# Patient Record
Sex: MALE | Race: WHITE | NOT HISPANIC OR LATINO | Employment: OTHER | URBAN - METROPOLITAN AREA
[De-identification: names, ages, dates, MRNs, and addresses within clinical notes are randomized per-mention and may not be internally consistent; named-entity substitution may affect disease eponyms.]

---

## 2017-01-04 ENCOUNTER — ALLSCRIPTS OFFICE VISIT (OUTPATIENT)
Dept: OTHER | Facility: OTHER | Age: 72
End: 2017-01-04

## 2017-01-04 DIAGNOSIS — M25.562 PAIN IN LEFT KNEE: ICD-10-CM

## 2017-01-04 DIAGNOSIS — R19.4 CHANGE IN BOWEL HABITS: ICD-10-CM

## 2017-01-04 DIAGNOSIS — R10.84 GENERALIZED ABDOMINAL PAIN: ICD-10-CM

## 2017-01-04 DIAGNOSIS — M19.90 OSTEOARTHRITIS: ICD-10-CM

## 2017-01-05 ENCOUNTER — APPOINTMENT (OUTPATIENT)
Dept: PHYSICAL THERAPY | Facility: CLINIC | Age: 72
End: 2017-01-05
Payer: COMMERCIAL

## 2017-01-05 PROCEDURE — 97161 PT EVAL LOW COMPLEX 20 MIN: CPT

## 2017-01-05 PROCEDURE — 97535 SELF CARE MNGMENT TRAINING: CPT

## 2017-01-05 PROCEDURE — 97140 MANUAL THERAPY 1/> REGIONS: CPT

## 2017-01-05 PROCEDURE — G8991 OTHER PT/OT GOAL STATUS: HCPCS

## 2017-01-05 PROCEDURE — G8990 OTHER PT/OT CURRENT STATUS: HCPCS

## 2017-01-09 ENCOUNTER — APPOINTMENT (OUTPATIENT)
Dept: PHYSICAL THERAPY | Facility: CLINIC | Age: 72
End: 2017-01-09
Payer: COMMERCIAL

## 2017-01-09 PROCEDURE — 97140 MANUAL THERAPY 1/> REGIONS: CPT

## 2017-01-09 PROCEDURE — 97110 THERAPEUTIC EXERCISES: CPT

## 2017-01-11 ENCOUNTER — APPOINTMENT (OUTPATIENT)
Dept: PHYSICAL THERAPY | Facility: CLINIC | Age: 72
End: 2017-01-11
Payer: COMMERCIAL

## 2017-01-11 PROCEDURE — 97110 THERAPEUTIC EXERCISES: CPT

## 2017-01-11 PROCEDURE — 97140 MANUAL THERAPY 1/> REGIONS: CPT

## 2017-01-12 ENCOUNTER — APPOINTMENT (OUTPATIENT)
Dept: PHYSICAL THERAPY | Facility: CLINIC | Age: 72
End: 2017-01-12
Payer: COMMERCIAL

## 2017-01-12 PROCEDURE — 97140 MANUAL THERAPY 1/> REGIONS: CPT

## 2017-01-12 PROCEDURE — 97110 THERAPEUTIC EXERCISES: CPT

## 2017-01-16 ENCOUNTER — APPOINTMENT (OUTPATIENT)
Dept: PHYSICAL THERAPY | Facility: CLINIC | Age: 72
End: 2017-01-16
Payer: COMMERCIAL

## 2017-01-16 PROCEDURE — 97110 THERAPEUTIC EXERCISES: CPT

## 2017-01-16 PROCEDURE — 97140 MANUAL THERAPY 1/> REGIONS: CPT

## 2017-01-18 ENCOUNTER — APPOINTMENT (OUTPATIENT)
Dept: PHYSICAL THERAPY | Facility: CLINIC | Age: 72
End: 2017-01-18
Payer: COMMERCIAL

## 2017-01-18 PROCEDURE — 97110 THERAPEUTIC EXERCISES: CPT

## 2017-01-18 PROCEDURE — 97140 MANUAL THERAPY 1/> REGIONS: CPT

## 2017-01-19 ENCOUNTER — APPOINTMENT (OUTPATIENT)
Dept: PHYSICAL THERAPY | Facility: CLINIC | Age: 72
End: 2017-01-19
Payer: COMMERCIAL

## 2017-01-19 PROCEDURE — 97140 MANUAL THERAPY 1/> REGIONS: CPT

## 2017-01-19 PROCEDURE — 97110 THERAPEUTIC EXERCISES: CPT

## 2017-01-23 ENCOUNTER — APPOINTMENT (OUTPATIENT)
Dept: PHYSICAL THERAPY | Facility: CLINIC | Age: 72
End: 2017-01-23
Payer: COMMERCIAL

## 2017-01-23 PROCEDURE — 97140 MANUAL THERAPY 1/> REGIONS: CPT

## 2017-01-23 PROCEDURE — 97110 THERAPEUTIC EXERCISES: CPT

## 2017-01-25 ENCOUNTER — APPOINTMENT (OUTPATIENT)
Dept: PHYSICAL THERAPY | Facility: CLINIC | Age: 72
End: 2017-01-25
Payer: COMMERCIAL

## 2017-01-25 PROCEDURE — 97110 THERAPEUTIC EXERCISES: CPT

## 2017-01-25 PROCEDURE — 97140 MANUAL THERAPY 1/> REGIONS: CPT

## 2017-01-26 ENCOUNTER — APPOINTMENT (OUTPATIENT)
Dept: PHYSICAL THERAPY | Facility: CLINIC | Age: 72
End: 2017-01-26
Payer: COMMERCIAL

## 2017-01-26 PROCEDURE — G8991 OTHER PT/OT GOAL STATUS: HCPCS

## 2017-01-26 PROCEDURE — 97110 THERAPEUTIC EXERCISES: CPT

## 2017-01-26 PROCEDURE — 97140 MANUAL THERAPY 1/> REGIONS: CPT

## 2017-01-26 PROCEDURE — G8990 OTHER PT/OT CURRENT STATUS: HCPCS

## 2017-01-26 PROCEDURE — 97164 PT RE-EVAL EST PLAN CARE: CPT

## 2017-01-27 ENCOUNTER — GENERIC CONVERSION - ENCOUNTER (OUTPATIENT)
Dept: OTHER | Facility: OTHER | Age: 72
End: 2017-01-27

## 2017-01-30 ENCOUNTER — APPOINTMENT (OUTPATIENT)
Dept: PHYSICAL THERAPY | Facility: CLINIC | Age: 72
End: 2017-01-30
Payer: COMMERCIAL

## 2017-01-30 PROCEDURE — 97140 MANUAL THERAPY 1/> REGIONS: CPT

## 2017-01-30 PROCEDURE — 97110 THERAPEUTIC EXERCISES: CPT

## 2017-02-01 ENCOUNTER — APPOINTMENT (OUTPATIENT)
Dept: PHYSICAL THERAPY | Facility: CLINIC | Age: 72
End: 2017-02-01
Payer: COMMERCIAL

## 2017-02-01 PROCEDURE — 97110 THERAPEUTIC EXERCISES: CPT

## 2017-02-01 PROCEDURE — 97140 MANUAL THERAPY 1/> REGIONS: CPT

## 2017-02-02 ENCOUNTER — APPOINTMENT (OUTPATIENT)
Dept: PHYSICAL THERAPY | Facility: CLINIC | Age: 72
End: 2017-02-02
Payer: COMMERCIAL

## 2017-02-02 PROCEDURE — 97140 MANUAL THERAPY 1/> REGIONS: CPT

## 2017-02-02 PROCEDURE — 97110 THERAPEUTIC EXERCISES: CPT

## 2017-02-06 ENCOUNTER — APPOINTMENT (OUTPATIENT)
Dept: PHYSICAL THERAPY | Facility: CLINIC | Age: 72
End: 2017-02-06
Payer: COMMERCIAL

## 2017-02-06 PROCEDURE — 97140 MANUAL THERAPY 1/> REGIONS: CPT

## 2017-02-06 PROCEDURE — 97110 THERAPEUTIC EXERCISES: CPT

## 2017-02-08 ENCOUNTER — APPOINTMENT (OUTPATIENT)
Dept: PHYSICAL THERAPY | Facility: CLINIC | Age: 72
End: 2017-02-08
Payer: COMMERCIAL

## 2017-02-08 PROCEDURE — 97140 MANUAL THERAPY 1/> REGIONS: CPT

## 2017-02-08 PROCEDURE — 97110 THERAPEUTIC EXERCISES: CPT

## 2017-02-09 ENCOUNTER — APPOINTMENT (OUTPATIENT)
Dept: PHYSICAL THERAPY | Facility: CLINIC | Age: 72
End: 2017-02-09
Payer: COMMERCIAL

## 2017-02-09 PROCEDURE — 97140 MANUAL THERAPY 1/> REGIONS: CPT

## 2017-02-09 PROCEDURE — 97010 HOT OR COLD PACKS THERAPY: CPT

## 2017-02-09 PROCEDURE — 97110 THERAPEUTIC EXERCISES: CPT

## 2017-02-13 ENCOUNTER — APPOINTMENT (OUTPATIENT)
Dept: PHYSICAL THERAPY | Facility: CLINIC | Age: 72
End: 2017-02-13
Payer: COMMERCIAL

## 2017-02-13 PROCEDURE — 97110 THERAPEUTIC EXERCISES: CPT

## 2017-02-13 PROCEDURE — 97140 MANUAL THERAPY 1/> REGIONS: CPT

## 2017-02-15 ENCOUNTER — APPOINTMENT (OUTPATIENT)
Dept: LAB | Facility: CLINIC | Age: 72
End: 2017-02-15
Payer: COMMERCIAL

## 2017-02-15 ENCOUNTER — APPOINTMENT (OUTPATIENT)
Dept: PHYSICAL THERAPY | Facility: CLINIC | Age: 72
End: 2017-02-15
Payer: COMMERCIAL

## 2017-02-15 ENCOUNTER — ALLSCRIPTS OFFICE VISIT (OUTPATIENT)
Dept: OTHER | Facility: OTHER | Age: 72
End: 2017-02-15

## 2017-02-15 ENCOUNTER — TRANSCRIBE ORDERS (OUTPATIENT)
Dept: LAB | Facility: CLINIC | Age: 72
End: 2017-02-15

## 2017-02-15 DIAGNOSIS — E11.9 DIABETES MELLITUS WITHOUT COMPLICATION (HCC): Primary | ICD-10-CM

## 2017-02-15 DIAGNOSIS — Z20.5 EXPOSURE TO HEPATITIS C: ICD-10-CM

## 2017-02-15 LAB
ALBUMIN SERPL BCP-MCNC: 3.7 G/DL (ref 3.5–5)
ALP SERPL-CCNC: 81 U/L (ref 46–116)
ALT SERPL W P-5'-P-CCNC: 21 U/L (ref 12–78)
ANION GAP SERPL CALCULATED.3IONS-SCNC: 10 MMOL/L (ref 4–13)
AST SERPL W P-5'-P-CCNC: 13 U/L (ref 5–45)
BILIRUB SERPL-MCNC: 1.14 MG/DL (ref 0.2–1)
BUN SERPL-MCNC: 14 MG/DL (ref 5–25)
CALCIUM SERPL-MCNC: 8.9 MG/DL (ref 8.3–10.1)
CHLORIDE SERPL-SCNC: 105 MMOL/L (ref 100–108)
CO2 SERPL-SCNC: 25 MMOL/L (ref 21–32)
CREAT SERPL-MCNC: 0.95 MG/DL (ref 0.6–1.3)
EST. AVERAGE GLUCOSE BLD GHB EST-MCNC: 140 MG/DL
GFR SERPL CREATININE-BSD FRML MDRD: >60 ML/MIN/1.73SQ M
GLUCOSE SERPL-MCNC: 130 MG/DL (ref 65–140)
HBA1C MFR BLD: 6.5 % (ref 4.2–6.3)
POTASSIUM SERPL-SCNC: 4 MMOL/L (ref 3.5–5.3)
PROT SERPL-MCNC: 7.1 G/DL (ref 6.4–8.2)
SODIUM SERPL-SCNC: 140 MMOL/L (ref 136–145)

## 2017-02-15 PROCEDURE — 36415 COLL VENOUS BLD VENIPUNCTURE: CPT

## 2017-02-15 PROCEDURE — 80053 COMPREHEN METABOLIC PANEL: CPT

## 2017-02-15 PROCEDURE — 97140 MANUAL THERAPY 1/> REGIONS: CPT

## 2017-02-15 PROCEDURE — 83036 HEMOGLOBIN GLYCOSYLATED A1C: CPT

## 2017-02-15 PROCEDURE — 97110 THERAPEUTIC EXERCISES: CPT

## 2017-02-15 PROCEDURE — 86803 HEPATITIS C AB TEST: CPT

## 2017-02-16 ENCOUNTER — APPOINTMENT (OUTPATIENT)
Dept: PHYSICAL THERAPY | Facility: CLINIC | Age: 72
End: 2017-02-16
Payer: COMMERCIAL

## 2017-02-16 ENCOUNTER — GENERIC CONVERSION - ENCOUNTER (OUTPATIENT)
Dept: OTHER | Facility: OTHER | Age: 72
End: 2017-02-16

## 2017-02-16 LAB — HCV AB SER QL: NORMAL

## 2017-02-16 PROCEDURE — 97110 THERAPEUTIC EXERCISES: CPT

## 2017-02-16 PROCEDURE — 97140 MANUAL THERAPY 1/> REGIONS: CPT

## 2017-02-17 ENCOUNTER — GENERIC CONVERSION - ENCOUNTER (OUTPATIENT)
Dept: OTHER | Facility: OTHER | Age: 72
End: 2017-02-17

## 2017-02-20 ENCOUNTER — APPOINTMENT (OUTPATIENT)
Dept: PHYSICAL THERAPY | Facility: CLINIC | Age: 72
End: 2017-02-20
Payer: COMMERCIAL

## 2017-02-20 PROCEDURE — 97110 THERAPEUTIC EXERCISES: CPT

## 2017-02-20 PROCEDURE — 97140 MANUAL THERAPY 1/> REGIONS: CPT

## 2017-02-22 ENCOUNTER — APPOINTMENT (OUTPATIENT)
Dept: PHYSICAL THERAPY | Facility: CLINIC | Age: 72
End: 2017-02-22
Payer: COMMERCIAL

## 2017-02-23 ENCOUNTER — APPOINTMENT (OUTPATIENT)
Dept: PHYSICAL THERAPY | Facility: CLINIC | Age: 72
End: 2017-02-23
Payer: COMMERCIAL

## 2017-02-23 PROCEDURE — 97140 MANUAL THERAPY 1/> REGIONS: CPT

## 2017-02-23 PROCEDURE — 97110 THERAPEUTIC EXERCISES: CPT

## 2017-05-30 ENCOUNTER — ALLSCRIPTS OFFICE VISIT (OUTPATIENT)
Dept: OTHER | Facility: OTHER | Age: 72
End: 2017-05-30

## 2017-05-30 DIAGNOSIS — E78.5 HYPERLIPIDEMIA: ICD-10-CM

## 2017-05-30 DIAGNOSIS — E11.9 TYPE 2 DIABETES MELLITUS WITHOUT COMPLICATIONS (HCC): ICD-10-CM

## 2017-08-28 ENCOUNTER — ALLSCRIPTS OFFICE VISIT (OUTPATIENT)
Dept: OTHER | Facility: OTHER | Age: 72
End: 2017-08-28

## 2017-08-28 DIAGNOSIS — E11.9 TYPE 2 DIABETES MELLITUS WITHOUT COMPLICATIONS (HCC): ICD-10-CM

## 2017-08-28 DIAGNOSIS — E78.5 HYPERLIPIDEMIA: ICD-10-CM

## 2017-08-29 ENCOUNTER — TRANSCRIBE ORDERS (OUTPATIENT)
Dept: ADMINISTRATIVE | Facility: HOSPITAL | Age: 72
End: 2017-08-29

## 2017-08-29 DIAGNOSIS — R14.0 ABDOMINAL BLOATING: Primary | ICD-10-CM

## 2017-09-11 ENCOUNTER — GENERIC CONVERSION - ENCOUNTER (OUTPATIENT)
Dept: OTHER | Facility: OTHER | Age: 72
End: 2017-09-11

## 2017-09-11 ENCOUNTER — HOSPITAL ENCOUNTER (OUTPATIENT)
Dept: NUCLEAR MEDICINE | Facility: HOSPITAL | Age: 72
Discharge: HOME | End: 2017-09-11
Attending: INTERNAL MEDICINE
Payer: COMMERCIAL

## 2017-09-11 DIAGNOSIS — R14.0 ABDOMINAL BLOATING: ICD-10-CM

## 2017-09-11 PROCEDURE — A9541 TC99M SULFUR COLLOID: HCPCS

## 2017-09-11 PROCEDURE — 78264 GASTRIC EMPTYING IMG STUDY: CPT

## 2017-09-14 ENCOUNTER — GENERIC CONVERSION - ENCOUNTER (OUTPATIENT)
Dept: OTHER | Facility: OTHER | Age: 72
End: 2017-09-14

## 2017-10-24 NOTE — PROGRESS NOTES
Assessment  1  Diabetes with neurologic complications (682 39) (O33 93)   2  DMII (diabetes mellitus, type 2) (250 00) (E11 9)   3  Hyperlipidemia (272 4) (E78 5)   · LDL up slightly given DM, but refusing and treatment  4  Hypertension (401 9) (I10)   5  Lymphedema (457 1) (I89 0)   6  Osteoarthritis (715 90) (M19 90)   7  Vitamin B12 deficiency (266 2) (E53 8)   8  Diabetic nephropathy (250 40,583 81) (E11 21)   9  Abdominal bloating (787 3) (R14 0)    Plan  Abdominal bloating    · NM GASTRIC EMPTYING; Status:Need Information - Financial Authorization; Requested  for:[id number];     Discussion/Summary  Discussion Summary:   Doing well except for the abdominal issues  ?GERD, gastroparesis, sprue, lactose intolerence, etc  Will check gastric emptying study  Declines PPI trial  Doubt pancreatitis  Continue current treatment  Labs up to date  RTC one week after the test    Medication SE Review and Pt Understands Tx: Possible side effects of new medications were reviewed with the patient/guardian today  The treatment plan was reviewed with the patient/guardian  The patient/guardian understands and agrees with the treatment plan      Chief Complaint  Chief Complaint Free Text Note Form: RTN - HTN, DM  Kalyan Villanueva Pt c/o nausea, abdominal bloating, discomfort, excessive flatus x 1 month   becoming more frequent  History of Present Illness  HPI: WM RTC for f/u dm, htn, etc  Doing well, notes several months of GI issues  Feels bloated and nauseated a lot  Increase belching and flatulence  No n/v  no changes in stool, and no pain  No heartburn  Just had labs done and HgA1c was 6 7  Hyperlipidemia (Follow-Up): The patient states his hyperlipidemia has been under good control since the last visit  Comorbid Illnesses: diabetes mellitus-- and-- hypertension  He has no significant interval events  Symptoms: The patient is currently asymptomatic  Associated symptoms include no focal neurologic deficits-- and-- no memory loss  Medications: The patient is not currently on any medications for his hyperlipidemia  The patient is doing well with his hyperlipidemia goals  Hypertension (Follow-Up): The patient presents for follow-up of essential hypertension  The patient states he has been doing well with his blood pressure control since the last visit  Comorbid Illnesses: nephropathy  He has no significant interval events  Symptoms: The patient is currently asymptomatic  Associated symptoms include no headache  Home monitoring: The patient checks his blood pressure sporadically  Blood pressure control has been good  Medications: the patient is adherent with his medication regimen  -- He denies medication side effects  Medication(s): an ACE inhibitor  Disease Management: the patient is doing well with his blood pressure goals  Diabetes Type II (Follow-Up): The patient states he has been doing well with his Type II Diabetes control since the last visit  Comorbid Illnesses: hypertension-- and-- hyperlipidemia  Complications: peripheral neuropathy-- and-- nephropathy, but-- no gastroparesis,-- no coronary artery disease-- and-- no sexual dysfunction  He has no significant interval events  Symptoms: stable numbness of the feet,-- denies a foot ulcer-- and-- denies foot pain  Home monitoring: The patient checks his blood sugars regularly  -- Glycemic control has been good  -- the patient reports no symptomatic hypoglycemic episodes  Medications: the patient is adherent with his medication regimen  -- He denies medication side effects  Medication(s): Metformin HCl,-- Sulfonylurea,-- ACE inhibitors-- and-- Aspirin  The patient is doing well with his diabetes goals  Osteoarthritis (Follow-Up): The patient states his osteoarthritis has been stable since the last visit  He has no comorbid illnesses  Osteoarthritis complications include knee arthroplasty  The patient's osteoarthritis has not resulted in physical disability   He has no significant interval events  Symptoms: improved knee pain  Associated symptoms include joint stiffness, but-- no localized joint swelling  Activities: no limitations  Medications: the patient is adherent with his medication regimen  -- He denies medication side effects  Medication(s): acetaminophen-- and-- a non-steroidal antiinflammatory agent  Disease Management: the patient is doing well with his osteoarthritis goals  Review of Systems  Complete-Male:   Constitutional: no fever,-- not feeling poorly,-- no chills-- and-- not feeling tired  Cardiovascular: no chest pain,-- no intermittent leg claudication,-- no palpitations-- and-- no extremity edema  Respiratory: no shortness of breath,-- no cough,-- no orthopnea,-- no wheezing,-- no shortness of breath during exertion-- and-- no PND  Gastrointestinal: abdominal pain, but-- as noted in HPI,-- no nausea,-- no vomiting,-- no constipation,-- no diarrhea-- and-- no blood in stools  Genitourinary: No complaints of dysuria, no incontinence, no hesitancy, no nocturia, no genital lesion, no testicular pain  Musculoskeletal: No complaints of arthralgia, no myalgias, no joint swelling or stiffness, no limb pain or swelling  Integumentary: No complaints of skin rash or skin lesions, no itching, no skin wound, no dry skin  Neurological: no headache,-- no confusion-- and-- no convulsions  Psychiatric: no anxiety-- and-- no depression  Endocrine: No complaints of proptosis, no hot flashes, no muscle weakness, no erectile dysfunction, no deepening of the voice, no feelings of weakness  Hematologic/Lymphatic: No complaints of swollen glands, no swollen glands in the neck, does not bleed easily, no easy bruising  Active Problems  1  Benign prostate hyperplasia (600 00) (N40 0)   2  Chronic allergic conjunctivitis (372 14) (H10 45)   3  Colon, diverticulosis (562 10) (K57 30)   4  Diabetes with neurologic complications (836 91) (L95 84)   5  Diabetic nephropathy (250 40,583 81) (E11 21)   6  DMII (diabetes mellitus, type 2) (250 00) (E11 9)   7  Encounter for diabetic foot exam (250 00) (E11 9)   8  Erectile dysfunction of non-organic origin (302 72) (F52 21)   9  Hyperlipidemia (272 4) (E78 5)   10  Hypertension (401 9) (I10)   11  Lymphedema (457 1) (I89 0)   12  Obesity (278 00) (E66 9)   13  Osteoarthritis (715 90) (M19 90)   14  Polyp of sigmoid colon (211 3) (D12 5)   15  Screening for neurological condition (V80 09) (Z13 89)   16  Screening for prostate cancer (V76 44) (Z12 5)   17  Sleep disturbances (780 50) (G47 9)   18  Vitamin B12 deficiency (266 2) (E53 8)   19  Wellness examination (V70 0) (Z00 00)    Past Medical History  1  History of Abdominal pain, acute, generalized (789 07,338 19) (R10 84)   2  History of Acute pain of left knee (719 46) (M25 562)   3  History of Acute serous otitis media of right ear (381 01) (H65 01)   4  History of Change in stool habits (787 99) (R19 4)   5  History of Eustachian tube dysfunction (381 81) (H69 80)   6  History of Exposure to hepatitis C (V01 79) (Z20 5)   7  History of fatigue (V13 89) (Z87 898)   8  History of Otitis externa (380 10) (H60 90)   9  History of Pre-operative examination for internal medicine (V72 83) (Z01 818)   10  History of Scrotal bleeding (608 83) (N50 1)  Active Problems And Past Medical History Reviewed: The active problems and past medical history were reviewed and updated today  Surgical History  1  History of Cholecystectomy   2  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   3  History of Total Knee Replacement Left   4  History of Total Knee Replacement Right   5  History of Umbilical Hernia Repair  Surgical History Reviewed: The surgical history was reviewed and updated today  Family History  Mother    1  No pertinent family history  Family History Reviewed: The family history was reviewed and updated today         Social History · Former smoker (U33 97) (X04 620)  Social History Reviewed: The social history was reviewed and updated today  The social history was reviewed and is unchanged  Current Meds   1  FreeStyle Test In Vitro Strip; #200 USE AS DIRECTED; Therapy: 21Apr2011 to  Requested for: 01Vfc7068 Recorded   2  Glimepiride 4 MG Oral Tablet; TAKE ONE TABLET BY MOUTH TWICE DAILY; Therapy: 07UTY1527 to [phone])  Requested for: 88FSK7592; Last Rx:01Feb2017;   Status: ACTIVE - Transmit to Pharmacy - Awaiting Verification Ordered   3  Lisinopril 10 MG Oral Tablet; TAKE ONE TABLET BY MOUTH EVERY DAY; Therapy: 76FFG4681 to (Mignon Mcmahon)  Requested for: 25MCY5960; Last Rx:34Tub4821   Ordered   4  MetFORMIN HCl - 1000 MG Oral Tablet; TAKE 1 TABLET Daily take 1 tab in am 1/2 tab at lunch 1 tab   in evening; Therapy: 81SIJ2505 to (Evaluate:27Jan2018)  Requested for: 60HSD4144; Last Rx:26Wqb6307   Ordered   5  Patanol 0 1 % Ophthalmic Solution; INSTILL 1 DROP INTO AFFECTED EYE(S) TWICE DAILY AS   DIRECTED; Therapy: 01TIF1082 to (Evaluate:08Jan2016)  Requested for: 23SIV2697; Last Rx:13Jan2015   Ordered   6  Tamsulosin HCl - 0 4 MG Oral Capsule; TAKE 1 CAPSULE Daily; Therapy: 13PBG5415 to (Evaluate:58Fqb3932)  Requested for: 04Cho6609; Last Rx:45Hze5337   Ordered  Medication List Reviewed: The medication list was reviewed and updated today  Allergies  1  Sulfa Drugs    Vitals  Vital Signs    Recorded: 98Jgj5776 02:27PM   Temperature 98 1 F   Heart Rate 72   Respiration 18   Systolic 949   Diastolic 68   Height 6 ft 4 in   Weight 256 lb    BMI Calculated 31 16   BSA Calculated 2 46     Physical Exam    Constitutional   General appearance: No acute distress, well appearing and well nourished  Eyes   Conjunctiva and lids: No swelling, erythema, or discharge  Pupils and irises: Equal, round and reactive to light      Ears, Nose, Mouth, and Throat   Oropharynx: Normal with no erythema, edema, exudate or lesions  Pulmonary   Respiratory effort: No increased work of breathing or signs of respiratory distress  Auscultation of lungs: Clear to auscultation, equal breath sounds bilaterally, no wheezes, no rales, no rhonci  Cardiovascular   Auscultation of heart: Normal rate and rhythm, normal S1 and S2, without murmurs  Examination of extremities for edema and/or varicosities: Normal     Carotid pulses: Normal     Abdomen   Abdomen: Non-tender, no masses  Liver and spleen: No hepatomegaly or splenomegaly  Musculoskeletal   Gait and station: Normal     Psychiatric   Orientation to person, place and time: Normal     Mood and affect: Normal          Health Management  Benign prostate hyperplasia   (1) PSA (SCREEN) (Dx V76 44 Screen for Prostate Cancer); every 1 year; Last 25Oct2016; Next  Due: 67ZXS6545; Near Due  Diabetes mellitus with polyneuropathy   *VB - Foot Exam; every 1 year; Last 48YMZ1673; Next Due: 91KSM5387; Active  Diabetes with neurologic complications   *VB - Foot Exam; every 1 year; Last 47TSW7371; Next Due: 53OAO8746; Active  Diabetic nephropathy   (1) HEMOGLOBIN A1C; every 6 months; Last 08KAE6730; Next Due: 85Xlx7781; Overdue  (1) MICROALBUMIN CREATININE RATIO, RANDOM URINE; every 1 year; Last 25Oct2016; Next Due:  45SNB3636; Near Due  DMII (diabetes mellitus, type 2)   (1) HEMOGLOBIN A1C; every 6 months; Last 34TYY7268; Next Due: 15Gfx1212; Overdue  (1) MICROALBUMIN CREATININE RATIO, RANDOM URINE; every 1 year; Last 25Oct2016; Next Due:  07XVF6428; Near Due  *VB - Foot Exam; every 1 year; Last 07MFP8865; Next Due: 09HBS7281; Active  History of Screening for diabetic retinopathy   *VB - Eye Exam; every 1 year; Next Due: 78HFB2418; Overdue  Health Maintenance   Medicare Annual Wellness Visit; every 1 year; Next Due: 59DEG9269;  Overdue    Signatures   Electronically signed by : CODIE Bennett ; Aug 28 2017  2:47PM EST                       (Author)

## 2017-10-30 ENCOUNTER — TRANSCRIBE ORDERS (OUTPATIENT)
Dept: LAB | Facility: CLINIC | Age: 72
End: 2017-10-30

## 2017-10-30 ENCOUNTER — APPOINTMENT (OUTPATIENT)
Dept: LAB | Facility: CLINIC | Age: 72
End: 2017-10-30
Payer: COMMERCIAL

## 2017-10-30 DIAGNOSIS — E87.1 HYPOSMOLALITY SYNDROME: Primary | ICD-10-CM

## 2017-10-30 DIAGNOSIS — E87.1 HYPOSMOLALITY SYNDROME: ICD-10-CM

## 2017-10-30 DIAGNOSIS — E11.9 DIABETES MELLITUS WITHOUT COMPLICATION (HCC): ICD-10-CM

## 2017-10-30 LAB
ANION GAP SERPL CALCULATED.3IONS-SCNC: 5 MMOL/L (ref 4–13)
BUN SERPL-MCNC: 14 MG/DL (ref 5–25)
CALCIUM SERPL-MCNC: 9 MG/DL (ref 8.3–10.1)
CHLORIDE SERPL-SCNC: 104 MMOL/L (ref 100–108)
CO2 SERPL-SCNC: 29 MMOL/L (ref 21–32)
CREAT SERPL-MCNC: 1 MG/DL (ref 0.6–1.3)
GFR SERPL CREATININE-BSD FRML MDRD: 76 ML/MIN/1.73SQ M
GLUCOSE P FAST SERPL-MCNC: 155 MG/DL (ref 65–99)
POTASSIUM SERPL-SCNC: 4.4 MMOL/L (ref 3.5–5.3)
SODIUM SERPL-SCNC: 138 MMOL/L (ref 136–145)

## 2017-10-30 PROCEDURE — 80048 BASIC METABOLIC PNL TOTAL CA: CPT

## 2017-10-30 PROCEDURE — 36415 COLL VENOUS BLD VENIPUNCTURE: CPT

## 2017-11-20 ENCOUNTER — TRANSCRIBE ORDERS (OUTPATIENT)
Dept: LAB | Facility: CLINIC | Age: 72
End: 2017-11-20

## 2017-11-20 ENCOUNTER — APPOINTMENT (OUTPATIENT)
Dept: LAB | Facility: CLINIC | Age: 72
End: 2017-11-20
Payer: COMMERCIAL

## 2017-11-20 DIAGNOSIS — E11.9 DIABETES MELLITUS WITHOUT COMPLICATION (HCC): Primary | ICD-10-CM

## 2017-11-20 DIAGNOSIS — E78.5 HYPERLIPIDEMIA, UNSPECIFIED HYPERLIPIDEMIA TYPE: ICD-10-CM

## 2017-11-20 DIAGNOSIS — E11.9 DIABETES MELLITUS WITHOUT COMPLICATION (HCC): ICD-10-CM

## 2017-11-20 LAB
ALBUMIN SERPL BCP-MCNC: 3.6 G/DL (ref 3.5–5)
ALP SERPL-CCNC: 87 U/L (ref 46–116)
ALT SERPL W P-5'-P-CCNC: 22 U/L (ref 12–78)
ANION GAP SERPL CALCULATED.3IONS-SCNC: 4 MMOL/L (ref 4–13)
AST SERPL W P-5'-P-CCNC: 8 U/L (ref 5–45)
BILIRUB SERPL-MCNC: 1.29 MG/DL (ref 0.2–1)
BUN SERPL-MCNC: 13 MG/DL (ref 5–25)
CALCIUM SERPL-MCNC: 8.8 MG/DL (ref 8.3–10.1)
CHLORIDE SERPL-SCNC: 105 MMOL/L (ref 100–108)
CO2 SERPL-SCNC: 30 MMOL/L (ref 21–32)
CREAT SERPL-MCNC: 0.93 MG/DL (ref 0.6–1.3)
EST. AVERAGE GLUCOSE BLD GHB EST-MCNC: 146 MG/DL
GFR SERPL CREATININE-BSD FRML MDRD: 83 ML/MIN/1.73SQ M
GLUCOSE P FAST SERPL-MCNC: 158 MG/DL (ref 65–99)
HBA1C MFR BLD: 6.7 % (ref 4.2–6.3)
LDLC SERPL DIRECT ASSAY-MCNC: 98 MG/DL (ref 0–100)
POTASSIUM SERPL-SCNC: 4.4 MMOL/L (ref 3.5–5.3)
PROT SERPL-MCNC: 7.1 G/DL (ref 6.4–8.2)
SODIUM SERPL-SCNC: 139 MMOL/L (ref 136–145)
TRIGL SERPL-MCNC: 127 MG/DL

## 2017-11-20 PROCEDURE — 83721 ASSAY OF BLOOD LIPOPROTEIN: CPT

## 2017-11-20 PROCEDURE — 80053 COMPREHEN METABOLIC PANEL: CPT

## 2017-11-20 PROCEDURE — 84478 ASSAY OF TRIGLYCERIDES: CPT

## 2017-11-20 PROCEDURE — 36415 COLL VENOUS BLD VENIPUNCTURE: CPT

## 2017-11-20 PROCEDURE — 83036 HEMOGLOBIN GLYCOSYLATED A1C: CPT

## 2017-11-21 ENCOUNTER — GENERIC CONVERSION - ENCOUNTER (OUTPATIENT)
Dept: OTHER | Facility: OTHER | Age: 72
End: 2017-11-21

## 2017-12-04 ENCOUNTER — APPOINTMENT (OUTPATIENT)
Dept: LAB | Facility: CLINIC | Age: 72
End: 2017-12-04
Payer: COMMERCIAL

## 2017-12-04 ENCOUNTER — TRANSCRIBE ORDERS (OUTPATIENT)
Dept: LAB | Facility: CLINIC | Age: 72
End: 2017-12-04

## 2017-12-04 ENCOUNTER — ALLSCRIPTS OFFICE VISIT (OUTPATIENT)
Dept: OTHER | Facility: OTHER | Age: 72
End: 2017-12-04

## 2017-12-04 DIAGNOSIS — Z12.5 ENCOUNTER FOR SCREENING FOR MALIGNANT NEOPLASM OF PROSTATE: ICD-10-CM

## 2017-12-04 DIAGNOSIS — R14.2 ERUCTATION: ICD-10-CM

## 2017-12-04 DIAGNOSIS — R14.0 ABDOMINAL DISTENSION (GASEOUS): ICD-10-CM

## 2017-12-04 DIAGNOSIS — R14.3 FLATULENCE: ICD-10-CM

## 2017-12-04 LAB — PSA SERPL-MCNC: 0.6 NG/ML (ref 0–4)

## 2017-12-04 PROCEDURE — 36415 COLL VENOUS BLD VENIPUNCTURE: CPT

## 2017-12-04 PROCEDURE — G0103 PSA SCREENING: HCPCS

## 2017-12-04 PROCEDURE — 82784 ASSAY IGA/IGD/IGG/IGM EACH: CPT

## 2017-12-04 PROCEDURE — 83516 IMMUNOASSAY NONANTIBODY: CPT

## 2017-12-04 PROCEDURE — 86255 FLUORESCENT ANTIBODY SCREEN: CPT

## 2017-12-05 ENCOUNTER — GENERIC CONVERSION - ENCOUNTER (OUTPATIENT)
Dept: OTHER | Facility: OTHER | Age: 72
End: 2017-12-05

## 2017-12-06 ENCOUNTER — GENERIC CONVERSION - ENCOUNTER (OUTPATIENT)
Dept: OTHER | Facility: OTHER | Age: 72
End: 2017-12-06

## 2017-12-06 LAB
ENDOMYSIUM IGA SER QL: NEGATIVE
GLIADIN PEPTIDE IGA SER-ACNC: 4 UNITS (ref 0–19)
GLIADIN PEPTIDE IGG SER-ACNC: 3 UNITS (ref 0–19)
IGA SERPL-MCNC: 256 MG/DL (ref 61–437)
TTG IGA SER-ACNC: <2 U/ML (ref 0–3)
TTG IGG SER-ACNC: <2 U/ML (ref 0–5)

## 2018-01-09 NOTE — PROGRESS NOTES
History of Present Illness  Care Coordination Encounter Information:   Type of Encounter: Telephonic   Contact: Initial Contact    Spoke to Spouse   Venita  Care Coordination SL Nurse [facility]:   The reason for call is to discuss outreach for follow up/needed services  Patient contacted for hospital follow up, on insurance discharge list as discharged to home 3/25/16 from acute rehab post bilateral knee replacements  Spoke with patient's wife, Deshaun Arzola  She reports patient is doing well, receiving home physical therapy for one more week and then transitioning to outpatient therapy starting 4/18/16  Will be receiving lymphedema therapy as an outpatient as well due to flare up of bilateral lower extremity edema following surgery  Had [facility] coming to home for assessments and incision checks, was discharged from their service 4/6/16  Reports patient followed up with Dr Baljinder Joe (orthopedic surgeon) yesterday, 4/7/16, and has another follow-up scheduled for 5/5/16  Reports patient is experiencing only moderate pain and has been using Tramadol prior to physical therapy, as patient had severe side effects from opioid pain medications  States Dr Baljinder Joe advised using Tylenol in addition to Tramadol to reduce muscle aches  Reports that since patient is now homebound, his blood sugars have stabilized tremendously because he is only eating home cooked meals  Also states patient is now checking blood sugars at least 3 times a day, with readings no higher than 150  I encouraged spouse that it sounds as if she is doing an excellent job caring for her  and that he is doing so well after major surgery because of her help  States patient has also been following with Dr Mark Suarez (nephrology) as his sodium levels were low in the hospital and remained low on discharge  Blood work just done this week, to have blood work done again in May before next follow up with Dr Mark Suarez        Spouse reports patient's main complaint currently is insomnia  Currently sleeping in recliner due to discomfort when lying flat  Task sent to Dr Emmy Jacome to make aware, will contact patient with update later today  Did advise spouse that they might try over the counter sleep aids before utilizing prescription strength medication  Reviewed current choices such as Unisom, Z-Quil, Benadryl, as they all have the same active ingredient  Spouse states she is unsure if patient has ever taken Benadryl and so is unsure if he has any adverse reaction to it  Stated she would not purchase any over the counter medications until an update is received from Dr Emmy Jacome regarding prescription  Spouse reports no further questions or concerns at conclusion of call  Update 4:09pm: Response not yet received from Dr Emmy Jacome regarding prescribing sleep aid  Left message on home phone  Advised use of over the counter sleep aid this weekend if needed and I will contact patient on Monday with update from the provider  Update: Provider sent script to pharmacy on file 4/8/16, patient made aware by practice on 4/11/16  Active Problems    1  Benign prostate hyperplasia (600 00) (N40 0)   2  Chronic allergic conjunctivitis (372 14) (H10 45)   3  Colon, diverticulosis (562 10) (K57 30)   4  Diabetes mellitus with polyneuropathy (250 60,357 2) (E11 42)   5  Diabetes with neurologic complications (726 39) (D99 71)   6  Diabetic nephropathy (250 40,583 81) (E11 21)   7  DMII (diabetes mellitus, type 2) (250 00) (E11 9)   8  Erectile dysfunction of non-organic origin (302 72) (F52 21)   9  Hyperlipidemia (272 4) (E78 5)   10  Hypertension (401 9) (I10)   11  Lymphedema (457 1) (I89 0)   12  Obesity (278 00) (E66 9)   13  Osteoarthritis (715 90) (M19 90)   14  Polyp of sigmoid colon (211 3) (D12 5)   15  Pre-operative examination for internal medicine (V72 83) (Z01 818)   16  Screening for diabetic retinopathy (V80 2) (Z13 5)   17   Screening for prostate cancer (V76 44) (Z12 5)   18  Vitamin B12 deficiency (266 2) (E53 8)    Past Medical History    1  History of Acute serous otitis media of right ear (381 01) (H65 01)   2  History of Eustachian tube dysfunction (381 81) (H69 80)   3  History of fatigue (V13 89) (Z87 898)   4  History of Otitis externa (380 10) (H60 90)   5  History of Scrotal bleeding (608 83) (N50 1)    Surgical History    1  History of Cholecystectomy   2  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   3  History of Umbilical Hernia Repair    Family History    1  No pertinent family history    Social History    · Former smoker (O99 04) (W85 885)    Current Meds    1  Tamsulosin HCl - 0 4 MG Oral Capsule; TAKE 1 CAPSULE Daily; Therapy: 97HOB0650 to (Evaluate:21Apr2016)  Requested for: 27Apr2015; Last   Rx:27Apr2015 Ordered    2  Patanol 0 1 % Ophthalmic Solution (Olopatadine HCl); INSTILL 1 DROP INTO   AFFECTED EYE(S) TWICE DAILY AS DIRECTED; Therapy: 51QDA3589 to (Evaluate:08Jan2016)  Requested for: 41TIM0163; Last   Rx:13Jan2015 Ordered    3  Glimepiride 4 MG Oral Tablet; TAKE ONE TABLET BY MOUTH TWICE DAILY; Therapy: 15JCQ6867 to (Evaluate:95Ihe7742)  Requested for: 37Ttk5036; Last   Rx:57Cuk5524 Ordered   4  MetFORMIN HCl - 1000 MG Oral Tablet; TAKE 1 TABLET Daily take 1 tab in am 1/2 tab at   lunch 1 tab in evening; Therapy: 98SMW1370 to (Geneva Robbins)  Requested for: 93Ekp0542; Last   Rx:23Pvk5218 Ordered    5  Lisinopril 10 MG Oral Tablet; TAKE ONE TABLET BY MOUTH EVERY DAY; Therapy: 24LJL1793 to (Carrington Arce)  Requested for: 07Apr2014; Last   Rx:07Apr2014 Ordered    6  FreeStyle Test In Vitro Strip; #200 USE AS DIRECTED; Therapy: 21Apr2011 to  Requested for: 23Vnx3420 Recorded    Allergies    1  Sulfa Drugs    Health Management   (1) PSA (SCREEN) (Dx V76 44 Screen for Prostate Cancer); every 1 year; Last 24KZO3221; Next  Due: 23YGL9409; Overdue   *VB-Foot Exam; every 1 year; Last 48AQG6527;  Next Due: 16DHL7632; Near Due   *VB-Foot Exam; every 1 year; Last 51ULX0973; Next Due: 46TNO5907; Near Due   (1) HEMOGLOBIN A1C; every 6 months; Last 51LTM2999; Next Due: 34Igs6286; Overdue  (1) MICROALBUMIN CREATININE RATIO, RANDOM URINE; every 1 year; Last 01BNO1212; Next Due:  75QSU7095; Overdue   (1) HEMOGLOBIN A1C; every 6 months; Last 60UHC7961; Next Due: 07Lys2170; Overdue  (1) MICROALBUMIN CREATININE RATIO, RANDOM URINE; every 1 year; Last 27GGJ9909; Next Due:  34JOS1972; Overdue  *VB-Foot Exam; every 1 year; Last 77HCG4853; Next Due: 20THJ8046; Near Due   *VB - Eye Exam; every 1 year; Last 96UPQ2457; Next Due: 89WZN3200; Overdue   Medicare Annual Wellness Visit; every 1 year; Next Due: 29ABE6714; Overdue    End of Encounter Meds    1  Tamsulosin HCl - 0 4 MG Oral Capsule; TAKE 1 CAPSULE Daily; Therapy: 83OGO4111 to (Evaluate:21Apr2016)  Requested for: 27Apr2015; Last   Rx:27Apr2015 Ordered    2  Patanol 0 1 % Ophthalmic Solution (Olopatadine HCl); INSTILL 1 DROP INTO   AFFECTED EYE(S) TWICE DAILY AS DIRECTED; Therapy: 32KJX2844 to (Evaluate:08Jan2016)  Requested for: 18IQX9548; Last   Rx:13Jan2015 Ordered    3  Glimepiride 4 MG Oral Tablet; TAKE ONE TABLET BY MOUTH TWICE DAILY; Therapy: 19AKN3770 to (Evaluate:53Axs6969)  Requested for: 93Sfd3109; Last   Rx:02Sep2015 Ordered   4  MetFORMIN HCl - 1000 MG Oral Tablet; TAKE 1 TABLET Daily take 1 tab in am 1/2 tab at   lunch 1 tab in evening; Therapy: 74RGD4244 to (Amanuel James)  Requested for: 08Sep2015; Last   Rx:97Mjs2023 Ordered    5  Lisinopril 10 MG Oral Tablet; TAKE ONE TABLET BY MOUTH EVERY DAY; Therapy: 84RBO5652 to (Issa Cobia)  Requested for: 13Omc0624; Last   Rx:07Apr2014 Ordered    6  FreeStyle Test In Vitro Strip; #200 USE AS DIRECTED;    Therapy: 21Apr2011 to  Requested for: 47Bnw1657 Recorded    Patient Care Team    Care Team Member Role Specialty Office Number   Rohit Patel MD  Anesthesia [phone]   Lou Banuelos MD Ophthalmology [phone]     Signatures   Electronically signed by : Suzanne Rangel RN; Apr 8 2016 10:22AM EST                       (Author)    Electronically signed by : Suzanne Rangel RN; Apr 8 2016  4:10PM EST                       (Author)    Electronically signed by : Suzanne Rangel RN; Apr 13 2016  9:24AM EST                       (Author)

## 2018-01-09 NOTE — RESULT NOTES
Verified Results  NM GASTRIC EMPTYING 80Vqk4442 07:33AM Shawnee Torres     Test Name Result Flag Reference   NM GASTRIC EMPTYING (Report)     GASTRIC EMPTYING STUDY     INDICATION: History of diabetes with abdominal bloating/distention, nausea      COMPARISON: None available     TECHNIQUE:  The study was performed following the oral administration of 1 0 mCi Tc-99m sulfur colloid combined with scrambled eggs, as part of a standard meal  Following the meal, one minute anterior and posterior images were obtained immediately and    at 0 25 hours, 0 5 hour, 1 hour, 1 5 hour intervals from the time of ingestion  Geometric mean analyses were then performed  As of March 1, 2016, this gastric emptying protocol has been modified and updated  The gastric emptying times and the normal    values reported below reflect the change in protocol  FINDINGS:     Gastric emptying at 0 5 hours = 33 (N < 30%)    Gastric emptying at 1 hour = 66 % (N = 10 - 70%)   Gastric emptying at 90 minutes = 100 % (N = > 40% at 2 hours)       Linear T-1/2 = 45 minutes         IMPRESSION:     There is no delay in gastric emptying of solids   Rather, gastric emptying of solids is somewhat rapid, with T-1/2 of 45 minutes       Workstation performed: [id number]     Signed by:   Juju Matson MD   9/11/17

## 2018-01-10 ENCOUNTER — GENERIC CONVERSION - ENCOUNTER (OUTPATIENT)
Dept: OTHER | Facility: OTHER | Age: 73
End: 2018-01-10

## 2018-01-12 VITALS
TEMPERATURE: 97.4 F | WEIGHT: 249 LBS | SYSTOLIC BLOOD PRESSURE: 110 MMHG | BODY MASS INDEX: 30.32 KG/M2 | RESPIRATION RATE: 18 BRPM | HEIGHT: 76 IN | DIASTOLIC BLOOD PRESSURE: 68 MMHG | HEART RATE: 78 BPM

## 2018-01-12 NOTE — PROGRESS NOTES
Assessment    1  Wellness examination (V70 0) (Z00 00)   2  DMII (diabetes mellitus, type 2) (250 00) (E11 9)   3  Hyperlipidemia (272 4) (E78 5)   · LDL up slightly given DM, but refusing and treatment  4  Hypertension (401 9) (I10)   5  Osteoarthritis (715 90) (M19 90)   6  Vitamin B12 deficiency (266 2) (E53 8)   7  Benign prostate hyperplasia (600 00) (N40 0)    Plan  DMII (diabetes mellitus, type 2)    · (1) HEMOGLOBIN A1C; Status:Active; Requested for:13Bhs4805;    · *VB - Foot Exam; Status:Temporary Deferral - Pt requests deferral;    · Eat a normal well-balanced diet ; Status:Complete;   Done: 54MPL5221   · Have your eyes examined by an eye doctor every year ; Status:Complete;   Done:  49Owt4751   · Aleksandr Rodrigo your feet daily ; Status:Complete;   Done: 73QYS3111   · Start eating more fiber ; Status:Complete;   Done: 62SDH5323   · Follow-up visit in 3 months Evaluation and Treatment  Follow-up  Status: Hold For -  Scheduling  Requested for: 24Aug2016  Hyperlipidemia    · (1) LIPID PANEL FASTING W DIRECT LDL REFLEX; Status:Active; Requested  for:83Dso3946;   Hypertension    · (1) CBC/PLT/DIFF; Status:Active; Requested for:65Wdb7500;    · (1) COMPREHENSIVE METABOLIC PANEL; Status:Active; Requested for:89Grz6996;    · (1) MICROALBUMIN CREATININE RATIO, RANDOM URINE; Status:Active; Requested  for:24Aug2016;    · A diet low in sodium and high in potassium, magnesium, and calcium can help your  blood pressure ; Status:Complete;   Done: 69BKK4212   · Begin a limited exercise program ; Status:Complete;   Done: 54QNV4522   · Begin or continue regular aerobic exercise  Gradually work up to at least count1  sessions of dur1 of exercise a week ; Status:Complete;   Done: 23AHL3070  Screening for genitourinary condition    · *VB - Urinary Incontinence Screen (Dx V81 6 Screen for UI);  Status:Complete;   Done:  59WIZ2895 12:00AM  Screening for neurological condition    · *VB - Fall Risk Assessment  (Dx V80 09 Screen for Neurologic Disorder);  Status:Complete;   Done: 15PPD1642 12:00AM  Screening for prostate cancer    · (1) PSA (SCREEN) (Dx V76 44 Screen for Prostate Cancer); Status:Active; Requested  for:63Qrw1194;   Vitamin B12 deficiency    · (1) FOLATE; Status:Active; Requested for:21Tbv4364;    · (1) VITAMIN B12; Status:Active; Requested for:34Zsy3262;     Discussion/Summary    Doing better  Check labs  Continue current treatment  Keep up with lymphedema specialist, DPM, and PT  Chief Complaint  Well Eval - DM, HTN, BPH  Madison Cunning Madison Cunning No complaints  History of Present Illness  HM, Adult Male: The patient is being seen for a health maintenance evaluation  The last health maintenance visit was 12 month(s) ago  Social History: Household members include spouse  He is   Work status: Retired  The patient is a former cigarette smoker  He reports rare alcohol use  He has never used illicit drugs  General Health: The patient's health since the last visit is described as fair  He has regular dental visits  He denies vision problems  He denies hearing loss  Immunizations status: up to date  Lifestyle:  He consumes a diverse and healthy diet  He does not have any weight concerns  He exercises regularly  He does not use tobacco  He consumes alcohol  He denies drug use  Screening:   HPI: WM RTC for wellness visit  Doing very well  S/p bilat TKR and going to PT and has lost weight and feeling good  Sugars less than 130  No c/o's  Due for labs  Due for foot exam, but has on compression stockings and wants to defer to next visit and also sees DPM    Preventive Quality Program 65 and Older: Falls Risk: The patient fell 0 times in the past 12 months  The patient currently has no urinary incontinence symptoms  Hyperlipidemia (Follow-Up): The patient states his hyperlipidemia has been under good control since the last visit  Comorbid Illnesses: diabetes mellitus and hypertension  He has no significant interval events     Symptoms: The patient is currently asymptomatic  Associated symptoms include no focal neurologic deficits and no memory loss  Medications: The patient is not currently on any medications for his hyperlipidemia  The patient is doing well with his hyperlipidemia goals  The patient is due for a lipid panel and liver function tests  Hypertension (Follow-Up): The patient presents for follow-up of essential hypertension  The patient states he has been doing well with his blood pressure control since the last visit  He has no comorbid illnesses  He has no significant interval events  Symptoms: The patient is currently asymptomatic  Associated symptoms include no headache and no focal neurologic deficits  Home monitoring: The patient checks his blood pressure sporadically  Blood pressure control has been good  Medications: the patient is adherent with his medication regimen  He denies medication side effects  Medication(s): an ACE inhibitor  Disease Management: the patient is doing well with his blood pressure goals  The patient is due for a lipid panel, a serum creatinine and a urine microalbumin  Diabetes Type II (Follow-Up): The patient states he has been doing well with his Type II Diabetes control since the last visit  Comorbid Illnesses: hypertension and hyperlipidemia  Complications: peripheral neuropathy, but no gastroparesis, no nephropathy, no coronary artery disease and no sexual dysfunction  He has no significant interval events  Symptoms: The patient is currently asymptomatic  Home monitoring: The patient checks his blood sugars regularly  Glycemic control has been good  the patient reports no symptomatic hypoglycemic episodes  Medications: the patient is adherent with his medication regimen  He denies medication side effects  Medication(s): Metformin HCl, Sulfonylurea, ACE inhibitors and Aspirin  The patient is doing well with his diabetes goals   Due For: a lipid panel, a urine microalbumin and a hemoglobin A1c  Osteoarthritis (Follow-Up): The patient states his osteoarthritis has been stable since the last visit  He has no comorbid illnesses  Osteoarthritis complications include knee arthroplasty  The patient's osteoarthritis has resulted in physical disability  He has no significant interval events  Symptoms: The patient is currently asymptomatic  Associated symptoms include joint stiffness, but no localized joint swelling  Medications: the patient is adherent with his medication regimen  He denies medication side effects  Medication(s): acetaminophen and a non-steroidal antiinflammatory agent  Disease Management: the patient is doing well with his osteoarthritis goals  Review of Systems    Constitutional: no fever, not feeling poorly, no chills and not feeling tired  Cardiovascular: no chest pain, no intermittent leg claudication, no palpitations and no extremity edema  Respiratory: no shortness of breath, no cough, no orthopnea, no wheezing, no shortness of breath during exertion and no PND  Gastrointestinal: no abdominal pain, no nausea, no constipation and no diarrhea  Genitourinary: no dysuria  Musculoskeletal: joint stiffness, but no joint swelling  Neurological: no headache, no confusion and no convulsions  Psychiatric: sleep disturbances, but no anxiety and no depression  Active Problems    1  Benign prostate hyperplasia (600 00) (N40 0)   2  Chronic allergic conjunctivitis (372 14) (H10 45)   3  Colon, diverticulosis (562 10) (K57 30)   4  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 28) (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)   5  Diabetic nephropathy (250 40,583 81) (E11 21)   6  DMII (diabetes mellitus, type 2) (250 00) (E11 9)   7  Erectile dysfunction of non-organic origin (302 72) (F52 21)   8  Hyperlipidemia (272 4) (E78 5)   9  Hypertension (401 9) (I10)   10  Lymphedema (457 1) (I89 0)   11  Obesity (278 00) (E66 9)   12  Osteoarthritis (715 90) (M19 90)   13   Polyp of sigmoid colon (211  3) (D12 5)   14  Screening for diabetic retinopathy (V80 2) (Z13 5)   15  Screening for prostate cancer (V76 44) (Z12 5)   16  Sleep disturbances (780 50) (G47 9)   17  Vitamin B12 deficiency (266 2) (E53 8)    Past Medical History    · History of Acute serous otitis media of right ear (381 01) (H65 01)   · History of Eustachian tube dysfunction (381 81) (H69 80)   · History of fatigue (V13 89) (Y69 097)   · History of Otitis externa (380 10) (H60 90)   · History of Scrotal bleeding (608 83) (N50 1)    Surgical History    · History of Cholecystectomy   · 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   · History of Total Knee Replacement Left   · History of Total Knee Replacement Right   · History of Umbilical Hernia Repair    Family History  Mother    · No pertinent family history    Social History    · Former smoker (I49 29) (M39 798)    Current Meds   1  FreeStyle Test In Vitro Strip; #200 USE AS DIRECTED; Therapy: 21Apr2011 to  Requested for: 86Vba7033 Recorded   2  Glimepiride 4 MG Oral Tablet; TAKE ONE TABLET BY MOUTH TWICE DAILY; Therapy: 48FBP3005 to (Evaluate:65Zha6614)  Requested for: 03Dam0199; Last   Rx:02Sep2015 Ordered   3  Lisinopril 10 MG Oral Tablet; TAKE ONE TABLET BY MOUTH EVERY DAY; Therapy: 91RST2985 to (Angie Chaidez)  Requested for: 77SDN3198; Last   Rx:69Zei4572 Ordered   4  MetFORMIN HCl - 1000 MG Oral Tablet; TAKE 1 TABLET Daily take 1 tab in am 1/2 tab at   lunch 1 tab in evening; Therapy: 87HIS8463 to (Donavon Barros)  Requested for: 80Xej0173; Last   Rx:16Uqh2768 Ordered   5  Patanol 0 1 % Ophthalmic Solution; INSTILL 1 DROP INTO AFFECTED EYE(S) TWICE   DAILY AS DIRECTED; Therapy: 95HMC5186 to (Evaluate:08Jan2016)  Requested for: 30NXD3388; Last   Rx:13Jan2015 Ordered   6  Tamsulosin HCl - 0 4 MG Oral Capsule; TAKE 1 CAPSULE Daily; Therapy: 24LBM9508 to (Evaluate:21Apr2016)  Requested for: 27Apr2015; Last   Rx:27Apr2015 Ordered   7   Zolpidem Tartrate 5 MG Oral Tablet; take 1 tablet by mouth at bedtime if needed for   sleep; Therapy: 08Apr2016 to (Evaluate:00Yua5309); Last Rx:08Apr2016 Ordered    Allergies    1  Sulfa Drugs    Vitals   Recorded: 01GTF9124 53:35RP   Systolic 080   Diastolic 60   Heart Rate 88   Respiration 18   Temperature 98 2 F   Height 6 ft 4 in   Weight 251 lb    BMI Calculated 30 55   BSA Calculated 2 44     Physical Exam    Constitutional   General appearance: No acute distress, well appearing and well nourished  Head and Face   Head and face: Normal     Palpation of the face and sinuses: No sinus tenderness  Eyes   Conjunctiva and lids: No erythema, swelling or discharge  Pupils and irises: Equal, round, reactive to light  Ears, Nose, Mouth, and Throat   Hearing: Normal     Nasal mucosa, septum, and turbinates: Normal without edema or erythema  Lips, teeth, and gums: Normal, good dentition  Oropharynx: Normal with no erythema, edema, exudate or lesions  Neck   Neck: Supple, symmetric, trachea midline, no masses  Thyroid: Normal, no thyromegaly  Pulmonary   Respiratory effort: No increased work of breathing or signs of respiratory distress  Auscultation of lungs: Clear to auscultation  Cardiovascular   Auscultation of heart: Normal rate and rhythm, normal S1 and S2, no murmurs  Carotid pulses: 2+ bilaterally  Peripheral vascular exam: Normal     Examination of extremities for edema and/or varicosities: Normal   none at this time  Compression stocking noted  Abdomen   Abdomen: Non-tender, no masses  Lymphatic   Palpation of lymph nodes in neck: No lymphadenopathy  Neurologic   Cranial nerves: Cranial nerves 2-12 intact  Cortical function: Normal mental status  Reflexes: 2+ and symmetric  Sensation: No sensory loss  Coordination: Normal finger to nose and heel to shin      Psychiatric   Judgment and insight: Normal     Orientation to person, place and time: Normal     Recent and remote memory: Intact  Mood and affect: Normal        Results/Data  *VB - Fall Risk Assessment  (Dx V80 09 Screen for Neurologic Disorder) 32Skj4367 12:00AM Omari Diaz     Test Name Result Flag Reference   Fall Risk Assessment 87QAG3329       (1) COMPREHENSIVE METABOLIC PANEL 88TPZ8800 93:69HA      Test Name Result Flag Reference   GLUCOSE,RANDM 166       Summary / No summary entered :      No summary entered  Documents attached :      [address] Work - Omari Penalozah; Enc: [id number] - Image Encounter - Omari Penalozah -      (Internal Medicine) (Additional Information Document)    Health Management  Benign prostate hyperplasia   (1) PSA (SCREEN) (Dx V76 44 Screen for Prostate Cancer); every 1 year; Last  36IAZ3736; Next Due: 54GNR3289; Overdue  Diabetes mellitus with polyneuropathy   *VB - Foot Exam; every 1 year; Last 68QXI7865; Next Due: 08VTQ3612; Overdue  Diabetes with neurologic complications   *VB - Foot Exam; every 1 year; Last 48QEA4166; Next Due: 01NVX8838; Overdue  Diabetic nephropathy   (1) HEMOGLOBIN A1C; every 6 months; Last 54TBC8266; Next Due: 53Hlj8166; Overdue  (1) MICROALBUMIN CREATININE RATIO, RANDOM URINE; every 1 year; Last  34BRU6043; Next Due: 08XSP7682; Overdue  DMII (diabetes mellitus, type 2)   (1) HEMOGLOBIN A1C; every 6 months; Last 68PMJ3519; Next Due: 38Jur3147; Overdue  (1) MICROALBUMIN CREATININE RATIO, RANDOM URINE; every 1 year; Last  21ZSL6649; Next Due: 99QED7038; Overdue  *VB - Foot Exam; every 1 year; Last 87ALB8372; Next Due: 75BNZ9292; Overdue  Screening for diabetic retinopathy   *VB - Eye Exam; every 1 year; Next Due: 53LGA8793; Overdue  Health Maintenance   Medicare Annual Wellness Visit; every 1 year; Next Due: 28KHU5179;  Overdue    Signatures   Electronically signed by : CODIE Tinajero ; Aug 24 2016 11:38AM EST                       (Author)

## 2018-01-13 VITALS
HEIGHT: 76 IN | HEART RATE: 82 BPM | DIASTOLIC BLOOD PRESSURE: 60 MMHG | SYSTOLIC BLOOD PRESSURE: 110 MMHG | WEIGHT: 253 LBS | BODY MASS INDEX: 30.81 KG/M2 | TEMPERATURE: 97 F | RESPIRATION RATE: 18 BRPM

## 2018-01-14 VITALS
WEIGHT: 263 LBS | HEIGHT: 76 IN | DIASTOLIC BLOOD PRESSURE: 70 MMHG | HEART RATE: 80 BPM | BODY MASS INDEX: 32.03 KG/M2 | TEMPERATURE: 98.3 F | SYSTOLIC BLOOD PRESSURE: 116 MMHG | RESPIRATION RATE: 20 BRPM

## 2018-01-14 VITALS
TEMPERATURE: 98.1 F | HEART RATE: 72 BPM | HEIGHT: 76 IN | WEIGHT: 256 LBS | SYSTOLIC BLOOD PRESSURE: 120 MMHG | BODY MASS INDEX: 31.17 KG/M2 | RESPIRATION RATE: 18 BRPM | DIASTOLIC BLOOD PRESSURE: 68 MMHG

## 2018-01-15 NOTE — RESULT NOTES
Verified Results  (1) LDL,DIRECT 02TBH2408 09:06AM Carolyn Conway     Test Name Result Flag Reference   LDL, DIRECT 98 mg/dl  0-100   This is a patient instruction: This is a fasting test  Water, black tea or black coffee only after 9:00pm the night before the test  Drink 2 glasses of water the morning of the test         LDL Cholesterol:        Optimal          <100 mg/dl        Near Optimal     100-129 mg/dl        Above Optimal          Borderline High   130-159 mg/dl          High              160-189 mg/dl          Very High        >189 mg/dl     (1) TRIGLYCERIDE 36MIO5968 09:06AM Carolyn Conway     Test Name Result Flag Reference   TRIGLYCERIDES 127 mg/dL  <=150   Specimen collection should occur prior to N-Acetylcysteine or Metamizole administration due to the potential for falsely depressed results  Triglyceride:        Normal <150 mg/dl   Borderline High 150-199 mg/dl   High 200-499 mg/dl   Very High >499 mg/dl     (1) COMPREHENSIVE METABOLIC PANEL 77FGU9623 44:90ER Carolyn Conway     Test Name Result Flag Reference   SODIUM 139 mmol/L  136-145   POTASSIUM 4 4 mmol/L  3 5-5 3   CHLORIDE 105 mmol/L  100-108   CARBON DIOXIDE 30 mmol/L  21-32   ANION GAP (CALC) 4 mmol/L  4-13   BLOOD UREA NITROGEN 13 mg/dL  5-25   CREATININE 0 93 mg/dL  0 60-1 30   Standardized to IDMS reference method   CALCIUM 8 8 mg/dL  8 3-10 1   BILI, TOTAL 1 29 mg/dL H 0 20-1 00   ALK PHOSPHATAS 87 U/L     ALT (SGPT) 22 U/L  12-78   Specimen collection should occur prior to Sulfasalazine and/or Sulfapyridine administration due to the potential for falsely depressed results  AST(SGOT) 8 U/L  5-45   Specimen collection should occur prior to Sulfasalazine administration due to the potential for falsely depressed results     ALBUMIN 3 6 g/dL  3 5-5 0   TOTAL PROTEIN 7 1 g/dL  6 4-8 2   eGFR 83 ml/min/1 73sq m     National Kidney Disease Education Program recommendations are as follows:  GFR calculation is accurate only with a steady state creatinine  Chronic Kidney disease less than 60 ml/min/1 73 sq  meters  Kidney failure less than 15 ml/min/1 73 sq  meters  GLUCOSE FASTING 158 mg/dL H 65-99   Specimen collection should occur prior to Sulfasalazine administration due to the potential for falsely depressed results  Specimen collection should occur prior to Sulfapyridine administration due to the potential for falsely elevated results  (1) HEMOGLOBIN A1C 31EDT8895 09:06AM Ana Maria Sibley     Test Name Result Flag Reference   HEMOGLOBIN A1C 6 7 % H 4 2-6 3   EST  AVG  GLUCOSE 146 mg/dl         Plan  Diabetic nephropathy, DMII (diabetes mellitus, type 2)    · (1) HEMOGLOBIN A1C ; every 6 months;  Last 39OLP2485; Status:Active

## 2018-01-17 ENCOUNTER — TRANSCRIBE ORDERS (OUTPATIENT)
Dept: LAB | Facility: CLINIC | Age: 73
End: 2018-01-17

## 2018-01-17 ENCOUNTER — LAB (OUTPATIENT)
Dept: LAB | Facility: CLINIC | Age: 73
End: 2018-01-17
Payer: COMMERCIAL

## 2018-01-17 DIAGNOSIS — R14.0 ABDOMINAL DISTENSION (GASEOUS): ICD-10-CM

## 2018-01-17 DIAGNOSIS — R11.0 NAUSEA: ICD-10-CM

## 2018-01-17 DIAGNOSIS — R14.2 ERUCTATION: ICD-10-CM

## 2018-01-17 DIAGNOSIS — R14.0 ABDOMINAL DISTENSION (GASEOUS): Primary | ICD-10-CM

## 2018-01-17 DIAGNOSIS — R14.3 FLATULENCE: ICD-10-CM

## 2018-01-17 PROCEDURE — 87338 HPYLORI STOOL AG IA: CPT

## 2018-01-17 NOTE — RESULT NOTES
Verified Results  NM GASTRIC EMPTYING 58Mxm6007 07:33AM Radha Covert     Test Name Result Flag Reference   NM GASTRIC EMPTYING (Report)     GASTRIC EMPTYING STUDY     INDICATION: History of diabetes with abdominal bloating/distention, nausea      COMPARISON: None available     TECHNIQUE:  The study was performed following the oral administration of 1 0 mCi Tc-99m sulfur colloid combined with scrambled eggs, as part of a standard meal  Following the meal, one minute anterior and posterior images were obtained immediately and    at 0 25 hours, 0 5 hour, 1 hour, 1 5 hour intervals from the time of ingestion  Geometric mean analyses were then performed  As of March 1, 2016, this gastric emptying protocol has been modified and updated  The gastric emptying times and the normal    values reported below reflect the change in protocol  FINDINGS:     Gastric emptying at 0 5 hours = 33 (N < 30%)    Gastric emptying at 1 hour = 66 % (N = 10 - 70%)   Gastric emptying at 90 minutes = 100 % (N = > 40% at 2 hours)       Linear T-1/2 = 45 minutes         IMPRESSION:     There is no delay in gastric emptying of solids   Rather, gastric emptying of solids is somewhat rapid, with T-1/2 of 45 minutes       Workstation performed: [id number]     Signed by:   Vicente Barrow MD   9/11/17

## 2018-01-17 NOTE — RESULT NOTES
Verified Results  (1) LIPID PANEL FASTING W DIRECT LDL REFLEX 25Oct2016 10:10AM Marce Brooks Order Number: [id number]     Test Name Result Flag Reference   CHOLESTEROL 135 mg/dL     LDL CHOLESTEROL CALCULATED 71 mg/dL  0-100   - Patient Instructions: This is a fasting blood test  Water, black tea or black coffee only after 9:00pm the night before test   Drink 2 glasses of water the morning of test     - Patient Instructions: This is a fasting blood test  Water, black tea or black coffee only after 9:00pm the night before test Drink 2 glasses of water the morning of test   Triglyceride:         Normal              <150 mg/dl       Borderline High    150-199 mg/dl       High               200-499 mg/dl       Very High          >499 mg/dl  Cholesterol:         Desirable        <200 mg/dl      Borderline High  200-239 mg/dl      High             >239 mg/dl  HDL Cholesterol:        High    >59 mg/dL      Low     <41 mg/dL  LDL Cholesterol:        Optimal          <100 mg/dl        Near Optimal     100-129 mg/dl        Above Optimal          Borderline High   130-159 mg/dl          High              160-189 mg/dl          Very High        >189 mg/dl  LDL CALCULATED:    This screening LDL is a calculated result  It does not have the accuracy of the Direct Measured LDL in the monitoring of patients with hyperlipidemia and/or statin therapy  Direct Measure LDL (LDJ314) must be ordered separately in these patients  TRIGLYCERIDES 105 mg/dL  <=150   Specimen collection should occur prior to N-Acetylcysteine or Metamizole administration due to the potential for falsely depressed results  HDL,DIRECT 43 mg/dL  40-60   Specimen collection should occur prior to Metamizole administration due to the potential for falsely depressed results       (1) CBC/PLT/DIFF 25Oct2016 10:10AM Froylan Kim     Test Name Result Flag Reference   WBC COUNT 8 04 Thousand/uL  4 31-10 16   RBC COUNT 4 84 Million/uL 3  88-5 62   HEMOGLOBIN 15 4 g/dL  12 0-17 0   HEMATOCRIT 43 9 %  36 5-49 3   MCV 91 fL  82-98   MCH 31 8 pg  26 8-34 3   MCHC 35 1 g/dL  31 4-37 4   RDW 14 1 %  11 6-15 1   MPV 9 9 fL  8 9-12 7   PLATELET COUNT 170 Thousands/uL  149-390   nRBC AUTOMATED 0 /100 WBCs     NEUTROPHILS RELATIVE PERCENT 70 %  43-75   LYMPHOCYTES RELATIVE PERCENT 21 %  14-44   MONOCYTES RELATIVE PERCENT 6 %  4-12   EOSINOPHILS RELATIVE PERCENT 3 %  0-6   BASOPHILS RELATIVE PERCENT 0 %  0-1   NEUTROPHILS ABSOLUTE COUNT 5 60 Thousands/?L  1 85-7 62   LYMPHOCYTES ABSOLUTE COUNT 1 70 Thousands/?L  0 60-4 47   MONOCYTES ABSOLUTE COUNT 0 45 Thousand/?L  0 17-1 22   EOSINOPHILS ABSOLUTE COUNT 0 25 Thousand/?L  0 00-0 61   BASOPHILS ABSOLUTE COUNT 0 03 Thousands/?L  0 00-0 10     (1) PSA (SCREEN) (Dx V76 44 Screen for Prostate Cancer) 25Oct2016 10:10AM [employer]     Test Name Result Flag Reference   PROSTATE SPECIFIC ANTIGEN 0 6 ng/mL  0 0-4 0   American Urological Association Guidelines define biochemical recurrence of prostate cancer as a detectable or rising PSA value post-radical prostatectomy that is greater than or equal to 0 2 ng/mL with a second confirmatory level of greater than or equal to 0 2 ng/mL  (1) MICROALBUMIN CREATININE RATIO, RANDOM URINE 25Oct2016 10:10AM [employer]     Test Name Result Flag Reference   MICROALBUMIN/ CREAT R 5 mg/g creatinine  0-30   MICROALBUMIN,URINE 5 8 mg/L  0 0-20 0   CREATININE URINE 127 0 mg/dL       (1) VITAMIN B12 25Oct2016 10:10AM [employer]     Test Name Result Flag Reference   VITAMIN B12 173 pg/mL  100-900   - Patient Instructions: This is a fasting blood test  Water, black tea or black coffee only after 9:00pm the night before test Drink 2 glasses of water the morning of test      (1) FOLATE 25Oct2016 10:10AM [employer]     Test Name Result Flag Reference   FOLATE >20 0 ng/mL H 3 1-17 5   - Patient Instructions:  This is a fasting blood test  Water, black tea or black coffee only after 9:00pm the night before test Drink 2 glasses of water the morning of test      (1) HEMOGLOBIN A1C 25Oct2016 10:10AM [facility]     Test Name Result Flag Reference   HEMOGLOBIN A1C 6 7 % H 4 2-6 3   EST  AVG  GLUCOSE 146 mg/dl         Plan  Benign prostate hyperplasia    · (1) PSA (SCREEN) (Dx V76 44 Screen for Prostate Cancer) ; every 1 year; Last  25Oct2016; Status:Active  Diabetic nephropathy, DMII (diabetes mellitus, type 2)    · (1) HEMOGLOBIN A1C ; every 6 months; Last 25Oct2016; Status:Active   · (1) MICROALBUMIN CREATININE RATIO, RANDOM URINE ; every 1 year;  Last  23QHS6335; Status:Active

## 2018-01-17 NOTE — RESULT NOTES
Verified Results  (1) HEMOGLOBIN A1C [id number] 12:16PM Will Brown     Test Name Result Flag Reference   HEMOGLOBIN A1C 6 5 % H 4 2-6 3   EST  AVG  GLUCOSE 140 mg/dl       (1) COMPREHENSIVE METABOLIC PANEL 55OFB4984 28:94KI Will Brown     Test Name Result Flag Reference   GLUCOSE,RANDM 130 mg/dL     If the patient is fasting, the ADA then defines impaired fasting glucose as > 100 mg/dL and diabetes as > or equal to 123 mg/dL  SODIUM 140 mmol/L  136-145   POTASSIUM 4 0 mmol/L  3 5-5 3   CHLORIDE 105 mmol/L  100-108   CARBON DIOXIDE 25 mmol/L  21-32   ANION GAP (CALC) 10 mmol/L  4-13   BLOOD UREA NITROGEN 14 mg/dL  5-25   CREATININE 0 95 mg/dL  0 60-1 30   Standardized to IDMS reference method   CALCIUM 8 9 mg/dL  8 3-10 1   BILI, TOTAL 1 14 mg/dL H 0 20-1 00   ALK PHOSPHATAS 81 U/L     ALT (SGPT) 21 U/L  12-78   AST(SGOT) 13 U/L  5-45   ALBUMIN 3 7 g/dL  3 5-5 0   TOTAL PROTEIN 7 1 g/dL  6 4-8 2   eGFR Non-African American      >60 0 ml/min/1 73sq m   [facility] Disease Education Program recommendations are as follows:  GFR calculation is accurate only with a steady state creatinine  Chronic Kidney disease less than 60 ml/min/1 73 sq  meters  Kidney failure less than 15 ml/min/1 73 sq  meters  Plan  Diabetic nephropathy, DMII (diabetes mellitus, type 2)    · (1) HEMOGLOBIN A1C ; every 6 months;  Last 49SKT6706; Status:Active

## 2018-01-18 LAB — H PYLORI AG STL QL IA: NEGATIVE

## 2018-01-19 ENCOUNTER — GENERIC CONVERSION - ENCOUNTER (OUTPATIENT)
Dept: OTHER | Facility: OTHER | Age: 73
End: 2018-01-19

## 2018-01-23 VITALS
SYSTOLIC BLOOD PRESSURE: 112 MMHG | TEMPERATURE: 98 F | BODY MASS INDEX: 32.39 KG/M2 | HEART RATE: 76 BPM | RESPIRATION RATE: 18 BRPM | DIASTOLIC BLOOD PRESSURE: 64 MMHG | WEIGHT: 266 LBS | HEIGHT: 76 IN

## 2018-01-23 NOTE — RESULT NOTES
Verified Results  (1) [address] [id number] 11:19AM Yvette Bird     Test Name Result Flag Reference   H PYLORI ANTIGEN Negative  Negative   Performed at:  [address]  [id number]  : Edwige Goldstein MD, Phone:  [phone]

## 2018-01-23 NOTE — RESULT NOTES
Verified Results  (1) CELIAC DISEASE AB PROFILE [id number] 02:26PM [facility] [place] Order Number: [id number]     Test Name Result Flag Reference   tTG IGG <2 U/mL  0 - 5   Negative        0 - 5                                Weak Positive   6 - 9                                Positive           >9   tTG IGA <2 U/mL  0 - 3   Negative        0 -  3                                Weak Positive   4 - 10                                Positive           >10   Tissue Transglutaminase (tTG) has been identified   as the endomysial antigen  Studies have demonstr-   ated that endomysial IgA antibodies have over 99%   specificity for gluten sensitive enteropathy     GLIADA 4 units  0 - 19   Negative                   0 - 19                     Weak Positive             20 - 30                     Moderate to Strong Positive   >30   GLIADG 3 units  0 - 19   Negative                   0 - 19                     Weak Positive             20 - 30                     Moderate to Strong Positive   >30   ENDOMYSIAL AB IGA Negative  Negative   Performed at:  [facility] [place] [address]  [id number]  : Jammie Mitchell MD, Phone:  [phone]    mg/dL  75 - 059

## 2018-01-23 NOTE — RESULT NOTES
Verified Results  (1) PSA (SCREEN) (Dx V76 44 Screen for Prostate Cancer) [id number] 02:26PM [facility] Order Number: [id number]     Test Name Result Flag Reference   PROSTATE SPECIFIC ANTIGEN 0 6 ng/mL  0 0-4 0   American Urological Association Guidelines define biochemical recurrence of prostate cancer as a detectable or rising PSA value post-radical prostatectomy that is greater than or equal to 0 2 ng/mL with a second confirmatory level of greater than or equal to 0 2 ng/mL  Plan  Benign prostate hyperplasia    · (1) PSA (SCREEN) (Dx V76 44 Screen for Prostate Cancer) ; every 1 year;  Last  [id number]; Status:Active

## 2018-01-24 VITALS
HEART RATE: 84 BPM | SYSTOLIC BLOOD PRESSURE: 122 MMHG | RESPIRATION RATE: 18 BRPM | WEIGHT: 264.25 LBS | HEIGHT: 76 IN | BODY MASS INDEX: 32.18 KG/M2 | DIASTOLIC BLOOD PRESSURE: 64 MMHG | TEMPERATURE: 97.6 F | OXYGEN SATURATION: 97 %

## 2018-04-06 RX ORDER — OLOPATADINE HYDROCHLORIDE 1 MG/ML
1 SOLUTION/ DROPS OPHTHALMIC 2 TIMES DAILY
COMMUNITY
Start: 2012-02-06 | End: 2019-07-15 | Stop reason: SDUPTHER

## 2018-04-06 RX ORDER — GLIMEPIRIDE 4 MG/1
1 TABLET ORAL 2 TIMES DAILY
COMMUNITY
Start: 2011-03-09 | End: 2018-04-11 | Stop reason: SDUPTHER

## 2018-04-06 RX ORDER — TAMSULOSIN HYDROCHLORIDE 0.4 MG/1
1 CAPSULE ORAL DAILY
COMMUNITY
Start: 2011-03-12 | End: 2018-08-31 | Stop reason: SDUPTHER

## 2018-04-06 RX ORDER — LISINOPRIL 10 MG/1
1 TABLET ORAL DAILY
COMMUNITY
Start: 2012-02-15 | End: 2019-03-25 | Stop reason: SDUPTHER

## 2018-04-06 RX ORDER — FINASTERIDE 5 MG/1
1 TABLET, FILM COATED ORAL DAILY
COMMUNITY
Start: 2017-12-04 | End: 2019-01-03 | Stop reason: SDUPTHER

## 2018-04-09 PROBLEM — E11.9 ENCOUNTER FOR DIABETIC FOOT EXAM (HCC): Status: ACTIVE | Noted: 2017-05-30

## 2018-04-11 ENCOUNTER — OFFICE VISIT (OUTPATIENT)
Dept: INTERNAL MEDICINE CLINIC | Facility: CLINIC | Age: 73
End: 2018-04-11
Payer: COMMERCIAL

## 2018-04-11 VITALS
BODY MASS INDEX: 33.36 KG/M2 | HEIGHT: 76 IN | HEART RATE: 81 BPM | SYSTOLIC BLOOD PRESSURE: 120 MMHG | OXYGEN SATURATION: 95 % | TEMPERATURE: 98.7 F | DIASTOLIC BLOOD PRESSURE: 70 MMHG | RESPIRATION RATE: 18 BRPM | WEIGHT: 274 LBS

## 2018-04-11 DIAGNOSIS — N40.0 BENIGN PROSTATIC HYPERPLASIA WITHOUT LOWER URINARY TRACT SYMPTOMS: ICD-10-CM

## 2018-04-11 DIAGNOSIS — E11.21 DIABETIC NEPHROPATHY ASSOCIATED WITH TYPE 2 DIABETES MELLITUS (HCC): ICD-10-CM

## 2018-04-11 DIAGNOSIS — E11.41 TYPE 2 DIABETES MELLITUS WITH DIABETIC MONONEUROPATHY, WITHOUT LONG-TERM CURRENT USE OF INSULIN (HCC): Primary | ICD-10-CM

## 2018-04-11 DIAGNOSIS — M15.9 PRIMARY OSTEOARTHRITIS INVOLVING MULTIPLE JOINTS: ICD-10-CM

## 2018-04-11 DIAGNOSIS — I10 ESSENTIAL HYPERTENSION: ICD-10-CM

## 2018-04-11 DIAGNOSIS — E78.2 MIXED HYPERLIPIDEMIA: ICD-10-CM

## 2018-04-11 DIAGNOSIS — I89.0 LYMPHEDEMA: ICD-10-CM

## 2018-04-11 PROBLEM — K21.9 GASTROESOPHAGEAL REFLUX DISEASE WITHOUT ESOPHAGITIS: Status: ACTIVE | Noted: 2018-04-11

## 2018-04-11 PROCEDURE — 1101F PT FALLS ASSESS-DOCD LE1/YR: CPT | Performed by: INTERNAL MEDICINE

## 2018-04-11 PROCEDURE — 99214 OFFICE O/P EST MOD 30 MIN: CPT | Performed by: INTERNAL MEDICINE

## 2018-04-11 PROCEDURE — 3066F NEPHROPATHY DOC TX: CPT | Performed by: INTERNAL MEDICINE

## 2018-04-11 RX ORDER — FAMOTIDINE 40 MG/1
40 TABLET, FILM COATED ORAL 2 TIMES DAILY
COMMUNITY
End: 2018-10-12

## 2018-04-11 RX ORDER — GLIMEPIRIDE 4 MG/1
4 TABLET ORAL 2 TIMES DAILY
Qty: 180 TABLET | Refills: 3 | Status: SHIPPED | OUTPATIENT
Start: 2018-04-11 | End: 2018-11-27 | Stop reason: SDUPTHER

## 2018-04-11 RX ORDER — ASPIRIN 81 MG/1
81 TABLET, CHEWABLE ORAL DAILY
COMMUNITY
End: 2019-10-17

## 2018-04-11 NOTE — PATIENT INSTRUCTIONS
Lymphedema   AMBULATORY CARE:   The lymphatic system  contains fluid, vessels, tissue, and organs  This system removes and carries fluid throughout the body  It also helps the body fight infection  Lymphedema is the buildup of lymph fluid in fat tissue under your skin  The buildup causes the area to swell  Lymphedema can happen any time that lymphatic vessels are blocked or damaged  Damage to lymphatic vessels may be caused by surgery, infection, injury, cancer, radiation, or scar tissue     Common signs and symptoms include the following:  Signs and symptoms may happen where lymph nodes were removed, or in the arm, leg, chest, or underarm  · Swelling or itching    · Pain, burning, or aching    · Tight, hard, or red skin    · Hair loss    · Heaviness or fullness    · Numbness or tingling    · Stiffness  Contact your healthcare provider or lymphedema specialist if:   · You have a fever or chills  · You have an open area of skin that looks red or swollen, or drains pus  · Your symptoms, such as swelling or pain, get worse  · Your arms or legs feel heavy, or you cannot move them  · Your skin becomes hard, thick, or rough  · You have a skin wound that will not heal      · Your shoes, clothes, or jewelry feel tighter  · You have questions or concerns about your condition or care  Treatment for lymphedema  can relieve symptoms and prevent lymphedema from getting worse  You may need therapeutic massage, physical therapy, or compression devices to help decrease swelling and pain  Surgery may be needed if other treatments do not work  Self-care:   · Elevate  your arm or leg above the level of your heart as often as you can  This will help decrease swelling and pain  Prop your arm or leg on pillows or blankets to keep it elevated comfortably  · Wear compression socks, sleeves, or bandages  as directed  Compression devices must be fitted by a healthcare provider   Compression devices may need to be replaced every 3 to 6 months  · Exercise  can help you maintain or regain function of your arm or leg  Ask your healthcare provider what type of exercise to do and how often to do it  Start slow, take breaks, and gradually do more each day  Do not do vigorous, repeated exercises  Watch for changes in your arm or leg during exercise  Stop and rest if you have swelling, increased pain, or heaviness  Elevate your arm or leg above the level of your heart  · Change your position often  to help move lymphatic fluid through your body  Do not sit or  one position for more than 30 minutes  Do not cross your legs when you sit  These actions can cause lymphatic fluid to buildup  · Maintain a healthy weight  Ask your healthcare provider what you should weigh  Weight loss may improve your symptoms  If you need to lose weight, your healthcare provider can help you create a weight loss program   Prevent infection with proper skin care:  A skin infection can make lymphedema worse  Do the following to decrease your risk for a skin infection in your arm or leg with lymphedema:  · Wash your skin gently and dry it well  Use a mild soap to wash your skin  Gently pat your skin dry after you bathe  Apply a mild cream or lotion to moisturize your skin and prevent dryness or cracking  Keep your feet clean and dry  · Protect your skin from injury  Wear gloves when you garden and wash dishes  Cut your nails straight across to prevent injury to your fingers and toes  Use sunscreen and insect repellant to avoid burns and punctures  Wear slippers in the house  Wear shoes when you go outside  · Check your skin every day for signs of infection  Signs of infection include redness, swelling, increased heat, or pus  You may also have a fever or chills  · Care for cuts, scratches or burns  Apply antibiotic ointment to cuts and other small breaks in the skin   Apply a cold pack or cold water to a burn for 15 minutes  Then wash it with soap and water  Cover scratches, cuts, or burns with a clean, dry gauze or bandage as directed  Keep the area clean and dry  · Tell healthcare providers that you have lymphedema  Tell them not to do, IVs, blood draws, and blood pressure readings in the arm or leg with lymphedema  If there is lymphedema in both arms, ask them to take your blood pressure on your leg  Do not allow flu shots or vaccinations in your arm with lymphedema  Follow up with your healthcare provider or lymphedema specialist as directed: You will need regular visits so healthcare providers can examine the affected areas  You may also be referred to a clinic that specializes in lymphedema treatment  Write down your questions so you remember to ask them during your visits  © 2017 [employer] [employer] Information is for End User's use only and may not be sold, redistributed or otherwise used for commercial purposes  All illustrations and images included in CareNotes® are the copyrighted property of [employer]  or [employer]  The above information is an  only  It is not intended as medical advice for individual conditions or treatments  Talk to your doctor, nurse or pharmacist before following any medical regimen to see if it is safe and effective for you

## 2018-04-11 NOTE — PROGRESS NOTES
Assessment/Plan:    No problem-specific Assessment & Plan notes found for this encounter  Diagnoses and all orders for this visit:    Type 2 diabetes mellitus with diabetic mononeuropathy, without long-term current use of insulin ([facility])  -     CBC and differential; Future  -     HEMOGLOBIN A1C W/ EAG ESTIMATION; Future  -     TSH, 3rd generation; Future  -     Microalbumin / creatinine urine ratio; Future  -     glimepiride (AMARYL) 4 mg tablet; Take 1 tablet (4 mg total) by mouth 2 (two) times a day    Diabetic nephropathy associated with type 2 diabetes mellitus ([facility] [address] )    Essential hypertension  -     Comprehensive metabolic panel; Future    Primary osteoarthritis involving multiple joints    Benign prostatic hyperplasia without lower urinary tract symptoms    Lymphedema    Mixed hyperlipidemia  -     Lipid Panel with Direct LDL reflex; Future    Other orders  -     metFORMIN (GLUCOPHAGE) 1000 MG tablet; Take by mouth Daily  -     lisinopril (ZESTRIL) 10 mg tablet; Take 1 tablet by mouth daily  -     Discontinue: glimepiride (AMARYL) 4 mg tablet; Take 1 tablet by mouth 2 (two) times a day  -     finasteride (PROSCAR) 5 mg tablet; Take 1 tablet by mouth daily  -     glucose blood (FREESTYLE TEST STRIPS) test strip; by In Vitro route  -     olopatadine (PATANOL) 0 1 % ophthalmic solution; Apply 1 drop to eye 2 (two) times a day  -     tamsulosin (FLOMAX) 0 4 mg; Take 1 capsule by mouth daily  -     famotidine (PEPCID) 40 MG tablet; Take 40 mg by mouth 2 (two) times a day  -     aspirin 81 mg chewable tablet; Chew 81 mg daily      A/P: Doing well and will check labs  Continue current treatment pending labs  RTC three months  Subjective:      Patient ID: Ibis Noonan  is a 72 y o  male   RTC for f/u dm, htn, etc  Doing well and no c/o's  Sugars less than 140  Remains active w/o difficulty and no falls reported  No reflux and lymphedema is good as long as he wears his stockings  Due for labs   Vaccines and colon cancer screens are up to date  The following portions of the patient's history were reviewed and updated as appropriate: allergies, current medications, past family history, past medical history, past social history, past surgical history and problem list     Review of Systems   Constitutional: Negative for activity change, chills, diaphoresis, fatigue and fever  HENT: Negative  Eyes: Negative for visual disturbance  Respiratory: Negative for cough, chest tightness, shortness of breath and wheezing  Cardiovascular: Negative for chest pain, palpitations and leg swelling  Gastrointestinal: Negative for abdominal pain, constipation, diarrhea, nausea and vomiting  Endocrine: Negative for cold intolerance and heat intolerance  Genitourinary: Negative for difficulty urinating, dysuria and frequency  Musculoskeletal: Negative for arthralgias, gait problem and myalgias  Neurological: Negative for dizziness, seizures, light-headedness and headaches  Psychiatric/Behavioral: Negative for confusion and dysphoric mood  The patient is not nervous/anxious  Objective:      /70 (BP Location: Left arm, Patient Position: Sitting, Cuff Size: Adult)   Pulse 81   Temp 98 7 °F (37 1 °C) (Tympanic)   Resp 18   Ht 6' 4" (1 93 m)   Wt 124 kg (274 lb)   SpO2 95%   BMI 33 35 kg/m²          Physical Exam   Constitutional: He is oriented to person, place, and time  He appears well-developed and well-nourished  No distress  HENT:   Head: Normocephalic and atraumatic  Mouth/Throat: Oropharynx is clear and moist    Eyes: Conjunctivae and EOM are normal  Pupils are equal, round, and reactive to light  Neck: Neck supple  No JVD present  Cardiovascular: Normal rate and regular rhythm  No murmur heard  Pulmonary/Chest: Effort normal and breath sounds normal  No respiratory distress  He has no wheezes  Abdominal: Soft  Bowel sounds are normal  There is no tenderness  Musculoskeletal: He exhibits no edema (wearing his compression stocking  )  Neurological: He is alert and oriented to person, place, and time  Psychiatric: He has a normal mood and affect  His behavior is normal  Judgment and thought content normal    Nursing note and vitals reviewed

## 2018-06-29 ENCOUNTER — APPOINTMENT (OUTPATIENT)
Dept: LAB | Facility: CLINIC | Age: 73
End: 2018-06-29
Payer: COMMERCIAL

## 2018-06-29 DIAGNOSIS — E78.2 MIXED HYPERLIPIDEMIA: ICD-10-CM

## 2018-06-29 DIAGNOSIS — I10 ESSENTIAL HYPERTENSION: ICD-10-CM

## 2018-06-29 DIAGNOSIS — E11.41 TYPE 2 DIABETES MELLITUS WITH DIABETIC MONONEUROPATHY, WITHOUT LONG-TERM CURRENT USE OF INSULIN (HCC): ICD-10-CM

## 2018-06-29 LAB
ALBUMIN SERPL BCP-MCNC: 3.7 G/DL (ref 3.5–5)
ALP SERPL-CCNC: 69 U/L (ref 46–116)
ALT SERPL W P-5'-P-CCNC: 20 U/L (ref 12–78)
ANION GAP SERPL CALCULATED.3IONS-SCNC: 6 MMOL/L (ref 4–13)
AST SERPL W P-5'-P-CCNC: 11 U/L (ref 5–45)
BASOPHILS # BLD AUTO: 0.04 THOUSANDS/ΜL (ref 0–0.1)
BASOPHILS NFR BLD AUTO: 1 % (ref 0–1)
BILIRUB SERPL-MCNC: 1.06 MG/DL (ref 0.2–1)
BUN SERPL-MCNC: 12 MG/DL (ref 5–25)
CALCIUM SERPL-MCNC: 9 MG/DL (ref 8.3–10.1)
CHLORIDE SERPL-SCNC: 104 MMOL/L (ref 100–108)
CHOLEST SERPL-MCNC: 156 MG/DL (ref 50–200)
CO2 SERPL-SCNC: 27 MMOL/L (ref 21–32)
CREAT SERPL-MCNC: 0.91 MG/DL (ref 0.6–1.3)
CREAT UR-MCNC: 152 MG/DL
EOSINOPHIL # BLD AUTO: 0.01 THOUSAND/ΜL (ref 0–0.61)
EOSINOPHIL NFR BLD AUTO: 0 % (ref 0–6)
ERYTHROCYTE [DISTWIDTH] IN BLOOD BY AUTOMATED COUNT: 13.7 % (ref 11.6–15.1)
EST. AVERAGE GLUCOSE BLD GHB EST-MCNC: 148 MG/DL
GFR SERPL CREATININE-BSD FRML MDRD: 84 ML/MIN/1.73SQ M
GLUCOSE P FAST SERPL-MCNC: 134 MG/DL (ref 65–99)
HBA1C MFR BLD: 6.8 % (ref 4.2–6.3)
HCT VFR BLD AUTO: 45.3 % (ref 36.5–49.3)
HDLC SERPL-MCNC: 41 MG/DL (ref 40–60)
HGB BLD-MCNC: 15.3 G/DL (ref 12–17)
IMM GRANULOCYTES # BLD AUTO: 0.02 THOUSAND/UL (ref 0–0.2)
IMM GRANULOCYTES NFR BLD AUTO: 0 % (ref 0–2)
LDLC SERPL CALC-MCNC: 88 MG/DL (ref 0–100)
LYMPHOCYTES # BLD AUTO: 1.88 THOUSANDS/ΜL (ref 0.6–4.47)
LYMPHOCYTES NFR BLD AUTO: 24 % (ref 14–44)
MCH RBC QN AUTO: 31.6 PG (ref 26.8–34.3)
MCHC RBC AUTO-ENTMCNC: 33.8 G/DL (ref 31.4–37.4)
MCV RBC AUTO: 94 FL (ref 82–98)
MICROALBUMIN UR-MCNC: 6.4 MG/L (ref 0–20)
MICROALBUMIN/CREAT 24H UR: 4 MG/G CREATININE (ref 0–30)
MONOCYTES # BLD AUTO: 0.56 THOUSAND/ΜL (ref 0.17–1.22)
MONOCYTES NFR BLD AUTO: 7 % (ref 4–12)
NEUTROPHILS # BLD AUTO: 5.29 THOUSANDS/ΜL (ref 1.85–7.62)
NEUTS SEG NFR BLD AUTO: 68 % (ref 43–75)
NRBC BLD AUTO-RTO: 0 /100 WBCS
PLATELET # BLD AUTO: 143 THOUSANDS/UL (ref 149–390)
PMV BLD AUTO: 10.2 FL (ref 8.9–12.7)
POTASSIUM SERPL-SCNC: 4.3 MMOL/L (ref 3.5–5.3)
PROT SERPL-MCNC: 6.9 G/DL (ref 6.4–8.2)
RBC # BLD AUTO: 4.84 MILLION/UL (ref 3.88–5.62)
SODIUM SERPL-SCNC: 137 MMOL/L (ref 136–145)
TRIGL SERPL-MCNC: 134 MG/DL
TSH SERPL DL<=0.05 MIU/L-ACNC: 0.77 UIU/ML (ref 0.36–3.74)
WBC # BLD AUTO: 7.8 THOUSAND/UL (ref 4.31–10.16)

## 2018-06-29 PROCEDURE — 84443 ASSAY THYROID STIM HORMONE: CPT

## 2018-06-29 PROCEDURE — 83036 HEMOGLOBIN GLYCOSYLATED A1C: CPT

## 2018-06-29 PROCEDURE — 82043 UR ALBUMIN QUANTITATIVE: CPT

## 2018-06-29 PROCEDURE — 80053 COMPREHEN METABOLIC PANEL: CPT

## 2018-06-29 PROCEDURE — 80061 LIPID PANEL: CPT

## 2018-06-29 PROCEDURE — 85025 COMPLETE CBC W/AUTO DIFF WBC: CPT

## 2018-06-29 PROCEDURE — 82570 ASSAY OF URINE CREATININE: CPT

## 2018-06-29 PROCEDURE — 36415 COLL VENOUS BLD VENIPUNCTURE: CPT

## 2018-06-29 PROCEDURE — 3061F NEG MICROALBUMINURIA REV: CPT | Performed by: INTERNAL MEDICINE

## 2018-07-11 ENCOUNTER — OFFICE VISIT (OUTPATIENT)
Dept: INTERNAL MEDICINE CLINIC | Facility: CLINIC | Age: 73
End: 2018-07-11
Payer: COMMERCIAL

## 2018-07-11 VITALS
HEART RATE: 73 BPM | OXYGEN SATURATION: 98 % | BODY MASS INDEX: 32.89 KG/M2 | SYSTOLIC BLOOD PRESSURE: 130 MMHG | HEIGHT: 76 IN | DIASTOLIC BLOOD PRESSURE: 60 MMHG | TEMPERATURE: 99.8 F | WEIGHT: 270.13 LBS

## 2018-07-11 DIAGNOSIS — E53.8 VITAMIN B12 DEFICIENCY: ICD-10-CM

## 2018-07-11 DIAGNOSIS — E78.2 MIXED HYPERLIPIDEMIA: ICD-10-CM

## 2018-07-11 DIAGNOSIS — N40.0 BENIGN PROSTATIC HYPERPLASIA WITHOUT LOWER URINARY TRACT SYMPTOMS: ICD-10-CM

## 2018-07-11 DIAGNOSIS — K22.70 BARRETT'S ESOPHAGUS WITHOUT DYSPLASIA: ICD-10-CM

## 2018-07-11 DIAGNOSIS — I10 ESSENTIAL HYPERTENSION: ICD-10-CM

## 2018-07-11 DIAGNOSIS — E11.41 TYPE 2 DIABETES MELLITUS WITH DIABETIC MONONEUROPATHY, WITHOUT LONG-TERM CURRENT USE OF INSULIN (HCC): Primary | ICD-10-CM

## 2018-07-11 DIAGNOSIS — K21.9 GASTROESOPHAGEAL REFLUX DISEASE WITHOUT ESOPHAGITIS: ICD-10-CM

## 2018-07-11 DIAGNOSIS — E66.09 CLASS 1 OBESITY DUE TO EXCESS CALORIES WITH SERIOUS COMORBIDITY AND BODY MASS INDEX (BMI) OF 32.0 TO 32.9 IN ADULT: ICD-10-CM

## 2018-07-11 PROBLEM — E11.9 ENCOUNTER FOR DIABETIC FOOT EXAM (HCC): Status: RESOLVED | Noted: 2017-05-30 | Resolved: 2018-07-11

## 2018-07-11 PROBLEM — A69.20 LYME DISEASE: Status: ACTIVE | Noted: 2018-07-11

## 2018-07-11 PROCEDURE — 99214 OFFICE O/P EST MOD 30 MIN: CPT | Performed by: INTERNAL MEDICINE

## 2018-07-11 PROCEDURE — 3078F DIAST BP <80 MM HG: CPT | Performed by: INTERNAL MEDICINE

## 2018-07-11 PROCEDURE — 3725F SCREEN DEPRESSION PERFORMED: CPT | Performed by: INTERNAL MEDICINE

## 2018-07-11 PROCEDURE — 3075F SYST BP GE 130 - 139MM HG: CPT | Performed by: INTERNAL MEDICINE

## 2018-07-11 PROCEDURE — 1101F PT FALLS ASSESS-DOCD LE1/YR: CPT | Performed by: INTERNAL MEDICINE

## 2018-07-11 PROCEDURE — 3008F BODY MASS INDEX DOCD: CPT | Performed by: INTERNAL MEDICINE

## 2018-07-11 NOTE — PATIENT INSTRUCTIONS
Type 2 Diabetes in Adults   AMBULATORY CARE:   Type 2 diabetes  is a disease that affects how your body uses glucose (sugar)  Normally, when the blood sugar level increases, the pancreas makes more insulin  Insulin helps move sugar out of the blood so it can be used for energy  Type 2 diabetes develops because either the body cannot make enough insulin, or it cannot use the insulin correctly  After many years, your pancreas may stop making insulin  Common symptoms include the following:   · More hunger or thirst than usual     · Frequent urination     · Weight loss without trying     · Blurred vision  Call 911 if you have any of the following:   · You have any of the following signs of a stroke:      ¨ Numbness or drooping on one side of your face     ¨ Weakness in an arm or leg    ¨ Confusion or difficulty speaking    ¨ Dizziness, a severe headache, or vision loss    · You have any of the following signs of a heart attack:      ¨ Squeezing, pressure, or pain in your chest that lasts longer than 5 minutes or returns    ¨ Discomfort or pain in your back, neck, jaw, stomach, or arm     ¨ Trouble breathing    ¨ Nausea or vomiting    ¨ Lightheadedness or a sudden cold sweat, especially with chest pain or trouble breathing  Seek care immediately if:   · You have severe abdominal pain, or the pain spreads to your back  You may also be vomiting  · You have trouble staying awake or focusing  · You are shaking or sweating  · You have blurred or double vision  · Your breath has a fruity, sweet smell  · Your breathing is deep and labored, or rapid and shallow  · Your heartbeat is fast and weak  Contact your healthcare provider if:   · You are vomiting or have diarrhea  · You have an upset stomach and cannot eat the foods on your meal plan  · You feel weak or more tired than usual      · You feel dizzy, have headaches, or are easily irritated  · Your skin is red, warm, dry, or swollen  · You have a wound that does not heal      · You have numbness in your arms or legs  · You have trouble coping with your illness, or you feel anxious or depressed  · You have questions or concerns about your condition or care  Treatment for type 2 diabetes  includes keeping your blood sugar at a normal level  You must eat the right foods, and exercise regularly  You may need medicine if you cannot control your blood sugar level with nutrition and exercise  You may also need medicine to prevent heart disease, a complication of type 2 diabetes  You may  need any of the following:  · Hypoglycemic medicines or insulin  may be given to decrease the amount of sugar in your blood  · Blood pressure medicine  may be given to lower your blood pressure  Your blood pressure should be less than 140/90  · Cholesterol lowering medicine  may be given to prevent heart disease  · Antiplatelets , such as aspirin, help prevent blood clots  Take your antiplatelet medicine exactly as directed  These medicines make it more likely for you to bleed or bruise  If you are told to take aspirin, do not take acetaminophen or ibuprofen instead  · Take your medicine as directed  Contact your healthcare provider if you think your medicine is not helping or if you have side effects  Tell him or her if you are allergic to any medicine  Keep a list of the medicines, vitamins, and herbs you take  Include the amounts, and when and why you take them  Bring the list or the pill bottles to follow-up visits  Carry your medicine list with you in case of an emergency  Check your blood sugar level: You will be taught how to check a small drop of blood in a glucose monitor  You will need to check your blood sugar level at least 3 times each day if you are on insulin  Ask your healthcare provider when and how often to check during the day  If you check your blood sugar level before a meal , it should be between 80 and 130 mg/dL   If you check your blood sugar level 1 to 2 hours after a meal , it should be less than 180 mg/dL  Ask your healthcare provider if these are good goals for you  Write down your results, and show them to your healthcare provider  He may use the results to make changes to your medicine, food, and exercise schedules  If your blood sugar level is too low: Your blood sugar level is too low if it goes below 70 mg/dL  If the level is too low, eat or drink 15 grams of fast-acting carbohydrate  These are found naturally in fruits  Fast-acting carbohydrates will raise your blood sugar level quickly  Examples of 15 grams of fast-acting carbohydrate are 4 ounces (½ cup) of fruit juice or 4 ounces of regular soda  Other examples are 2 tablespoons of raisins or 3 to 4 glucose tablets  Check your blood sugar level 15 minutes later  If the level is still low (less than 100 mg/dL), eat another 15 grams of carbohydrate  When the level returns to 100 mg/dL, eat a snack or meal that contains carbohydrates  This will help prevent another drop in blood sugar  Always carefully follow your healthcare provider's instructions on how to treat low blood sugar levels  Check your feet each day for sores:  Wear shoes and socks that fit correctly  Do not trim your toenails  Ask your healthcare provider for more information about foot care  Follow your meal plan:  A dietitian will help you make a meal plan to keep your blood sugar level steady  Do not skip meals  Your blood sugar level may drop too low if you have taken diabetes medicine and do not eat  · Keep track of carbohydrates (sugar and starchy foods)  Your blood sugar level can get too high if you eat too many carbohydrates  Your dietitian will help you plan meals and snacks that have the right amount of carbohydrates  · Eat low-fat foods , such as skinless chicken and low-fat milk  · Eat less sodium (salt)    Limit high-sodium foods, such as soy sauce, potato chips, and soup  Do not add salt to food you cook  Limit your use of table salt  You should have less than 2,300 mg of sodium per day  · Eat high-fiber foods , such as vegetables, whole grain breads, and beans  · Limit alcohol  Alcohol affects your blood sugar level and can make it harder to manage your diabetes  Limit alcohol to 1 drink a day if you are a woman  Limit alcohol to 2 drinks a day if you are a man  A drink of alcohol is 12 ounces of beer, 5 ounces of wine, or 1½ ounces of liquor  Maintain a healthy weight:  Ask your healthcare provider how much you should weigh  A healthy weight can help you control your diabetes  Ask your provider to help you create a weight loss plan if you are overweight  Together you can set manageable weight loss goals  Exercise as directed:  Exercise can help keep your blood sugar level steady, decrease your risk of heart disease, and help you lose weight  Stretch before and after you exercise  Exercise for at least 150 minutes every week  Spread this amount of exercise over at least 3 days a week  Do not skip exercise more than 2 days in a row  Include muscle strengthening activities 2 to 3 days each week  Older adults should include balance training 2 to 3 times each week  Activities that help increase balance include yoga and karin chi  Work with your healthcare provider to create an exercise plan  · Check your blood sugar level before and after exercise  Healthcare providers may tell you to change the amount of insulin you take or food you eat  If your blood sugar level is high, check your blood or urine for ketones before you exercise  Do not exercise if your blood sugar level is high and you have ketones  · If your blood sugar level is less than 100 mg/dL, have a carbohydrate snack before you exercise  Examples are 4 to 6 crackers, ½ banana, 8 ounces (1 cup) of milk, or 4 ounces (½ cup) of juice   Drink water or liquids that do not contain sugar before, during, and after exercise  Ask your dietitian or healthcare provider which liquids you should drink when you exercise  · Do not sit for longer than 30 minutes  If you cannot walk around, at least stand up  This will help you stay active and keep your blood circulating  Do not smoke:  Nicotine and other chemicals in cigarettes and cigars can cause lung damage and make it more difficult to manage your diabetes  Ask your healthcare provider for information if you currently smoke and need help to quit  Do not use e-cigarettes or smokeless tobacco in place of cigarettes or to help you quit  They still contain nicotine  Check your blood pressure as directed:  Ask your healthcare provider what your blood pressure should be  Most adults with diabetes and high blood pressure should have a systolic blood pressure (first number) less than 140  Your diastolic blood pressure (second number) should be less than 90  Wear medical alert identification:  Wear medical alert jewelry or carry a card that says you have diabetes  Ask your healthcare provider where to get these items  Ask about vaccines: You have a higher risk for serious illness if you get the flu, pneumonia, or hepatitis  Ask your healthcare provider if you should get a flu, pneumonia, or hepatitis B vaccine, and when to get the vaccine  Follow up with your healthcare provider as directed: You may need to return to have your A1c checked every 3 months  You will need to return at least once each year to have your feet checked  You will need an eye exam once a year to check for retinopathy  You will also need urine tests every year to check for kidney problems  You may need tests to monitor for heart disease such as an EKG, stress test, blood pressure monitoring, and blood tests  Write down your questions so you remember to ask them during your visits     © 2017 [employer] [employer] Information is for End User's use only and may not be sold, redistributed or otherwise used for commercial purposes  All illustrations and images included in CareNotes® are the copyrighted property of [employer]  or [employer]  The above information is an  only  It is not intended as medical advice for individual conditions or treatments  Talk to your doctor, nurse or pharmacist before following any medical regimen to see if it is safe and effective for you

## 2018-07-11 NOTE — PROGRESS NOTES
Assessment/Plan:    No problem-specific Assessment & Plan notes found for this encounter  Diagnoses and all orders for this visit:    Type 2 diabetes mellitus with diabetic mononeuropathy, without long-term current use of insulin (HCC)    Essential hypertension    Gastroesophageal reflux disease without esophagitis    Benign prostatic hyperplasia without lower urinary tract symptoms    Mixed hyperlipidemia    Class 1 obesity due to excess calories with serious comorbidity and body mass index (BMI) of 32 0 to 32 9 in adult    Vitamin B12 deficiency    Alcantar's esophagus without dysplasia      A/P: Doing well and sugars are good  Discussed the shingles vaccine and told him to get the shingrix  Not due for Td until next year and no current injuries  Continue current treatment and keep f/u with GI  RTC three months for routine  Subjective:      Patient ID: Pedrito Montenegro  is a 72 y o  male  WM RTC for f/u dm, htn, etc  Doing well and no c/o's  Just dx with Alcantar's and on H2 blocker and PPI  Remains active w/o difficulty and no falls reported  Labs just done and sugars are good  Leg pain is controlled  Received a notice from the pharmacy about vaccines  The following portions of the patient's history were reviewed and updated as appropriate:   He  has a past medical history of Arthritis; Chronic kidney disease; Diabetes mellitus ([address] ); Eustachian tube dysfunction; Gastroesophageal reflux disease without esophagitis (4/11/2018); Hypertension; Obesity; and Scrotal bleeding    He   Patient Active Problem List    Diagnosis Date Noted    Lyme disease 07/11/2018    Alcantar's esophagus without dysplasia 07/11/2018    Gastroesophageal reflux disease without esophagitis 04/11/2018    Sleep disturbances 04/08/2016    Diabetes with neurologic complications ([facility] [address] ) 62/12/0445    Lymphedema 04/17/2013    Hyperlipidemia 11/15/2012    Benign prostate hyperplasia 05/22/2012    Colon, diverticulosis 05/22/2012    Diabetic nephropathy ([facility] [address] ) 05/22/2012    Erectile dysfunction of non-organic origin 05/22/2012    Hypertension 05/22/2012    Obesity 05/22/2012    Osteoarthritis 05/22/2012    Polyp of sigmoid colon 05/22/2012    Vitamin B12 deficiency 05/22/2012     He  has a past surgical history that includes Cholecystectomy; Tumor removal; Total knee arthroplasty (Left); Total knee arthroplasty (Right); and Umbilical hernia repair  His family history includes No Known Problems in his mother  He  reports that he has quit smoking  He has never used smokeless tobacco  He reports that he drinks alcohol  He reports that he does not use drugs  Current Outpatient Prescriptions   Medication Sig Dispense Refill    aspirin 81 mg chewable tablet Chew 81 mg daily      famotidine (PEPCID) 40 MG tablet Take 40 mg by mouth 2 (two) times a day      finasteride (PROSCAR) 5 mg tablet Take 1 tablet by mouth daily      glimepiride (AMARYL) 4 mg tablet Take 1 tablet (4 mg total) by mouth 2 (two) times a day 180 tablet 3    glucose blood (FREESTYLE TEST STRIPS) test strip by In Vitro route      lisinopril (ZESTRIL) 10 mg tablet Take 1 tablet by mouth daily      metFORMIN (GLUCOPHAGE) 1000 MG tablet Take by mouth Daily      olopatadine (PATANOL) 0 1 % ophthalmic solution Apply 1 drop to eye 2 (two) times a day      tamsulosin (FLOMAX) 0 4 mg Take 1 capsule by mouth daily       No current facility-administered medications for this visit        Current Outpatient Prescriptions on File Prior to Visit   Medication Sig    aspirin 81 mg chewable tablet Chew 81 mg daily    famotidine (PEPCID) 40 MG tablet Take 40 mg by mouth 2 (two) times a day    finasteride (PROSCAR) 5 mg tablet Take 1 tablet by mouth daily    glimepiride (AMARYL) 4 mg tablet Take 1 tablet (4 mg total) by mouth 2 (two) times a day    glucose blood (FREESTYLE TEST STRIPS) test strip by In Vitro route    lisinopril (ZESTRIL) 10 mg tablet Take 1 tablet by mouth daily    metFORMIN (GLUCOPHAGE) 1000 MG tablet Take by mouth Daily    olopatadine (PATANOL) 0 1 % ophthalmic solution Apply 1 drop to eye 2 (two) times a day    tamsulosin (FLOMAX) 0 4 mg Take 1 capsule by mouth daily     No current facility-administered medications on file prior to visit  He is allergic to sulfa antibiotics       Review of Systems   Constitutional: Negative for activity change, chills, diaphoresis, fatigue and fever  HENT: Negative  Eyes: Negative for visual disturbance  Respiratory: Negative for cough, chest tightness, shortness of breath and wheezing  Cardiovascular: Positive for leg swelling  Negative for chest pain and palpitations  Gastrointestinal: Negative for abdominal pain, constipation, diarrhea, nausea and vomiting  Endocrine: Negative for cold intolerance and heat intolerance  Genitourinary: Negative for difficulty urinating, dysuria and frequency  Musculoskeletal: Negative for arthralgias, gait problem and myalgias  Neurological: Negative for dizziness, tremors, seizures, weakness, light-headedness and headaches  Psychiatric/Behavioral: Negative for confusion and dysphoric mood  The patient is not nervous/anxious  Objective:      /60   Pulse 73   Temp 99 8 °F (37 7 °C)   Ht 6' 4" (1 93 m)   Wt 123 kg (270 lb 2 oz)   SpO2 98%   BMI 32 88 kg/m²          Physical Exam   Constitutional: He is oriented to person, place, and time  He appears well-developed and well-nourished  No distress  HENT:   Head: Normocephalic and atraumatic  Mouth/Throat: Oropharynx is clear and moist    Eyes: Conjunctivae and EOM are normal  Pupils are equal, round, and reactive to light  Neck: Neck supple  No JVD present  Cardiovascular: Normal rate, regular rhythm and normal heart sounds  No murmur heard  Pulmonary/Chest: Effort normal and breath sounds normal  No respiratory distress  He has no wheezes  Abdominal: Soft   Bowel sounds are normal  He exhibits no distension  There is no tenderness  Musculoskeletal: He exhibits edema (billat 1/4 with compression stocking  )  Neurological: He is alert and oriented to person, place, and time  Psychiatric: He has a normal mood and affect  His behavior is normal  Judgment and thought content normal    Nursing note and vitals reviewed

## 2018-08-31 DIAGNOSIS — N40.0 BENIGN PROSTATIC HYPERPLASIA WITHOUT LOWER URINARY TRACT SYMPTOMS: Primary | ICD-10-CM

## 2018-08-31 RX ORDER — TAMSULOSIN HYDROCHLORIDE 0.4 MG/1
CAPSULE ORAL
Qty: 90 CAPSULE | Refills: 3 | Status: SHIPPED | OUTPATIENT
Start: 2018-08-31 | End: 2019-10-17

## 2018-09-14 DIAGNOSIS — E11.8 TYPE 2 DIABETES MELLITUS WITH COMPLICATION, UNSPECIFIED WHETHER LONG TERM INSULIN USE: Primary | ICD-10-CM

## 2018-10-12 ENCOUNTER — OFFICE VISIT (OUTPATIENT)
Dept: INTERNAL MEDICINE CLINIC | Facility: CLINIC | Age: 73
End: 2018-10-12
Payer: COMMERCIAL

## 2018-10-12 VITALS
SYSTOLIC BLOOD PRESSURE: 148 MMHG | WEIGHT: 273 LBS | RESPIRATION RATE: 18 BRPM | TEMPERATURE: 97.6 F | HEIGHT: 76 IN | OXYGEN SATURATION: 94 % | HEART RATE: 81 BPM | DIASTOLIC BLOOD PRESSURE: 72 MMHG | BODY MASS INDEX: 33.24 KG/M2

## 2018-10-12 DIAGNOSIS — E78.2 MIXED HYPERLIPIDEMIA: ICD-10-CM

## 2018-10-12 DIAGNOSIS — E53.8 VITAMIN B12 DEFICIENCY: ICD-10-CM

## 2018-10-12 DIAGNOSIS — E11.41 TYPE 2 DIABETES MELLITUS WITH DIABETIC MONONEUROPATHY, WITHOUT LONG-TERM CURRENT USE OF INSULIN (HCC): Primary | ICD-10-CM

## 2018-10-12 DIAGNOSIS — E11.21 DIABETIC NEPHROPATHY ASSOCIATED WITH TYPE 2 DIABETES MELLITUS (HCC): ICD-10-CM

## 2018-10-12 DIAGNOSIS — I10 ESSENTIAL HYPERTENSION: ICD-10-CM

## 2018-10-12 DIAGNOSIS — R10.32 LLQ PAIN: ICD-10-CM

## 2018-10-12 DIAGNOSIS — K22.70 BARRETT'S ESOPHAGUS WITHOUT DYSPLASIA: ICD-10-CM

## 2018-10-12 PROBLEM — M48.07 SPINAL STENOSIS OF LUMBOSACRAL REGION: Status: ACTIVE | Noted: 2018-10-12

## 2018-10-12 PROCEDURE — 96372 THER/PROPH/DIAG INJ SC/IM: CPT | Performed by: INTERNAL MEDICINE

## 2018-10-12 PROCEDURE — 99214 OFFICE O/P EST MOD 30 MIN: CPT | Performed by: INTERNAL MEDICINE

## 2018-10-12 PROCEDURE — 1160F RVW MEDS BY RX/DR IN RCRD: CPT

## 2018-10-12 PROCEDURE — 4040F PNEUMOC VAC/ADMIN/RCVD: CPT | Performed by: INTERNAL MEDICINE

## 2018-10-12 PROCEDURE — 1036F TOBACCO NON-USER: CPT | Performed by: INTERNAL MEDICINE

## 2018-10-12 PROCEDURE — 1160F RVW MEDS BY RX/DR IN RCRD: CPT | Performed by: INTERNAL MEDICINE

## 2018-10-12 RX ORDER — MELOXICAM 15 MG/1
15 TABLET ORAL DAILY
Qty: 30 TABLET | Refills: 0 | Status: SHIPPED | OUTPATIENT
Start: 2018-10-12 | End: 2019-01-14

## 2018-10-12 RX ORDER — KETOROLAC TROMETHAMINE 30 MG/ML
60 INJECTION, SOLUTION INTRAMUSCULAR; INTRAVENOUS ONCE
Status: COMPLETED | OUTPATIENT
Start: 2018-10-12 | End: 2018-10-12

## 2018-10-12 RX ADMIN — KETOROLAC TROMETHAMINE 60 MG: 30 INJECTION, SOLUTION INTRAMUSCULAR; INTRAVENOUS at 13:13

## 2018-10-12 NOTE — PROGRESS NOTES
Assessment/Plan:    No problem-specific Assessment & Plan notes found for this encounter  Diagnoses and all orders for this visit:    Type 2 diabetes mellitus with diabetic mononeuropathy, without long-term current use of insulin (HCC)  -     Hemoglobin A1C; Future    Diabetic nephropathy associated with type 2 diabetes mellitus (HCC)    Alcantar's esophagus without dysplasia    Essential hypertension    Mixed hyperlipidemia    Vitamin B12 deficiency    LLQ pain  -     meloxicam (MOBIC) 15 mg tablet; Take 1 tablet (15 mg total) by mouth daily  -     ketorolac (TORADOL) 60 mg/2 mL IM injection 60 mg; Inject 2 mL (60 mg total) into a muscle once       A/P: Doing well  Suspect more of MS etiology than back, GI, or renal causes  Will start a short coarse of NSAID's  Check labs  Vaccines up to date  Continue current treatment and RTC three months for routine  Subjective:      Patient ID: Valente Lewis  is a 72 y o  male  WM RTC for f/u dm, htn, etc  Doing well and no new issues except for a one week h/o pain over the left lower flank radiating into the LLQ  Mild and improving  Start after waking up  Sleeps on his right side  NO lesion  NO blood in the urine or stool  Worse with certain motion  Sugars less than 140  No GERD s/s  DJD is controlled  Lymphedema no worse  Due for labs  The following portions of the patient's history were reviewed and updated as appropriate:   He  has a past medical history of Arthritis; Chronic kidney disease; Diabetes mellitus ([address] ); Eustachian tube dysfunction; Gastroesophageal reflux disease without esophagitis (4/11/2018); Hypertension; Obesity; and Scrotal bleeding    He   Patient Active Problem List    Diagnosis Date Noted    Spinal stenosis of lumbosacral region 10/12/2018    Lyme disease 07/11/2018    Alcantar's esophagus without dysplasia 07/11/2018    Gastroesophageal reflux disease without esophagitis 04/11/2018    Sleep disturbances 04/08/2016    Diabetes with neurologic complications ([facility] ) 65/07/6642    Lymphedema 04/17/2013    Hyperlipidemia 11/15/2012    Benign prostate hyperplasia 05/22/2012    Colon, diverticulosis 05/22/2012    Diabetic nephropathy ([facility] ) 05/22/2012    Erectile dysfunction of non-organic origin 05/22/2012    Hypertension 05/22/2012    Obesity 05/22/2012    Osteoarthritis 05/22/2012    Polyp of sigmoid colon 05/22/2012    Vitamin B12 deficiency 05/22/2012     He  has a past surgical history that includes Cholecystectomy; Tumor removal; Total knee arthroplasty (Left); Total knee arthroplasty (Right); and Umbilical hernia repair  His family history includes No Known Problems in his mother  He  reports that he has quit smoking  He has never used smokeless tobacco  He reports that he drinks alcohol  He reports that he does not use drugs  Current Outpatient Prescriptions   Medication Sig Dispense Refill    aspirin 81 mg chewable tablet Chew 81 mg daily      finasteride (PROSCAR) 5 mg tablet Take 1 tablet by mouth daily      glimepiride (AMARYL) 4 mg tablet Take 1 tablet (4 mg total) by mouth 2 (two) times a day 180 tablet 3    glucose blood (FREESTYLE TEST STRIPS) test strip by In Vitro route      lisinopril (ZESTRIL) 10 mg tablet Take 1 tablet by mouth daily      metFORMIN (GLUCOPHAGE) 1000 MG tablet Take 1 Tablet in the AM, one half tablet at noon and 1 tablet in the evening 225 tablet 1    olopatadine (PATANOL) 0 1 % ophthalmic solution Apply 1 drop to eye 2 (two) times a day      tamsulosin (FLOMAX) 0 4 mg TAKE 1 CAPSULE DAILY 90 capsule 3    meloxicam (MOBIC) 15 mg tablet Take 1 tablet (15 mg total) by mouth daily 30 tablet 0     No current facility-administered medications for this visit        Current Outpatient Prescriptions on File Prior to Visit   Medication Sig    aspirin 81 mg chewable tablet Chew 81 mg daily    finasteride (PROSCAR) 5 mg tablet Take 1 tablet by mouth daily    glimepiride (AMARYL) 4 mg tablet Take 1 tablet (4 mg total) by mouth 2 (two) times a day    glucose blood (FREESTYLE TEST STRIPS) test strip by In Vitro route    lisinopril (ZESTRIL) 10 mg tablet Take 1 tablet by mouth daily    metFORMIN (GLUCOPHAGE) 1000 MG tablet Take 1 Tablet in the AM, one half tablet at noon and 1 tablet in the evening    olopatadine (PATANOL) 0 1 % ophthalmic solution Apply 1 drop to eye 2 (two) times a day    tamsulosin (FLOMAX) 0 4 mg TAKE 1 CAPSULE DAILY    [DISCONTINUED] famotidine (PEPCID) 40 MG tablet Take 40 mg by mouth 2 (two) times a day     No current facility-administered medications on file prior to visit  He is allergic to sulfa antibiotics       Review of Systems   Constitutional: Negative for activity change, chills, diaphoresis, fatigue and fever  HENT: Negative  Eyes: Negative for visual disturbance  Respiratory: Negative for cough, chest tightness, shortness of breath and wheezing  Cardiovascular: Negative for chest pain, palpitations and leg swelling  Gastrointestinal: Positive for abdominal pain  Negative for constipation, diarrhea, nausea and vomiting  Endocrine: Negative for cold intolerance and heat intolerance  Genitourinary: Positive for flank pain  Negative for difficulty urinating, dysuria, frequency and hematuria  Musculoskeletal: Negative for arthralgias, gait problem and myalgias  Neurological: Negative for dizziness, tremors, seizures, syncope, weakness, light-headedness and headaches  Psychiatric/Behavioral: Negative for confusion and dysphoric mood  The patient is not nervous/anxious  Objective:      /72 (BP Location: Left arm, Patient Position: Sitting, Cuff Size: Adult)   Pulse 81   Temp 97 6 °F (36 4 °C) (Tympanic)   Resp 18   Ht 6' 4" (1 93 m)   Wt 124 kg (273 lb)   SpO2 94%   BMI 33 23 kg/m²          Physical Exam   Constitutional: He is oriented to person, place, and time  He appears well-developed and well-nourished   No distress  HENT:   Head: Normocephalic and atraumatic  Mouth/Throat: Oropharynx is clear and moist    Eyes: Pupils are equal, round, and reactive to light  Conjunctivae and EOM are normal    Neck: Neck supple  No JVD present  Cardiovascular: Normal rate, regular rhythm and normal heart sounds  No murmur heard  Pulmonary/Chest: Breath sounds normal  No respiratory distress  He has no wheezes  Abdominal: Soft  Bowel sounds are normal  He exhibits no distension  There is no tenderness  Musculoskeletal: He exhibits edema (trace bilat edema, but wearing compression stockings  ) and tenderness  Tenderness with palpation along the left lateral flank and LLQ  Superficial  No lesions  Neurological: He is alert and oriented to person, place, and time  Psychiatric: He has a normal mood and affect  His behavior is normal  Judgment and thought content normal    Nursing note and vitals reviewed

## 2018-10-12 NOTE — PATIENT INSTRUCTIONS
Type 2 Diabetes in Adults   WHAT YOU NEED TO KNOW:   What is type 2 diabetes? Type 2 diabetes is a disease that affects how your body uses glucose (sugar)  Normally, when the blood sugar level increases, the pancreas makes more insulin  Insulin helps move sugar out of the blood so it can be used for energy  Type 2 diabetes develops because either the body cannot make enough insulin, or it cannot use the insulin correctly  After many years, your pancreas may stop making insulin  What increases my risk for type 2 diabetes? · Obesity    · Physical inactivity    · Older age    · High blood pressure or high cholesterol    · A history of heart disease, gestational diabetes, or polycystic ovary syndrome     · A family member with diabetes    · Being [place] American, , , [place], or [facility]  What are the signs and symptoms of type 2 diabetes? You may have high blood sugar levels for a long time before symptoms appear  You may have any of the following:  · More hunger or thirst than usual     · Frequent urination     · Weight loss without trying     · Blurred vision  How is type 2 diabetes diagnosed? You may need tests to check for type 2 diabetes starting at age 39  You may need any of the following:  · An A1c test  shows the average amount of sugar in your blood over the past 2 to 3 months  Your healthcare provider will tell you the A1c level that is right for you  The goal for your A1c is usually below 7%  Your provider can help you make changes if a check shows the A1c is too high  · A fasting plasma glucose test  is when your blood sugar level is tested after you have not eaten for 8 hours  · A 2-hour plasma glucose test  starts with a blood sugar level check after you have not eaten for 8 hours  You are then given a glucose drink  Your blood sugar level is checked after 2 hours       · A random glucose test  may be done any time of day, no matter how long ago you ate   How is type 2 diabetes treated? Type 2 diabetes can be controlled to prevent damage to your heart, blood vessels, and other organs  The goal is to keep your blood sugar at a normal level  You must eat the right foods, and exercise regularly  You may need 1 or more hypoglycemic medicines or insulin if you cannot control your blood sugar level with nutrition and exercise  You may also need medicine to lower your risk for heart disease  An example includes medicine to lower or control your cholesterol  How do I check my blood sugar level? You will be taught how to check a small drop of blood in a glucose monitor  You will need to check your blood sugar level at least 3 times each day if you are on insulin  Ask your healthcare provider when and how often to check during the day  If you check your blood sugar level before a meal , it should be between 80 and 130 mg/dL  If you check your blood sugar level 1 to 2 hours after a meal , it should be less than 180 mg/dL  Ask your healthcare provider if these are good goals for you  Write down your results, and show them to your healthcare provider  Your provider may use the results to make changes to your medicine, food, and exercise schedules  What should I do if my blood sugar level is too low? Your blood sugar level is too low if it goes below 70 mg/dL  If the level is too low, eat or drink 15 grams of fast-acting carbohydrate  These are found naturally in fruits  Fast-acting carbohydrates will raise your blood sugar level quickly  Examples of 15 grams of fast-acting carbohydrate are 4 ounces (½ cup) of fruit juice or 4 ounces of regular soda  Other examples are 2 tablespoons of raisins or 3 to 4 glucose tablets  Check your blood sugar level 15 minutes later  If the level is still low (less than 100 mg/dL), eat another 15 grams of carbohydrate  When the level returns to 100 mg/dL, eat a snack or meal that contains carbohydrates   This will help prevent another drop in blood sugar  Always carefully follow your healthcare provider's instructions on how to treat low blood sugar levels  What do I need to know about nutrition? A dietitian will help you make a meal plan to keep your blood sugar level steady  Do not skip meals  Your blood sugar level may drop too low if you have taken diabetes medicine and do not eat  · Keep track of carbohydrates (sugar and starchy foods)  Your blood sugar level can get too high if you eat too many carbohydrates  Eat fruits, legumes, vegetables, and whole grains  Your dietitian will help you plan meals and snacks that have the right amount of carbohydrates  · Eat low-fat foods , such as skinless chicken and low-fat milk  · Eat less sodium (salt)  Limit high-sodium foods, such as soy sauce, potato chips, and soup  Do not add salt to food you cook  Limit your use of table salt  You should have less than 2,300 mg of sodium per day  · Eat high-fiber foods , such as vegetables, whole-grain breads, and beans  · Limit alcohol  Alcohol affects your blood sugar level and can make it harder to manage your diabetes  Limit alcohol to 1 drink a day if you are a woman  Limit alcohol to 2 drinks a day if you are a man  A drink of alcohol is 12 ounces of beer, 5 ounces of wine, or 1½ ounces of liquor  How much exercise do I need? Exercise can help keep your blood sugar level steady, decrease your risk of heart disease, and help you lose weight  Stretch before and after you exercise  Exercise for at least 150 minutes every week  Spread this amount of exercise over at least 3 days a week  Do not skip exercise more than 2 days in a row  Include muscle strengthening activities 2 to 3 days each week  Older adults should include balance training 2 to 3 times each week  Activities that help increase balance include yoga and karin chi  Work with your healthcare provider to create an exercise plan    · Check your blood sugar level before and after exercise  Healthcare providers may tell you to change the amount of insulin you take or food you eat  If your blood sugar level is high, check your blood or urine for ketones before you exercise  Do not exercise if your blood sugar level is high and you have ketones  · If your blood sugar level is less than 100 mg/dL, have a carbohydrate snack before you exercise  Examples are 4 to 6 crackers, ½ banana, 8 ounces (1 cup) of milk, or 4 ounces (½ cup) of juice  Drink water or liquids that do not contain sugar before, during, and after exercise  Ask your dietitian or healthcare provider which liquids you should drink when you exercise  · Do not sit for longer than 30 minutes  If you cannot walk around, at least stand up  This will help you stay active and keep your blood circulating  What else can I do to manage type 2 diabetes? · Check your feet each day for sores  Wear shoes and socks that fit correctly  Do not trim your toenails  Ask your healthcare provider for more information about foot care  · Maintain a healthy weight  Ask your healthcare provider how much you should weigh  A healthy weight can help you control your diabetes and prevent heart disease  Ask your provider to help you create a weight loss plan if you are overweight  Together you can set manageable weight loss goals  · Do not smoke  Nicotine and other chemicals in cigarettes and cigars can cause lung damage and make it more difficult to manage your diabetes  Ask your healthcare provider for information if you currently smoke and need help to quit  Do not use e-cigarettes or smokeless tobacco in place of cigarettes or to help you quit  They still contain nicotine  · Check your blood pressure as directed  Ask your healthcare provider what your blood pressure should be  Most adults with diabetes and high blood pressure should have a systolic blood pressure (first number) less than 140   Your diastolic blood pressure (second number) should be less than 90  · Wear medical alert identification  Wear medical alert jewelry or carry a card that says you have diabetes  Ask your healthcare provider where to get these items  · Ask about vaccines  You have a higher risk for serious illness if you get the flu, pneumonia, or hepatitis  Ask your healthcare provider if you should get a flu, pneumonia, or hepatitis B vaccine, and when to get the vaccine  What are the risks of type 2 diabetes? Uncontrolled diabetes can damage your nerves, veins, and arteries  High blood sugar levels may damage other body tissue and organs over time  Damage to arteries may increase your risk for heart attack and stroke  Nerve damage may also lead to other heart, stomach, and nerve problems  Diabetes is life-threatening if it is not controlled  Control your blood glucose levels to prevent health problems  Call 911 for any of the following:   · You have any of the following signs of a stroke:      ¨ Numbness or drooping on one side of your face     ¨ Weakness in an arm or leg    ¨ Confusion or difficulty speaking    ¨ Dizziness, a severe headache, or vision loss    · You have any of the following signs of a heart attack:      ¨ Squeezing, pressure, or pain in your chest that lasts longer than 5 minutes or returns    ¨ Discomfort or pain in your back, neck, jaw, stomach, or arm     ¨ Trouble breathing    ¨ Nausea or vomiting    ¨ Lightheadedness or a sudden cold sweat, especially with chest pain or trouble breathing  When should I seek immediate care? · You have severe abdominal pain, or the pain spreads to your back  You may also be vomiting  · You have trouble staying awake or focusing  · You are shaking or sweating  · You have blurred or double vision  · Your breath has a fruity, sweet smell  · Your breathing is deep and labored, or rapid and shallow  · Your heartbeat is fast and weak    When should I contact my healthcare provider? · You are vomiting or have diarrhea  · You have an upset stomach and cannot eat the foods on your meal plan  · You feel weak or more tired than usual      · You feel dizzy, have headaches, or are easily irritated  · Your skin is red, warm, dry, or swollen  · You have a wound that does not heal      · You have numbness in your arms or legs  · You have trouble coping with your illness, or you feel anxious or depressed  · You have questions or concerns about your condition or care  CARE AGREEMENT:   You have the right to help plan your care  Learn about your health condition and how it may be treated  Discuss treatment options with your caregivers to decide what care you want to receive  You always have the right to refuse treatment  The above information is an  only  It is not intended as medical advice for individual conditions or treatments  Talk to your doctor, nurse or pharmacist before following any medical regimen to see if it is safe and effective for you  © 2017 [employer] Information is for End User's use only and may not be sold, redistributed or otherwise used for commercial purposes  All illustrations and images included in CareNotes® are the copyrighted property of [employer]  or [employer]

## 2018-10-16 ENCOUNTER — TELEPHONE (OUTPATIENT)
Dept: INTERNAL MEDICINE CLINIC | Facility: CLINIC | Age: 73
End: 2018-10-16

## 2018-10-16 NOTE — TELEPHONE ENCOUNTER
Call pt, pepcid was taken off the list because he told the staff he wasn't taking it  Should continue the pepcid or something similar due the fraga's  Let me know

## 2018-10-19 ENCOUNTER — TELEPHONE (OUTPATIENT)
Dept: INTERNAL MEDICINE CLINIC | Facility: CLINIC | Age: 73
End: 2018-10-19

## 2018-10-19 NOTE — TELEPHONE ENCOUNTER
----- Message from Latasha Ha DO sent at 10/12/2018  1:09 PM EDT -----  Call pt in one week and get update on stomach pain

## 2018-11-27 DIAGNOSIS — E11.41 TYPE 2 DIABETES MELLITUS WITH DIABETIC MONONEUROPATHY, WITHOUT LONG-TERM CURRENT USE OF INSULIN (HCC): ICD-10-CM

## 2018-11-28 RX ORDER — GLIMEPIRIDE 4 MG/1
TABLET ORAL
Qty: 180 TABLET | Refills: 3 | Status: SHIPPED | OUTPATIENT
Start: 2018-11-28 | End: 2019-01-14

## 2018-12-07 ENCOUNTER — TELEPHONE (OUTPATIENT)
Dept: INTERNAL MEDICINE CLINIC | Facility: CLINIC | Age: 73
End: 2018-12-07

## 2018-12-07 NOTE — TELEPHONE ENCOUNTER
----- Message from Rian Avitia DO sent at 12/7/2018  2:44 PM EST -----  Call pt, his wife ratted him out and his low sugars  Have him stop the amaryl and call back with sugars in two weeks to adjust  May need to restart at a lower dose and once a day

## 2019-01-02 ENCOUNTER — APPOINTMENT (OUTPATIENT)
Dept: LAB | Facility: CLINIC | Age: 74
End: 2019-01-02
Payer: COMMERCIAL

## 2019-01-02 DIAGNOSIS — E11.41 TYPE 2 DIABETES MELLITUS WITH DIABETIC MONONEUROPATHY, WITHOUT LONG-TERM CURRENT USE OF INSULIN (HCC): ICD-10-CM

## 2019-01-02 LAB
EST. AVERAGE GLUCOSE BLD GHB EST-MCNC: 171 MG/DL
HBA1C MFR BLD: 7.6 % (ref 4.2–6.3)

## 2019-01-02 PROCEDURE — 36415 COLL VENOUS BLD VENIPUNCTURE: CPT

## 2019-01-02 PROCEDURE — 83036 HEMOGLOBIN GLYCOSYLATED A1C: CPT

## 2019-01-03 DIAGNOSIS — N40.0 BENIGN PROSTATIC HYPERPLASIA WITHOUT LOWER URINARY TRACT SYMPTOMS: Primary | ICD-10-CM

## 2019-01-03 RX ORDER — FINASTERIDE 5 MG/1
TABLET, FILM COATED ORAL
Qty: 90 TABLET | Refills: 3 | Status: SHIPPED | OUTPATIENT
Start: 2019-01-03 | End: 2019-12-27 | Stop reason: SDUPTHER

## 2019-01-14 ENCOUNTER — OFFICE VISIT (OUTPATIENT)
Dept: INTERNAL MEDICINE CLINIC | Facility: CLINIC | Age: 74
End: 2019-01-14
Payer: COMMERCIAL

## 2019-01-14 VITALS
BODY MASS INDEX: 33.24 KG/M2 | HEART RATE: 80 BPM | RESPIRATION RATE: 18 BRPM | DIASTOLIC BLOOD PRESSURE: 70 MMHG | HEIGHT: 76 IN | WEIGHT: 273 LBS | TEMPERATURE: 97.8 F | SYSTOLIC BLOOD PRESSURE: 130 MMHG

## 2019-01-14 DIAGNOSIS — N40.0 BENIGN PROSTATIC HYPERPLASIA WITHOUT LOWER URINARY TRACT SYMPTOMS: ICD-10-CM

## 2019-01-14 DIAGNOSIS — E53.8 VITAMIN B12 DEFICIENCY: ICD-10-CM

## 2019-01-14 DIAGNOSIS — E11.21 DIABETIC NEPHROPATHY ASSOCIATED WITH TYPE 2 DIABETES MELLITUS (HCC): ICD-10-CM

## 2019-01-14 DIAGNOSIS — K22.70 BARRETT'S ESOPHAGUS WITHOUT DYSPLASIA: ICD-10-CM

## 2019-01-14 DIAGNOSIS — I10 ESSENTIAL HYPERTENSION: ICD-10-CM

## 2019-01-14 DIAGNOSIS — Z23 ENCOUNTER FOR VACCINATION: ICD-10-CM

## 2019-01-14 DIAGNOSIS — E78.2 MIXED HYPERLIPIDEMIA: ICD-10-CM

## 2019-01-14 DIAGNOSIS — E11.41 TYPE 2 DIABETES MELLITUS WITH DIABETIC MONONEUROPATHY, WITHOUT LONG-TERM CURRENT USE OF INSULIN (HCC): Primary | ICD-10-CM

## 2019-01-14 DIAGNOSIS — I89.0 LYMPHEDEMA: ICD-10-CM

## 2019-01-14 DIAGNOSIS — E11.9 ENCOUNTER FOR DIABETIC FOOT EXAM (HCC): ICD-10-CM

## 2019-01-14 PROCEDURE — 99214 OFFICE O/P EST MOD 30 MIN: CPT | Performed by: INTERNAL MEDICINE

## 2019-01-14 PROCEDURE — 3075F SYST BP GE 130 - 139MM HG: CPT | Performed by: INTERNAL MEDICINE

## 2019-01-14 PROCEDURE — 3078F DIAST BP <80 MM HG: CPT | Performed by: INTERNAL MEDICINE

## 2019-01-14 PROCEDURE — 3066F NEPHROPATHY DOC TX: CPT | Performed by: INTERNAL MEDICINE

## 2019-01-14 PROCEDURE — 90732 PPSV23 VACC 2 YRS+ SUBQ/IM: CPT | Performed by: INTERNAL MEDICINE

## 2019-01-14 PROCEDURE — 4040F PNEUMOC VAC/ADMIN/RCVD: CPT | Performed by: INTERNAL MEDICINE

## 2019-01-14 PROCEDURE — G0009 ADMIN PNEUMOCOCCAL VACCINE: HCPCS | Performed by: INTERNAL MEDICINE

## 2019-01-14 PROCEDURE — 1160F RVW MEDS BY RX/DR IN RCRD: CPT | Performed by: INTERNAL MEDICINE

## 2019-01-14 PROCEDURE — 3008F BODY MASS INDEX DOCD: CPT | Performed by: INTERNAL MEDICINE

## 2019-01-14 PROCEDURE — 1036F TOBACCO NON-USER: CPT | Performed by: INTERNAL MEDICINE

## 2019-01-14 RX ORDER — GLIMEPIRIDE 4 MG/1
TABLET ORAL
Qty: 180 TABLET | Refills: 0
Start: 2019-01-14 | End: 2020-10-24 | Stop reason: SDUPTHER

## 2019-01-14 RX ORDER — OMEPRAZOLE 20 MG/1
20 CAPSULE, DELAYED RELEASE ORAL 2 TIMES DAILY
Refills: 0 | COMMUNITY
Start: 2018-12-21

## 2019-01-14 NOTE — PROGRESS NOTES
Assessment/Plan:    No problem-specific Assessment & Plan notes found for this encounter  Diagnoses and all orders for this visit:    Type 2 diabetes mellitus with diabetic mononeuropathy, without long-term current use of insulin (HCC)  -     glimepiride (AMARYL) 4 mg tablet; Half a tab po q AM and one full pill po q PM    Diabetic nephropathy associated with type 2 diabetes mellitus ([facility] )    Essential hypertension    Benign prostatic hyperplasia without lower urinary tract symptoms    Alcantar's esophagus without dysplasia    Mixed hyperlipidemia    Lymphedema    Vitamin B12 deficiency    Encounter for diabetic foot exam ([facility] )    Encounter for vaccination  -     PNEUMOCOCCAL POLYSACCHARIDE VACCINE 23-VALENT =>1YO SQ IM    Other orders  -     omeprazole (PriLOSEC) 20 mg delayed release capsule; Take 20 mg by mouth 2 (two) times a day      A/P: Doing ok, but sugars up  Will change the amaryl to 2mg in the AM and 4mg in the PM  Will call in several weeks for readings  Update his second pneumonia vaccine  Continue current treatment otherwise  RTC three months for routine  Subjective:      Patient ID: Fernando Marcum  is a 72 y o  male  WM RTC for f/u dm, htn, etc  Doing ok and no new issues, but recent labs shows elevated HgA1c  We decreased his amaryl from 8 to 4mg due to low sugars during the day  No change in diet  Remains active w/o difficulty and no falls  NO reflux reported  Lymphedema controlled as long as he uses his compression stockings  DJD/DDD pain controlled  Due for vaccines  Diabetes   He has type 2 diabetes mellitus  No MedicAlert identification noted  The initial diagnosis of diabetes was made 21 years ago  Hypoglycemia symptoms include confusion, hunger, mood changes, nervousness/anxiousness, sweats and tremors  Pertinent negatives for hypoglycemia include no dizziness, headaches, pallor, seizures, sleepiness or speech difficulty  Associated symptoms include foot paresthesias  Pertinent negatives for diabetes include no blurred vision, no chest pain, no fatigue, no foot ulcerations, no polydipsia, no polyphagia, no polyuria, no visual change, no weakness and no weight loss  Pertinent negatives for hypoglycemia complications include no blackouts, no hospitalization, no nocturnal hypoglycemia, no required assistance and no required glucagon injection  Symptoms are stable  Diabetic complications include impotence and peripheral neuropathy  Pertinent negatives for diabetic complications include no CVA, heart disease, nephropathy, PVD or retinopathy  Risk factors for coronary artery disease include obesity, sedentary lifestyle and stress  Current diabetic treatment includes diet and oral agent (dual therapy)  He is compliant with treatment most of the time  His weight is stable  He is following a generally healthy diet  Meal planning includes avoidance of concentrated sweets  He has had a previous visit with a dietitian  He participates in exercise weekly  He monitors blood glucose at home 3-4 x per day  Blood glucose monitoring compliance is excellent  His home blood glucose trend is increasing steadily  His breakfast blood glucose is taken between 8-9 am  His breakfast blood glucose range is generally 140-180 mg/dl  His lunch blood glucose is taken between 1-2 pm  His lunch blood glucose range is generally 140-180 mg/dl  His dinner blood glucose is taken between 7-8 pm  His dinner blood glucose range is generally 140-180 mg/dl  His highest blood glucose is 140-180 mg/dl  His overall blood glucose range is 130-140 mg/dl  He sees a podiatrist Eye exam is current  The following portions of the patient's history were reviewed and updated as appropriate:   He  has a past medical history of Arthritis; Chronic kidney disease; Diabetes mellitus ([address] ); Eustachian tube dysfunction; Gastroesophageal reflux disease without esophagitis (4/11/2018); Hypertension; Obesity; and Scrotal bleeding    He Patient Active Problem List    Diagnosis Date Noted    Spinal stenosis of lumbosacral region 10/12/2018    Lyme disease 07/11/2018    Alcantar's esophagus without dysplasia 07/11/2018    Gastroesophageal reflux disease without esophagitis 04/11/2018    Sleep disturbances 04/08/2016    Diabetes with neurologic complications ([facility] ) 67/44/6806    Lymphedema 04/17/2013    Hyperlipidemia 11/15/2012    Benign prostate hyperplasia 05/22/2012    Colon, diverticulosis 05/22/2012    Diabetic nephropathy ([facility] ) 05/22/2012    Erectile dysfunction of non-organic origin 05/22/2012    Hypertension 05/22/2012    Obesity 05/22/2012    Osteoarthritis 05/22/2012    Polyp of sigmoid colon 05/22/2012    Vitamin B12 deficiency 05/22/2012     He  has a past surgical history that includes Cholecystectomy; Tumor removal; Total knee arthroplasty (Left); Total knee arthroplasty (Right); and Umbilical hernia repair  His family history includes No Known Problems in his mother  He  reports that he has quit smoking  He has never used smokeless tobacco  He reports that he drinks alcohol  He reports that he does not use drugs    Current Outpatient Prescriptions   Medication Sig Dispense Refill    aspirin 81 mg chewable tablet Chew 81 mg daily      finasteride (PROSCAR) 5 mg tablet take 1 tablet by mouth once daily 90 tablet 3    glucose blood (FREESTYLE TEST STRIPS) test strip by In Vitro route      lisinopril (ZESTRIL) 10 mg tablet Take 1 tablet by mouth daily      metFORMIN (GLUCOPHAGE) 1000 MG tablet Take 1 Tablet in the AM, one half tablet at noon and 1 tablet in the evening 225 tablet 1    olopatadine (PATANOL) 0 1 % ophthalmic solution Apply 1 drop to eye 2 (two) times a day      omeprazole (PriLOSEC) 20 mg delayed release capsule Take 20 mg by mouth 2 (two) times a day  0    tamsulosin (FLOMAX) 0 4 mg TAKE 1 CAPSULE DAILY 90 capsule 3    glimepiride (AMARYL) 4 mg tablet Half a tab po q AM and one full pill po q PM 180 tablet 0     No current facility-administered medications for this visit  Current Outpatient Prescriptions on File Prior to Visit   Medication Sig    aspirin 81 mg chewable tablet Chew 81 mg daily    finasteride (PROSCAR) 5 mg tablet take 1 tablet by mouth once daily    glucose blood (FREESTYLE TEST STRIPS) test strip by In Vitro route    lisinopril (ZESTRIL) 10 mg tablet Take 1 tablet by mouth daily    metFORMIN (GLUCOPHAGE) 1000 MG tablet Take 1 Tablet in the AM, one half tablet at noon and 1 tablet in the evening    olopatadine (PATANOL) 0 1 % ophthalmic solution Apply 1 drop to eye 2 (two) times a day    tamsulosin (FLOMAX) 0 4 mg TAKE 1 CAPSULE DAILY    [DISCONTINUED] glimepiride (AMARYL) 4 mg tablet TAKE 1 TABLET TWICE A DAY    [DISCONTINUED] meloxicam (MOBIC) 15 mg tablet Take 1 tablet (15 mg total) by mouth daily     No current facility-administered medications on file prior to visit  He is allergic to sulfa antibiotics       Review of Systems   Constitutional: Negative for activity change, chills, diaphoresis, fatigue, fever and weight loss  HENT: Negative  Eyes: Negative for blurred vision and visual disturbance  Respiratory: Negative for cough, chest tightness, shortness of breath and wheezing  Cardiovascular: Negative for chest pain, palpitations and leg swelling  Gastrointestinal: Negative for abdominal pain, constipation, diarrhea, nausea and vomiting  Endocrine: Negative for cold intolerance, heat intolerance, polydipsia, polyphagia and polyuria  Genitourinary: Positive for impotence  Negative for difficulty urinating, dysuria and frequency  Musculoskeletal: Negative for arthralgias, gait problem and myalgias  Skin: Negative for pallor  Neurological: Positive for tremors  Negative for dizziness, seizures, speech difficulty, weakness, light-headedness and headaches  Psychiatric/Behavioral: Positive for confusion   Negative for dysphoric mood and sleep disturbance  The patient is nervous/anxious  Objective:      /70   Pulse 80   Temp 97 8 °F (36 6 °C) (Tympanic)   Resp 18   Ht 6' 4" (1 93 m)   Wt 124 kg (273 lb)   BMI 33 23 kg/m²          Physical Exam   Constitutional: He is oriented to person, place, and time  He appears well-developed and well-nourished  No distress  HENT:   Head: Normocephalic and atraumatic  Mouth/Throat: Oropharynx is clear and moist    Eyes: Pupils are equal, round, and reactive to light  Conjunctivae and EOM are normal    Neck: Neck supple  No JVD present  Cardiovascular: Normal rate, regular rhythm and normal heart sounds  Pulses are no weak pulses  No murmur heard  Pulses:       Dorsalis pedis pulses are 1+ on the right side, and 1+ on the left side  Posterior tibial pulses are 1+ on the right side, and 1+ on the left side  Pulmonary/Chest: Effort normal and breath sounds normal  No respiratory distress  He has no wheezes  He has no rales  Abdominal: Soft  Bowel sounds are normal  He exhibits no distension  There is no tenderness  Musculoskeletal: He exhibits edema (lymedema unchanged  )  He exhibits no tenderness  Feet:   Right Foot:   Skin Integrity: Negative for ulcer, skin breakdown, erythema, warmth, callus or dry skin  Left Foot:   Skin Integrity: Negative for ulcer, skin breakdown, erythema, warmth, callus or dry skin  Neurological: He is alert and oriented to person, place, and time  Psychiatric: He has a normal mood and affect  His behavior is normal  Judgment and thought content normal    Nursing note and vitals reviewed  Patient's shoes and socks removed  Right Foot/Ankle   Right Foot Inspection  Skin Exam: skin normal and skin intact no dry skin, no warmth, no callus, no erythema, no maceration, no abnormal color, no pre-ulcer, no ulcer and no callus                          Toe Exam: ROM and strength within normal limitsno swelling, no tenderness, erythema and  no right toe deformity  Sensory       Monofilament testing: diminished  Vascular  Capillary refills: < 3 seconds  The right DP pulse is 1+  The right PT pulse is 1+  Left Foot/Ankle  Left Foot Inspection  Skin Exam: skin normal and skin intactno dry skin, no warmth, no erythema, no maceration, normal color, no pre-ulcer, no ulcer and no callus                         Toe Exam: ROM and strength within normal limitsno swelling, no tenderness, no erythema and no left toe deformity                   Sensory       Monofilament: diminished  Vascular  Capillary refills: < 3 seconds  The left DP pulse is 1+  The left PT pulse is 1+  Assign Risk Category:  No deformity present; Loss of protective sensation;  No weak pulses       Risk: 1

## 2019-02-04 ENCOUNTER — TELEPHONE (OUTPATIENT)
Dept: INTERNAL MEDICINE CLINIC | Facility: CLINIC | Age: 74
End: 2019-02-04

## 2019-02-06 ENCOUNTER — TELEPHONE (OUTPATIENT)
Dept: INTERNAL MEDICINE CLINIC | Facility: CLINIC | Age: 74
End: 2019-02-06

## 2019-02-08 ENCOUNTER — TELEPHONE (OUTPATIENT)
Dept: INTERNAL MEDICINE CLINIC | Facility: CLINIC | Age: 74
End: 2019-02-08

## 2019-02-08 NOTE — TELEPHONE ENCOUNTER
Bridget Garay called in with his blood sugar readings    Machine gives an average-so far it has been 135    Am's running - 137 average    For the week it has been 135 average and as low at 130-highest 175

## 2019-03-11 DIAGNOSIS — E11.8 TYPE 2 DIABETES MELLITUS WITH COMPLICATION, UNSPECIFIED WHETHER LONG TERM INSULIN USE: ICD-10-CM

## 2019-03-25 DIAGNOSIS — I10 ESSENTIAL HYPERTENSION: Primary | ICD-10-CM

## 2019-03-25 PROCEDURE — 4010F ACE/ARB THERAPY RXD/TAKEN: CPT | Performed by: INTERNAL MEDICINE

## 2019-03-25 RX ORDER — LISINOPRIL 10 MG/1
10 TABLET ORAL DAILY
Qty: 90 TABLET | Refills: 1 | Status: SHIPPED | OUTPATIENT
Start: 2019-03-25 | End: 2019-10-09 | Stop reason: SDUPTHER

## 2019-04-15 ENCOUNTER — OFFICE VISIT (OUTPATIENT)
Dept: INTERNAL MEDICINE CLINIC | Facility: CLINIC | Age: 74
End: 2019-04-15
Payer: COMMERCIAL

## 2019-04-15 VITALS
HEART RATE: 87 BPM | HEIGHT: 76 IN | SYSTOLIC BLOOD PRESSURE: 120 MMHG | WEIGHT: 268 LBS | DIASTOLIC BLOOD PRESSURE: 70 MMHG | BODY MASS INDEX: 32.63 KG/M2 | TEMPERATURE: 98.1 F | OXYGEN SATURATION: 94 % | RESPIRATION RATE: 18 BRPM

## 2019-04-15 DIAGNOSIS — Z00.00 MEDICARE ANNUAL WELLNESS VISIT, INITIAL: ICD-10-CM

## 2019-04-15 DIAGNOSIS — E66.9 OBESITY (BMI 30.0-34.9): ICD-10-CM

## 2019-04-15 DIAGNOSIS — M15.9 PRIMARY OSTEOARTHRITIS INVOLVING MULTIPLE JOINTS: ICD-10-CM

## 2019-04-15 DIAGNOSIS — E78.2 MIXED HYPERLIPIDEMIA: ICD-10-CM

## 2019-04-15 DIAGNOSIS — E11.41 TYPE 2 DIABETES MELLITUS WITH DIABETIC MONONEUROPATHY, WITHOUT LONG-TERM CURRENT USE OF INSULIN (HCC): Primary | ICD-10-CM

## 2019-04-15 DIAGNOSIS — I10 ESSENTIAL HYPERTENSION: ICD-10-CM

## 2019-04-15 DIAGNOSIS — Z12.5 SCREENING FOR PROSTATE CANCER: ICD-10-CM

## 2019-04-15 DIAGNOSIS — K22.70 BARRETT'S ESOPHAGUS WITHOUT DYSPLASIA: ICD-10-CM

## 2019-04-15 PROCEDURE — 3008F BODY MASS INDEX DOCD: CPT | Performed by: INTERNAL MEDICINE

## 2019-04-15 PROCEDURE — 1036F TOBACCO NON-USER: CPT | Performed by: INTERNAL MEDICINE

## 2019-04-15 PROCEDURE — 3074F SYST BP LT 130 MM HG: CPT | Performed by: INTERNAL MEDICINE

## 2019-04-15 PROCEDURE — 99214 OFFICE O/P EST MOD 30 MIN: CPT | Performed by: INTERNAL MEDICINE

## 2019-04-15 PROCEDURE — G0438 PPPS, INITIAL VISIT: HCPCS | Performed by: INTERNAL MEDICINE

## 2019-04-15 PROCEDURE — 3078F DIAST BP <80 MM HG: CPT | Performed by: INTERNAL MEDICINE

## 2019-04-15 PROCEDURE — 1160F RVW MEDS BY RX/DR IN RCRD: CPT | Performed by: INTERNAL MEDICINE

## 2019-05-23 ENCOUNTER — APPOINTMENT (OUTPATIENT)
Dept: LAB | Facility: CLINIC | Age: 74
End: 2019-05-23
Payer: COMMERCIAL

## 2019-05-23 DIAGNOSIS — E78.2 MIXED HYPERLIPIDEMIA: ICD-10-CM

## 2019-05-23 DIAGNOSIS — Z12.5 SCREENING FOR PROSTATE CANCER: ICD-10-CM

## 2019-05-23 DIAGNOSIS — E11.41 TYPE 2 DIABETES MELLITUS WITH DIABETIC MONONEUROPATHY, WITHOUT LONG-TERM CURRENT USE OF INSULIN (HCC): ICD-10-CM

## 2019-05-23 LAB
ALBUMIN SERPL BCP-MCNC: 3.5 G/DL (ref 3.5–5)
ALP SERPL-CCNC: 69 U/L (ref 46–116)
ALT SERPL W P-5'-P-CCNC: 18 U/L (ref 12–78)
ANION GAP SERPL CALCULATED.3IONS-SCNC: 3 MMOL/L (ref 4–13)
AST SERPL W P-5'-P-CCNC: 8 U/L (ref 5–45)
BILIRUB SERPL-MCNC: 0.81 MG/DL (ref 0.2–1)
BUN SERPL-MCNC: 15 MG/DL (ref 5–25)
CALCIUM SERPL-MCNC: 8.7 MG/DL (ref 8.3–10.1)
CHLORIDE SERPL-SCNC: 108 MMOL/L (ref 100–108)
CO2 SERPL-SCNC: 29 MMOL/L (ref 21–32)
CREAT SERPL-MCNC: 1.06 MG/DL (ref 0.6–1.3)
EST. AVERAGE GLUCOSE BLD GHB EST-MCNC: 157 MG/DL
GFR SERPL CREATININE-BSD FRML MDRD: 70 ML/MIN/1.73SQ M
GLUCOSE P FAST SERPL-MCNC: 129 MG/DL (ref 65–99)
HBA1C MFR BLD: 7.1 % (ref 4.2–6.3)
LDLC SERPL DIRECT ASSAY-MCNC: 108 MG/DL (ref 0–100)
POTASSIUM SERPL-SCNC: 4.7 MMOL/L (ref 3.5–5.3)
PROT SERPL-MCNC: 6.6 G/DL (ref 6.4–8.2)
PSA SERPL-MCNC: 0.3 NG/ML (ref 0–4)
SODIUM SERPL-SCNC: 140 MMOL/L (ref 136–145)
TRIGL SERPL-MCNC: 123 MG/DL

## 2019-05-23 PROCEDURE — 83721 ASSAY OF BLOOD LIPOPROTEIN: CPT

## 2019-05-23 PROCEDURE — 36415 COLL VENOUS BLD VENIPUNCTURE: CPT

## 2019-05-23 PROCEDURE — G0103 PSA SCREENING: HCPCS

## 2019-05-23 PROCEDURE — 84478 ASSAY OF TRIGLYCERIDES: CPT

## 2019-05-23 PROCEDURE — 80053 COMPREHEN METABOLIC PANEL: CPT

## 2019-05-23 PROCEDURE — 83036 HEMOGLOBIN GLYCOSYLATED A1C: CPT

## 2019-07-15 ENCOUNTER — OFFICE VISIT (OUTPATIENT)
Dept: INTERNAL MEDICINE CLINIC | Facility: CLINIC | Age: 74
End: 2019-07-15
Payer: COMMERCIAL

## 2019-07-15 ENCOUNTER — APPOINTMENT (OUTPATIENT)
Dept: LAB | Facility: CLINIC | Age: 74
End: 2019-07-15
Payer: COMMERCIAL

## 2019-07-15 VITALS
BODY MASS INDEX: 31.9 KG/M2 | WEIGHT: 262 LBS | TEMPERATURE: 98.2 F | RESPIRATION RATE: 18 BRPM | HEART RATE: 78 BPM | DIASTOLIC BLOOD PRESSURE: 64 MMHG | SYSTOLIC BLOOD PRESSURE: 120 MMHG | HEIGHT: 76 IN

## 2019-07-15 DIAGNOSIS — E78.2 MIXED HYPERLIPIDEMIA: ICD-10-CM

## 2019-07-15 DIAGNOSIS — H04.123 DRY EYE SYNDROME OF BOTH EYES: Primary | ICD-10-CM

## 2019-07-15 DIAGNOSIS — Z23 ENCOUNTER FOR VACCINATION: ICD-10-CM

## 2019-07-15 DIAGNOSIS — E53.8 VITAMIN B12 DEFICIENCY: ICD-10-CM

## 2019-07-15 DIAGNOSIS — M15.9 PRIMARY OSTEOARTHRITIS INVOLVING MULTIPLE JOINTS: ICD-10-CM

## 2019-07-15 DIAGNOSIS — E11.41 TYPE 2 DIABETES MELLITUS WITH DIABETIC MONONEUROPATHY, WITHOUT LONG-TERM CURRENT USE OF INSULIN (HCC): ICD-10-CM

## 2019-07-15 DIAGNOSIS — N40.0 BENIGN PROSTATIC HYPERPLASIA WITHOUT LOWER URINARY TRACT SYMPTOMS: ICD-10-CM

## 2019-07-15 DIAGNOSIS — K22.70 BARRETT'S ESOPHAGUS WITHOUT DYSPLASIA: ICD-10-CM

## 2019-07-15 DIAGNOSIS — E11.21 DIABETIC NEPHROPATHY ASSOCIATED WITH TYPE 2 DIABETES MELLITUS (HCC): ICD-10-CM

## 2019-07-15 DIAGNOSIS — I10 ESSENTIAL HYPERTENSION: ICD-10-CM

## 2019-07-15 LAB
BASOPHILS # BLD AUTO: 0.06 THOUSANDS/ΜL (ref 0–0.1)
BASOPHILS NFR BLD AUTO: 1 % (ref 0–1)
CREAT UR-MCNC: 30.5 MG/DL
EOSINOPHIL # BLD AUTO: 0 THOUSAND/ΜL (ref 0–0.61)
EOSINOPHIL NFR BLD AUTO: 0 % (ref 0–6)
ERYTHROCYTE [DISTWIDTH] IN BLOOD BY AUTOMATED COUNT: 14.1 % (ref 11.6–15.1)
EST. AVERAGE GLUCOSE BLD GHB EST-MCNC: 146 MG/DL
FOLATE SERPL-MCNC: 9.1 NG/ML (ref 3.1–17.5)
HBA1C MFR BLD: 6.7 % (ref 4.2–6.3)
HCT VFR BLD AUTO: 41.7 % (ref 36.5–49.3)
HGB BLD-MCNC: 14 G/DL (ref 12–17)
IMM GRANULOCYTES # BLD AUTO: 0.02 THOUSAND/UL (ref 0–0.2)
IMM GRANULOCYTES NFR BLD AUTO: 0 % (ref 0–2)
LYMPHOCYTES # BLD AUTO: 2.21 THOUSANDS/ΜL (ref 0.6–4.47)
LYMPHOCYTES NFR BLD AUTO: 24 % (ref 14–44)
MCH RBC QN AUTO: 30.7 PG (ref 26.8–34.3)
MCHC RBC AUTO-ENTMCNC: 33.6 G/DL (ref 31.4–37.4)
MCV RBC AUTO: 91 FL (ref 82–98)
MICROALBUMIN UR-MCNC: <5 MG/L (ref 0–20)
MICROALBUMIN/CREAT 24H UR: <16 MG/G CREATININE (ref 0–30)
MONOCYTES # BLD AUTO: 0.66 THOUSAND/ΜL (ref 0.17–1.22)
MONOCYTES NFR BLD AUTO: 7 % (ref 4–12)
NEUTROPHILS # BLD AUTO: 6.28 THOUSANDS/ΜL (ref 1.85–7.62)
NEUTS SEG NFR BLD AUTO: 68 % (ref 43–75)
NRBC BLD AUTO-RTO: 0 /100 WBCS
PLATELET # BLD AUTO: 156 THOUSANDS/UL (ref 149–390)
PMV BLD AUTO: 9.6 FL (ref 8.9–12.7)
RBC # BLD AUTO: 4.56 MILLION/UL (ref 3.88–5.62)
TSH SERPL DL<=0.05 MIU/L-ACNC: 0.69 UIU/ML (ref 0.36–3.74)
VIT B12 SERPL-MCNC: 181 PG/ML (ref 100–900)
WBC # BLD AUTO: 9.23 THOUSAND/UL (ref 4.31–10.16)

## 2019-07-15 PROCEDURE — 36415 COLL VENOUS BLD VENIPUNCTURE: CPT

## 2019-07-15 PROCEDURE — 1160F RVW MEDS BY RX/DR IN RCRD: CPT | Performed by: INTERNAL MEDICINE

## 2019-07-15 PROCEDURE — 82570 ASSAY OF URINE CREATININE: CPT | Performed by: INTERNAL MEDICINE

## 2019-07-15 PROCEDURE — 3066F NEPHROPATHY DOC TX: CPT | Performed by: INTERNAL MEDICINE

## 2019-07-15 PROCEDURE — 82607 VITAMIN B-12: CPT

## 2019-07-15 PROCEDURE — 85025 COMPLETE CBC W/AUTO DIFF WBC: CPT

## 2019-07-15 PROCEDURE — 3008F BODY MASS INDEX DOCD: CPT | Performed by: INTERNAL MEDICINE

## 2019-07-15 PROCEDURE — 99214 OFFICE O/P EST MOD 30 MIN: CPT | Performed by: INTERNAL MEDICINE

## 2019-07-15 PROCEDURE — 1036F TOBACCO NON-USER: CPT | Performed by: INTERNAL MEDICINE

## 2019-07-15 PROCEDURE — 84443 ASSAY THYROID STIM HORMONE: CPT

## 2019-07-15 PROCEDURE — 83036 HEMOGLOBIN GLYCOSYLATED A1C: CPT

## 2019-07-15 PROCEDURE — 82746 ASSAY OF FOLIC ACID SERUM: CPT

## 2019-07-15 PROCEDURE — 3074F SYST BP LT 130 MM HG: CPT | Performed by: INTERNAL MEDICINE

## 2019-07-15 PROCEDURE — 82043 UR ALBUMIN QUANTITATIVE: CPT | Performed by: INTERNAL MEDICINE

## 2019-07-15 RX ORDER — OLOPATADINE HYDROCHLORIDE 1 MG/ML
1 SOLUTION/ DROPS OPHTHALMIC 2 TIMES DAILY
Qty: 5 ML | Refills: 5 | Status: SHIPPED | OUTPATIENT
Start: 2019-07-15 | End: 2020-01-06 | Stop reason: SDUPTHER

## 2019-07-15 NOTE — PROGRESS NOTES
Assessment/Plan:  Problem List Items Addressed This Visit        Digestive    Alcantar's esophagus without dysplasia       Endocrine    Diabetes with neurologic complications ([place] [address] )    Relevant Orders    CBC and differential    Hemoglobin A1C    TSH, 3rd generation with Free T4 reflex    Microalbumin / creatinine urine ratio    Ambulatory referral to Ophthalmology    Diabetic nephropathy ([place] [address] )       Cardiovascular and Mediastinum    Hypertension       Musculoskeletal and Integument    Osteoarthritis       Genitourinary    Benign prostate hyperplasia       Other    Hyperlipidemia    Relevant Orders    TSH, 3rd generation with Free T4 reflex    Vitamin B12 deficiency    Relevant Orders    Folate    Vitamin B12      Other Visit Diagnoses     Dry eye syndrome of both eyes    -  Primary    Relevant Medications    olopatadine (PATANOL) 0 1 % ophthalmic solution    Encounter for vaccination        Relevant Medications    Zoster Vac Recomb Adjuvanted 50 MCG/0 5ML SUSR           Diagnoses and all orders for this visit:    Dry eye syndrome of both eyes  -     olopatadine (PATANOL) 0 1 % ophthalmic solution; Apply 1 drop to eye 2 (two) times a day    Type 2 diabetes mellitus with diabetic mononeuropathy, without long-term current use of insulin ([facility])  -     CBC and differential; Future  -     Hemoglobin A1C; Future  -     TSH, 3rd generation with Free T4 reflex; Future  -     Microalbumin / creatinine urine ratio  -     Ambulatory referral to Ophthalmology; Future    Diabetic nephropathy associated with type 2 diabetes mellitus ([place] [address] )    Essential hypertension    Mixed hyperlipidemia  -     TSH, 3rd generation with Free T4 reflex; Future    Alcantar's esophagus without dysplasia    Primary osteoarthritis involving multiple joints    Benign prostatic hyperplasia without lower urinary tract symptoms    Vitamin B12 deficiency  -     Folate;  Future  -     Vitamin B12; Future    Encounter for vaccination  -     Zoster Vac Recomb Adjuvanted 50 MCG/0 5ML SUSR; Inject 1 Dose into a muscle once for 1 dose        No problem-specific Assessment & Plan notes found for this encounter  A/P: Doing well and will check labs  Discussed the shingles vaccine and will refer to the pharmacy  Continue current treatment and RTC three months for routine  Subjective:      Patient ID: Heidy Jones  is a 69 y o  male  WM RTC for f/u dm, Alcantar's, etc  Doing well and no new issues  Remains active w/o difficulty and no falls  Sugars less than 140 mainly and no low sugars  Alcantar's is good as long as he is on his meds  No urinary s/s  DJD/DDD pain is controlled  Due for labs and vaccines  The following portions of the patient's history were reviewed and updated as appropriate:   He has a past medical history of Arthritis, Chronic kidney disease, Diabetes mellitus ([facility] ), Eustachian tube dysfunction, Gastroesophageal reflux disease without esophagitis (4/11/2018), Hypertension, Obesity, and Scrotal bleeding  ,  does not have any pertinent problems on file  ,   has a past surgical history that includes Cholecystectomy; Tumor removal; Total knee arthroplasty (Left); Total knee arthroplasty (Right); and Umbilical hernia repair  ,  family history includes No Known Problems in his mother  ,   reports that he has quit smoking  He has never used smokeless tobacco  He reports that he drinks alcohol  He reports that he does not use drugs  ,  is allergic to sulfa antibiotics     Current Outpatient Medications   Medication Sig Dispense Refill    aspirin 81 mg chewable tablet Chew 81 mg daily      finasteride (PROSCAR) 5 mg tablet take 1 tablet by mouth once daily 90 tablet 3    glimepiride (AMARYL) 4 mg tablet Half a tab po q AM and one full pill po q  tablet 0    glucose blood (FREESTYLE TEST STRIPS) test strip by In Vitro route      lisinopril (ZESTRIL) 10 mg tablet Take 1 tablet (10 mg total) by mouth daily (Patient taking differently: Take 5 mg by mouth daily ) 90 tablet 1    metFORMIN (GLUCOPHAGE) 1000 MG tablet TAKE 1 TABLET IN THE       MORNING, 1/2 TABLET AT     NOON, AND 1 TABLET IN THE  EVENING 225 tablet 1    olopatadine (PATANOL) 0 1 % ophthalmic solution Apply 1 drop to eye 2 (two) times a day 5 mL 5    omeprazole (PriLOSEC) 20 mg delayed release capsule Take 20 mg by mouth 2 (two) times a day  0    tamsulosin (FLOMAX) 0 4 mg TAKE 1 CAPSULE DAILY 90 capsule 3    Zoster Vac Recomb Adjuvanted 50 MCG/0 5ML SUSR Inject 1 Dose into a muscle once for 1 dose 1 each 1     No current facility-administered medications for this visit  Review of Systems   Constitutional: Negative for activity change, chills, diaphoresis, fatigue and fever  HENT: Negative  Eyes: Negative for visual disturbance  Respiratory: Negative for cough, chest tightness, shortness of breath and wheezing  Cardiovascular: Positive for leg swelling  Negative for chest pain and palpitations  Gastrointestinal: Negative for abdominal pain, constipation, diarrhea, nausea and vomiting  Endocrine: Negative for cold intolerance and heat intolerance  Genitourinary: Negative for difficulty urinating, dysuria and frequency  Musculoskeletal: Negative for arthralgias, gait problem and myalgias  Neurological: Negative for dizziness, seizures, syncope, weakness, light-headedness and headaches  Psychiatric/Behavioral: Negative for confusion, dysphoric mood and sleep disturbance  The patient is not nervous/anxious  Objective:  Vitals:    07/15/19 1258   BP: 120/64   Pulse: 78   Resp: 18   Temp: 98 2 °F (36 8 °C)   TempSrc: Tympanic   Weight: 119 kg (262 lb)   Height: 6' 4" (1 93 m)     Body mass index is 31 89 kg/m²  Physical Exam   Constitutional: He is oriented to person, place, and time  He appears well-developed and well-nourished  No distress  HENT:   Head: Normocephalic and atraumatic     Mouth/Throat: Oropharynx is clear and moist    Eyes: Pupils are equal, round, and reactive to light  Conjunctivae and EOM are normal    Neck: Neck supple  No JVD present  Cardiovascular: Normal rate, regular rhythm and normal heart sounds  Pulmonary/Chest: Effort normal and breath sounds normal  No respiratory distress  He has no wheezes  He has no rales  Abdominal: Soft  Bowel sounds are normal  He exhibits no distension  There is no tenderness  Musculoskeletal: He exhibits edema (trace with compression stockings in place  )  Neurological: He is alert and oriented to person, place, and time  Psychiatric: He has a normal mood and affect  His behavior is normal  Judgment and thought content normal    Nursing note and vitals reviewed

## 2019-07-15 NOTE — PATIENT INSTRUCTIONS
Type 2 Diabetes in Adults   WHAT YOU NEED TO KNOW:   What is type 2 diabetes? Type 2 diabetes is a disease that affects how your body uses glucose (sugar)  Normally, when the blood sugar level increases, the pancreas makes more insulin  Insulin helps move sugar out of the blood so it can be used for energy  Type 2 diabetes develops because either the body cannot make enough insulin, or it cannot use the insulin correctly  After many years, your pancreas may stop making insulin  What increases my risk for type 2 diabetes? · Obesity    · Physical inactivity    · Older age    · High blood pressure or high cholesterol    · A history of heart disease, gestational diabetes, or polycystic ovary syndrome     · A family member with diabetes    · Being [place], , , [place], or [facility]  What are the signs and symptoms of type 2 diabetes? You may have high blood sugar levels for a long time before symptoms appear  You may have any of the following:  · More hunger or thirst than usual     · Frequent urination     · Weight loss without trying     · Blurred vision  How is type 2 diabetes diagnosed? You may need tests to check for type 2 diabetes starting at age 39  You may need any of the following:  · An A1c test  shows the average amount of sugar in your blood over the past 2 to 3 months  Your healthcare provider will tell you the A1c level that is right for you  The goal for your A1c is usually below 7%  Your provider can help you make changes if a check shows the A1c is too high  · A fasting plasma glucose test  is when your blood sugar level is tested after you have not eaten for 8 hours  · A 2-hour plasma glucose test  starts with a blood sugar level check after you have not eaten for 8 hours  You are then given a glucose drink  Your blood sugar level is checked after 2 hours       · A random glucose test  may be done any time of day, no matter how long ago you ate   How is type 2 diabetes treated? Type 2 diabetes can be controlled to prevent damage to your heart, blood vessels, and other organs  The goal is to keep your blood sugar at a normal level  You must eat the right foods, and exercise regularly  You may need 1 or more hypoglycemic medicines or insulin if you cannot control your blood sugar level with nutrition and exercise  You may also need medicine to lower your risk for heart disease  An example includes medicine to lower or control your cholesterol  How do I check my blood sugar level? You will be taught how to check a small drop of blood in a glucose monitor  You will need to check your blood sugar level at least 3 times each day if you are on insulin  Ask your healthcare provider when and how often to check during the day  If you check your blood sugar level before a meal , it should be between 80 and 130 mg/dL  If you check your blood sugar level 1 to 2 hours after a meal , it should be less than 180 mg/dL  Ask your healthcare provider if these are good goals for you  Write down your results, and show them to your healthcare provider  Your provider may use the results to make changes to your medicine, food, and exercise schedules  What should I do if my blood sugar level is too low? Your blood sugar level is too low if it goes below 70 mg/dL  If the level is too low, eat or drink 15 grams of fast-acting carbohydrate  These are found naturally in fruits  Fast-acting carbohydrates will raise your blood sugar level quickly  Examples of 15 grams of fast-acting carbohydrate are 4 ounces (½ cup) of fruit juice or 4 ounces of regular soda  Other examples are 2 tablespoons of raisins or 3 to 4 glucose tablets  Check your blood sugar level 15 minutes later  If the level is still low (less than 100 mg/dL), eat another 15 grams of carbohydrate  When the level returns to 100 mg/dL, eat a snack or meal that contains carbohydrates   This will help prevent another drop in blood sugar  Always carefully follow your healthcare provider's instructions on how to treat low blood sugar levels  What do I need to know about nutrition? A dietitian will help you make a meal plan to keep your blood sugar level steady  Do not skip meals  Your blood sugar level may drop too low if you have taken diabetes medicine and do not eat  · Keep track of carbohydrates (sugar and starchy foods)  Your blood sugar level can get too high if you eat too many carbohydrates  Eat fruits, legumes, vegetables, and whole grains  Your dietitian will help you plan meals and snacks that have the right amount of carbohydrates  · Eat low-fat foods , such as skinless chicken and low-fat milk  · Eat less sodium (salt)  Limit high-sodium foods, such as soy sauce, potato chips, and soup  Do not add salt to food you cook  Limit your use of table salt  You should have less than 2,300 mg of sodium per day  · Eat high-fiber foods , such as vegetables, whole-grain breads, and beans  · Limit alcohol  Alcohol affects your blood sugar level and can make it harder to manage your diabetes  Limit alcohol to 1 drink a day if you are a woman  Limit alcohol to 2 drinks a day if you are a man  A drink of alcohol is 12 ounces of beer, 5 ounces of wine, or 1½ ounces of liquor  How much exercise do I need? Exercise can help keep your blood sugar level steady, decrease your risk of heart disease, and help you lose weight  Stretch before and after you exercise  Exercise for at least 150 minutes every week  Spread this amount of exercise over at least 3 days a week  Do not skip exercise more than 2 days in a row  Include muscle strengthening activities 2 to 3 days each week  Older adults should include balance training 2 to 3 times each week  Activities that help increase balance include yoga and karin chi  Work with your healthcare provider to create an exercise plan    · Check your blood sugar level before and after exercise  Healthcare providers may tell you to change the amount of insulin you take or food you eat  If your blood sugar level is high, check your blood or urine for ketones before you exercise  Do not exercise if your blood sugar level is high and you have ketones  · If your blood sugar level is less than 100 mg/dL, have a carbohydrate snack before you exercise  Examples are 4 to 6 crackers, ½ banana, 8 ounces (1 cup) of milk, or 4 ounces (½ cup) of juice  Drink water or liquids that do not contain sugar before, during, and after exercise  Ask your dietitian or healthcare provider which liquids you should drink when you exercise  · Do not sit for longer than 30 minutes  If you cannot walk around, at least stand up  This will help you stay active and keep your blood circulating  What else can I do to manage type 2 diabetes? · Check your feet each day for sores  Wear shoes and socks that fit correctly  Do not trim your toenails  Ask your healthcare provider for more information about foot care  · Maintain a healthy weight  Ask your healthcare provider how much you should weigh  A healthy weight can help you control your diabetes and prevent heart disease  Ask your provider to help you create a weight loss plan if you are overweight  Together you can set manageable weight loss goals  · Do not smoke  Nicotine and other chemicals in cigarettes and cigars can cause lung damage and make it more difficult to manage your diabetes  Ask your healthcare provider for information if you currently smoke and need help to quit  Do not use e-cigarettes or smokeless tobacco in place of cigarettes or to help you quit  They still contain nicotine  · Check your blood pressure as directed  Ask your healthcare provider what your blood pressure should be  Most adults with diabetes and high blood pressure should have a systolic blood pressure (first number) less than 140   Your diastolic blood pressure (second number) should be less than 90  · Wear medical alert identification  Wear medical alert jewelry or carry a card that says you have diabetes  Ask your healthcare provider where to get these items  · Ask about vaccines  You have a higher risk for serious illness if you get the flu, pneumonia, or hepatitis  Ask your healthcare provider if you should get a flu, pneumonia, or hepatitis B vaccine, and when to get the vaccine  What are the risks of type 2 diabetes? Uncontrolled diabetes can damage your nerves, veins, and arteries  High blood sugar levels may damage other body tissue and organs over time  Damage to arteries may increase your risk for heart attack and stroke  Nerve damage may also lead to other heart, stomach, and nerve problems  Diabetes is life-threatening if it is not controlled  Control your blood glucose levels to prevent health problems  Call 911 for any of the following:   · You have any of the following signs of a stroke:      ¨ Numbness or drooping on one side of your face     ¨ Weakness in an arm or leg    ¨ Confusion or difficulty speaking    ¨ Dizziness, a severe headache, or vision loss    · You have any of the following signs of a heart attack:      ¨ Squeezing, pressure, or pain in your chest that lasts longer than 5 minutes or returns    ¨ Discomfort or pain in your back, neck, jaw, stomach, or arm     ¨ Trouble breathing    ¨ Nausea or vomiting    ¨ Lightheadedness or a sudden cold sweat, especially with chest pain or trouble breathing  When should I seek immediate care? · You have severe abdominal pain, or the pain spreads to your back  You may also be vomiting  · You have trouble staying awake or focusing  · You are shaking or sweating  · You have blurred or double vision  · Your breath has a fruity, sweet smell  · Your breathing is deep and labored, or rapid and shallow  · Your heartbeat is fast and weak    When should I contact my healthcare provider? · You are vomiting or have diarrhea  · You have an upset stomach and cannot eat the foods on your meal plan  · You feel weak or more tired than usual      · You feel dizzy, have headaches, or are easily irritated  · Your skin is red, warm, dry, or swollen  · You have a wound that does not heal      · You have numbness in your arms or legs  · You have trouble coping with your illness, or you feel anxious or depressed  · You have questions or concerns about your condition or care  CARE AGREEMENT:   You have the right to help plan your care  Learn about your health condition and how it may be treated  Discuss treatment options with your caregivers to decide what care you want to receive  You always have the right to refuse treatment  The above information is an  only  It is not intended as medical advice for individual conditions or treatments  Talk to your doctor, nurse or pharmacist before following any medical regimen to see if it is safe and effective for you  © 2017 [employer] Information is for End User's use only and may not be sold, redistributed or otherwise used for commercial purposes  All illustrations and images included in CareNotes® are the copyrighted property of [employer]  or [employer]

## 2019-10-09 DIAGNOSIS — I10 ESSENTIAL HYPERTENSION: ICD-10-CM

## 2019-10-09 RX ORDER — LISINOPRIL 10 MG/1
TABLET ORAL
Qty: 90 TABLET | Refills: 1 | Status: SHIPPED | OUTPATIENT
Start: 2019-10-09 | End: 2020-06-11

## 2019-10-17 ENCOUNTER — OFFICE VISIT (OUTPATIENT)
Dept: INTERNAL MEDICINE CLINIC | Facility: CLINIC | Age: 74
End: 2019-10-17
Payer: COMMERCIAL

## 2019-10-17 VITALS
BODY MASS INDEX: 32.27 KG/M2 | DIASTOLIC BLOOD PRESSURE: 60 MMHG | SYSTOLIC BLOOD PRESSURE: 128 MMHG | HEART RATE: 68 BPM | HEIGHT: 76 IN | TEMPERATURE: 97.9 F | RESPIRATION RATE: 16 BRPM | WEIGHT: 265 LBS

## 2019-10-17 DIAGNOSIS — E53.8 VITAMIN B12 DEFICIENCY: ICD-10-CM

## 2019-10-17 DIAGNOSIS — I89.0 LYMPHEDEMA: ICD-10-CM

## 2019-10-17 DIAGNOSIS — Z13.31 NEGATIVE DEPRESSION SCREENING: ICD-10-CM

## 2019-10-17 DIAGNOSIS — M33.00 JDMS (JUVENILE DERMATOMYOSITIS) (HCC): ICD-10-CM

## 2019-10-17 DIAGNOSIS — E78.2 MIXED HYPERLIPIDEMIA: ICD-10-CM

## 2019-10-17 DIAGNOSIS — E11.41 TYPE 2 DIABETES MELLITUS WITH DIABETIC MONONEUROPATHY, WITHOUT LONG-TERM CURRENT USE OF INSULIN (HCC): Primary | ICD-10-CM

## 2019-10-17 DIAGNOSIS — K22.70 BARRETT'S ESOPHAGUS WITHOUT DYSPLASIA: ICD-10-CM

## 2019-10-17 DIAGNOSIS — I10 ESSENTIAL HYPERTENSION: ICD-10-CM

## 2019-10-17 DIAGNOSIS — M15.9 PRIMARY OSTEOARTHRITIS INVOLVING MULTIPLE JOINTS: ICD-10-CM

## 2019-10-17 DIAGNOSIS — N40.0 BENIGN PROSTATIC HYPERPLASIA WITHOUT LOWER URINARY TRACT SYMPTOMS: ICD-10-CM

## 2019-10-17 LAB — SL AMB POCT HEMOGLOBIN AIC: 6.5 (ref ?–6.5)

## 2019-10-17 PROCEDURE — 83036 HEMOGLOBIN GLYCOSYLATED A1C: CPT | Performed by: INTERNAL MEDICINE

## 2019-10-17 PROCEDURE — 3008F BODY MASS INDEX DOCD: CPT | Performed by: INTERNAL MEDICINE

## 2019-10-17 PROCEDURE — 99214 OFFICE O/P EST MOD 30 MIN: CPT | Performed by: INTERNAL MEDICINE

## 2019-10-17 PROCEDURE — 3078F DIAST BP <80 MM HG: CPT | Performed by: INTERNAL MEDICINE

## 2019-10-17 PROCEDURE — 3074F SYST BP LT 130 MM HG: CPT | Performed by: INTERNAL MEDICINE

## 2019-10-17 RX ORDER — TAMSULOSIN HYDROCHLORIDE 0.4 MG/1
0.4 CAPSULE ORAL DAILY
Qty: 90 CAPSULE | Refills: 3 | Status: SHIPPED | OUTPATIENT
Start: 2019-10-17 | End: 2020-06-11

## 2019-10-17 NOTE — PROGRESS NOTES
Assessment/Plan:  Problem List Items Addressed This Visit        Digestive    Alcantar's esophagus without dysplasia       Endocrine    Diabetes with neurologic complications ([facility] [address] ) - Primary    Relevant Medications    metFORMIN (GLUCOPHAGE) 1000 MG tablet    Other Relevant Orders    POCT hemoglobin A1c (Completed)       Cardiovascular and Mediastinum    Hypertension       Musculoskeletal and Integument    Osteoarthritis       Genitourinary    Benign prostate hyperplasia    Relevant Medications    tamsulosin (FLOMAX) 0 4 mg       Other    Hyperlipidemia    Lymphedema    Vitamin B12 deficiency      Other Visit Diagnoses     Negative depression screening        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[facility])        Relevant Medications    metFORMIN (GLUCOPHAGE) 1000 MG tablet           Diagnoses and all orders for this visit:    Type 2 diabetes mellitus with diabetic mononeuropathy, without long-term current use of insulin ([facility])  -     POCT hemoglobin A1c    Alcantar's esophagus without dysplasia    Essential hypertension    Primary osteoarthritis involving multiple joints    Benign prostatic hyperplasia without lower urinary tract symptoms  -     tamsulosin (FLOMAX) 0 4 mg; Take 1 capsule (0 4 mg total) by mouth daily    Mixed hyperlipidemia    Lymphedema    Vitamin B12 deficiency    Negative depression screening    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[facility])  -     metFORMIN (GLUCOPHAGE) 1000 MG tablet; TAKE 1 TABLET IN THE       MORNING, 1/2 TABLET AT     NOON, AND 1 TABLET IN THE  EVENING        No problem-specific Assessment & Plan notes found for this encounter  A/P: Doing well and in house HgA1c was 6 5  Discussed vaccines  Will continue current treatment and RTC three months for routine  Subjective:      Patient ID: Joseline Walsh  is a 69 y o  male  WM RTC for f/u dm, htn, etc  Doing well and no new issues  Remains active w/o difficulty and no falls  Sugars less than 140 and no low sugars  No GERD  No urinary s/s  Lymphedema is good  Due for labs  The following portions of the patient's history were reviewed and updated as appropriate:   He has a past medical history of Arthritis, Chronic kidney disease, Diabetes mellitus ([address] ), Eustachian tube dysfunction, Gastroesophageal reflux disease without esophagitis (4/11/2018), Hypertension, Obesity, and Scrotal bleeding  ,  does not have any pertinent problems on file  ,   has a past surgical history that includes Cholecystectomy; Tumor removal; Total knee arthroplasty (Left); Total knee arthroplasty (Right); and Umbilical hernia repair  ,  family history includes No Known Problems in his mother  ,   reports that he has quit smoking  He has never used smokeless tobacco  He reports that he drinks alcohol  He reports that he does not use drugs  ,  is allergic to sulfa antibiotics     Current Outpatient Medications   Medication Sig Dispense Refill    finasteride (PROSCAR) 5 mg tablet take 1 tablet by mouth once daily 90 tablet 3    glimepiride (AMARYL) 4 mg tablet Half a tab po q AM and one full pill po q  tablet 0    glucose blood (FREESTYLE TEST STRIPS) test strip by In Vitro route      lisinopril (ZESTRIL) 10 mg tablet TAKE 1 TABLET DAILY 90 tablet 1    metFORMIN (GLUCOPHAGE) 1000 MG tablet TAKE 1 TABLET IN THE       MORNING, 1/2 TABLET AT     NOON, AND 1 TABLET IN THE  EVENING 225 tablet 3    olopatadine (PATANOL) 0 1 % ophthalmic solution Apply 1 drop to eye 2 (two) times a day 5 mL 5    omeprazole (PriLOSEC) 20 mg delayed release capsule Take 20 mg by mouth 2 (two) times a day  0    tamsulosin (FLOMAX) 0 4 mg Take 1 capsule (0 4 mg total) by mouth daily 90 capsule 3     No current facility-administered medications for this visit  Review of Systems   Constitutional: Negative for activity change, chills, diaphoresis, fatigue and fever  HENT: Negative  Eyes: Negative for visual disturbance     Respiratory: Negative for cough, chest tightness, shortness of breath and wheezing  Cardiovascular: Negative for chest pain, palpitations and leg swelling  Gastrointestinal: Negative for abdominal pain, constipation, diarrhea, nausea and vomiting  Endocrine: Negative for cold intolerance and heat intolerance  Genitourinary: Negative for difficulty urinating, dysuria and frequency  Musculoskeletal: Negative for arthralgias, gait problem and myalgias  Neurological: Negative for dizziness, seizures, syncope, weakness, light-headedness and headaches  Psychiatric/Behavioral: Negative for confusion, dysphoric mood and sleep disturbance  The patient is not nervous/anxious  PHQ-9 Depression Screening    PHQ-9:    Frequency of the following problems over the past two weeks:       Little interest or pleasure in doing things:  0 - not at all  Feeling down, depressed, or hopeless:  0 - not at all  PHQ-2 Score:  0        Objective:  Vitals:    10/17/19 1256   BP: 128/60   Pulse: 68   Resp: 16   Temp: 97 9 °F (36 6 °C)   TempSrc: Tympanic   Weight: 120 kg (265 lb)   Height: 6' 4" (1 93 m)     Body mass index is 32 26 kg/m²  Physical Exam   Constitutional: He is oriented to person, place, and time  He appears well-developed and well-nourished  No distress  HENT:   Head: Normocephalic and atraumatic  Mouth/Throat: Oropharynx is clear and moist    Eyes: Pupils are equal, round, and reactive to light  Conjunctivae and EOM are normal    Neck: Neck supple  No JVD present  Cardiovascular: Normal rate, regular rhythm and normal heart sounds  Pulmonary/Chest: Effort normal and breath sounds normal  No respiratory distress  He has no wheezes  He has no rales  Abdominal: Soft  Bowel sounds are normal  He exhibits no distension  There is no tenderness  Musculoskeletal: He exhibits no edema  Neurological: He is alert and oriented to person, place, and time  Psychiatric: He has a normal mood and affect   His behavior is normal  Judgment and thought content normal  Nursing note and vitals reviewed

## 2019-10-17 NOTE — PATIENT INSTRUCTIONS
Type 2 Diabetes in Adults   AMBULATORY CARE:   Type 2 diabetes  is a disease that affects how your body uses glucose (sugar)  Normally, when the blood sugar level increases, the pancreas makes more insulin  Insulin helps move sugar out of the blood so it can be used for energy  Type 2 diabetes develops because either the body cannot make enough insulin, or it cannot use the insulin correctly  After many years, your pancreas may stop making insulin  Common symptoms include the following:   · More hunger or thirst than usual     · Frequent urination     · Weight loss without trying     · Blurred vision  Call 911 if you have any of the following:   · You have any of the following signs of a stroke:      ¨ Numbness or drooping on one side of your face     ¨ Weakness in an arm or leg    ¨ Confusion or difficulty speaking    ¨ Dizziness, a severe headache, or vision loss    · You have any of the following signs of a heart attack:      ¨ Squeezing, pressure, or pain in your chest that lasts longer than 5 minutes or returns    ¨ Discomfort or pain in your back, neck, jaw, stomach, or arm     ¨ Trouble breathing    ¨ Nausea or vomiting    ¨ Lightheadedness or a sudden cold sweat, especially with chest pain or trouble breathing  Seek care immediately if:   · You have severe abdominal pain, or the pain spreads to your back  You may also be vomiting  · You have trouble staying awake or focusing  · You are shaking or sweating  · You have blurred or double vision  · Your breath has a fruity, sweet smell  · Your breathing is deep and labored, or rapid and shallow  · Your heartbeat is fast and weak  Contact your healthcare provider if:   · You are vomiting or have diarrhea  · You have an upset stomach and cannot eat the foods on your meal plan  · You feel weak or more tired than usual      · You feel dizzy, have headaches, or are easily irritated  · Your skin is red, warm, dry, or swollen  · You have a wound that does not heal      · You have numbness in your arms or legs  · You have trouble coping with your illness, or you feel anxious or depressed  · You have questions or concerns about your condition or care  Treatment for type 2 diabetes  includes keeping your blood sugar at a normal level  You must eat the right foods, and exercise regularly  You may need medicine if you cannot control your blood sugar level with nutrition and exercise  You may also need medicine to prevent heart disease, a complication of type 2 diabetes  You may  need any of the following:  · Hypoglycemic medicines or insulin  may be given to decrease the amount of sugar in your blood  · Blood pressure medicine  may be given to lower your blood pressure  Your blood pressure should be less than 140/90  · Cholesterol lowering medicine  may be given to prevent heart disease  · Antiplatelets , such as aspirin, help prevent blood clots  Take your antiplatelet medicine exactly as directed  These medicines make it more likely for you to bleed or bruise  If you are told to take aspirin, do not take acetaminophen or ibuprofen instead  · Take your medicine as directed  Contact your healthcare provider if you think your medicine is not helping or if you have side effects  Tell him or her if you are allergic to any medicine  Keep a list of the medicines, vitamins, and herbs you take  Include the amounts, and when and why you take them  Bring the list or the pill bottles to follow-up visits  Carry your medicine list with you in case of an emergency  Check your blood sugar level: You will be taught how to check a small drop of blood in a glucose monitor  You will need to check your blood sugar level at least 3 times each day if you are on insulin  Ask your healthcare provider when and how often to check during the day  If you check your blood sugar level before a meal , it should be between 80 and 130 mg/dL   If you check your blood sugar level 1 to 2 hours after a meal , it should be less than 180 mg/dL  Ask your healthcare provider if these are good goals for you  Write down your results, and show them to your healthcare provider  He may use the results to make changes to your medicine, food, and exercise schedules  If your blood sugar level is too low: Your blood sugar level is too low if it goes below 70 mg/dL  If the level is too low, eat or drink 15 grams of fast-acting carbohydrate  These are found naturally in fruits  Fast-acting carbohydrates will raise your blood sugar level quickly  Examples of 15 grams of fast-acting carbohydrate are 4 ounces (½ cup) of fruit juice or 4 ounces of regular soda  Other examples are 2 tablespoons of raisins or 3 to 4 glucose tablets  Check your blood sugar level 15 minutes later  If the level is still low (less than 100 mg/dL), eat another 15 grams of carbohydrate  When the level returns to 100 mg/dL, eat a snack or meal that contains carbohydrates  This will help prevent another drop in blood sugar  Always carefully follow your healthcare provider's instructions on how to treat low blood sugar levels  Check your feet each day for sores:  Wear shoes and socks that fit correctly  Do not trim your toenails  Ask your healthcare provider for more information about foot care  Follow your meal plan:  A dietitian will help you make a meal plan to keep your blood sugar level steady  Do not skip meals  Your blood sugar level may drop too low if you have taken diabetes medicine and do not eat  · Keep track of carbohydrates (sugar and starchy foods)  Your blood sugar level can get too high if you eat too many carbohydrates  Your dietitian will help you plan meals and snacks that have the right amount of carbohydrates  · Eat low-fat foods , such as skinless chicken and low-fat milk  · Eat less sodium (salt)    Limit high-sodium foods, such as soy sauce, potato chips, and soup  Do not add salt to food you cook  Limit your use of table salt  You should have less than 2,300 mg of sodium per day  · Eat high-fiber foods , such as vegetables, whole grain breads, and beans  · Limit alcohol  Alcohol affects your blood sugar level and can make it harder to manage your diabetes  Limit alcohol to 1 drink a day if you are a woman  Limit alcohol to 2 drinks a day if you are a man  A drink of alcohol is 12 ounces of beer, 5 ounces of wine, or 1½ ounces of liquor  Maintain a healthy weight:  Ask your healthcare provider how much you should weigh  A healthy weight can help you control your diabetes  Ask your provider to help you create a weight loss plan if you are overweight  Together you can set manageable weight loss goals  Exercise as directed:  Exercise can help keep your blood sugar level steady, decrease your risk of heart disease, and help you lose weight  Stretch before and after you exercise  Exercise for at least 150 minutes every week  Spread this amount of exercise over at least 3 days a week  Do not skip exercise more than 2 days in a row  Include muscle strengthening activities 2 to 3 days each week  Older adults should include balance training 2 to 3 times each week  Activities that help increase balance include yoga and karin chi  Work with your healthcare provider to create an exercise plan  · Check your blood sugar level before and after exercise  Healthcare providers may tell you to change the amount of insulin you take or food you eat  If your blood sugar level is high, check your blood or urine for ketones before you exercise  Do not exercise if your blood sugar level is high and you have ketones  · If your blood sugar level is less than 100 mg/dL, have a carbohydrate snack before you exercise  Examples are 4 to 6 crackers, ½ banana, 8 ounces (1 cup) of milk, or 4 ounces (½ cup) of juice   Drink water or liquids that do not contain sugar before, during, and after exercise  Ask your dietitian or healthcare provider which liquids you should drink when you exercise  · Do not sit for longer than 30 minutes  If you cannot walk around, at least stand up  This will help you stay active and keep your blood circulating  Do not smoke:  Nicotine and other chemicals in cigarettes and cigars can cause lung damage and make it more difficult to manage your diabetes  Ask your healthcare provider for information if you currently smoke and need help to quit  Do not use e-cigarettes or smokeless tobacco in place of cigarettes or to help you quit  They still contain nicotine  Check your blood pressure as directed:  Ask your healthcare provider what your blood pressure should be  Most adults with diabetes and high blood pressure should have a systolic blood pressure (first number) less than 140  Your diastolic blood pressure (second number) should be less than 90  Wear medical alert identification:  Wear medical alert jewelry or carry a card that says you have diabetes  Ask your healthcare provider where to get these items  Ask about vaccines: You have a higher risk for serious illness if you get the flu, pneumonia, or hepatitis  Ask your healthcare provider if you should get a flu, pneumonia, or hepatitis B vaccine, and when to get the vaccine  Follow up with your healthcare provider as directed: You may need to return to have your A1c checked every 3 months  You will need to return at least once each year to have your feet checked  You will need an eye exam once a year to check for retinopathy  You will also need urine tests every year to check for kidney problems  You may need tests to monitor for heart disease such as an EKG, stress test, blood pressure monitoring, and blood tests  Write down your questions so you remember to ask them during your visits     © 2017 [facility] Information is for End User's use only and may not be sold, redistributed or otherwise used for commercial purposes  All illustrations and images included in CareNotes® are the copyrighted property of [employer]  or [employer]  The above information is an  only  It is not intended as medical advice for individual conditions or treatments  Talk to your doctor, nurse or pharmacist before following any medical regimen to see if it is safe and effective for you

## 2019-11-22 NOTE — RESULT NOTES
Verified Results  (1) HEP C ANTIBODY [id number] 12:16PM Hortensia Horvath     Test Name Result Flag Reference   HEPATITIS C ANTIBODY Non-reactive  Non-reactive 73 year old man with PMHx T2DM, stage IV glioblastoma multiforme (dx in 2018), HTN and PSHx appendectomy presents to the ED with 3 weeks of increased R hemiparesis, worsened aphasia and urinary incontinence, sent from his neruo-oncologist Dr. Cuba's office. Pt has had resection, chemo radiation, most recently was noted to be clinically worse in March 2019 with increased somnolence and steroids were increased. Per his neuro-onc, concern for progression of disease lessened given MRI results.  Patient's BP has been elevated and difficult to control.  Patient continues to be aphasic, possibly from partial/focal seizures emanating from tumor site of L temporal lobe vs. toxic metabolic encephalopathy.    Now with hypertensive urgency      Problem/Plan - 1:  ·  Problem: Hypertensive urgency.    -  Lisinopril 20 mg daily   - Cont with Norvasc.      Problem/Plan - 2:  ·  Problem: Glioblastoma multiforme.    - NSx follow up  - off Abx  - repeat S/S eval.      Problem/Plan - 3:  ·  Problem: Type 2 diabetes mellitus.    - RISS.      Problem/Plan - 4:  ·  Problem: Afib.    -  Now in sinus   - amiodarone 200 daily   - Hold ALL AV jennifer blockers for HR < 60  - No AC secondary to GBM and high risk of bleed.     seizure  - c/w keppra    dvt px  - lovenox    constipation  - miralax and senna

## 2019-12-20 DIAGNOSIS — E11.41 TYPE 2 DIABETES MELLITUS WITH DIABETIC MONONEUROPATHY, WITHOUT LONG-TERM CURRENT USE OF INSULIN (HCC): ICD-10-CM

## 2019-12-21 RX ORDER — GLIMEPIRIDE 4 MG/1
TABLET ORAL
Qty: 180 TABLET | Refills: 3 | Status: SHIPPED | OUTPATIENT
Start: 2019-12-21 | End: 2020-06-04

## 2019-12-27 DIAGNOSIS — N40.0 BENIGN PROSTATIC HYPERPLASIA WITHOUT LOWER URINARY TRACT SYMPTOMS: ICD-10-CM

## 2019-12-27 RX ORDER — FINASTERIDE 5 MG/1
TABLET, FILM COATED ORAL
Qty: 90 TABLET | Refills: 0 | Status: SHIPPED | OUTPATIENT
Start: 2019-12-27 | End: 2020-03-23 | Stop reason: SDUPTHER

## 2020-01-06 DIAGNOSIS — H04.123 DRY EYE SYNDROME OF BOTH EYES: ICD-10-CM

## 2020-01-06 RX ORDER — OLOPATADINE HYDROCHLORIDE 1 MG/ML
1 SOLUTION/ DROPS OPHTHALMIC 2 TIMES DAILY
Qty: 5 ML | Refills: 5 | Status: SHIPPED | OUTPATIENT
Start: 2020-01-06 | End: 2021-05-26 | Stop reason: SDUPTHER

## 2020-03-23 DIAGNOSIS — N40.0 BENIGN PROSTATIC HYPERPLASIA WITHOUT LOWER URINARY TRACT SYMPTOMS: ICD-10-CM

## 2020-03-23 RX ORDER — FINASTERIDE 5 MG/1
5 TABLET, FILM COATED ORAL DAILY
Qty: 90 TABLET | Refills: 1 | Status: SHIPPED | OUTPATIENT
Start: 2020-03-23 | End: 2020-09-21

## 2020-06-04 ENCOUNTER — OFFICE VISIT (OUTPATIENT)
Dept: INTERNAL MEDICINE CLINIC | Facility: CLINIC | Age: 75
End: 2020-06-04
Payer: COMMERCIAL

## 2020-06-04 VITALS
HEIGHT: 76 IN | DIASTOLIC BLOOD PRESSURE: 84 MMHG | OXYGEN SATURATION: 97 % | HEART RATE: 97 BPM | WEIGHT: 255 LBS | SYSTOLIC BLOOD PRESSURE: 136 MMHG | TEMPERATURE: 98.4 F | BODY MASS INDEX: 31.05 KG/M2

## 2020-06-04 DIAGNOSIS — E53.8 VITAMIN B12 DEFICIENCY: ICD-10-CM

## 2020-06-04 DIAGNOSIS — E78.2 MIXED HYPERLIPIDEMIA: ICD-10-CM

## 2020-06-04 DIAGNOSIS — K22.70 BARRETT'S ESOPHAGUS WITHOUT DYSPLASIA: ICD-10-CM

## 2020-06-04 DIAGNOSIS — M15.9 PRIMARY OSTEOARTHRITIS INVOLVING MULTIPLE JOINTS: ICD-10-CM

## 2020-06-04 DIAGNOSIS — E11.9 ENCOUNTER FOR DIABETIC FOOT EXAM (HCC): ICD-10-CM

## 2020-06-04 DIAGNOSIS — Z12.11 ENCOUNTER FOR SCREENING FOR COLORECTAL MALIGNANT NEOPLASM: ICD-10-CM

## 2020-06-04 DIAGNOSIS — Z12.5 SCREENING FOR PROSTATE CANCER: ICD-10-CM

## 2020-06-04 DIAGNOSIS — E11.41 TYPE 2 DIABETES MELLITUS WITH DIABETIC MONONEUROPATHY, WITHOUT LONG-TERM CURRENT USE OF INSULIN (HCC): Primary | ICD-10-CM

## 2020-06-04 DIAGNOSIS — Z00.00 MEDICARE ANNUAL WELLNESS VISIT, SUBSEQUENT: ICD-10-CM

## 2020-06-04 DIAGNOSIS — Z12.11 SCREEN FOR COLON CANCER: ICD-10-CM

## 2020-06-04 DIAGNOSIS — I89.0 LYMPHEDEMA: ICD-10-CM

## 2020-06-04 DIAGNOSIS — I10 ESSENTIAL HYPERTENSION: ICD-10-CM

## 2020-06-04 DIAGNOSIS — Z12.12 ENCOUNTER FOR SCREENING FOR COLORECTAL MALIGNANT NEOPLASM: ICD-10-CM

## 2020-06-04 DIAGNOSIS — N40.0 BENIGN PROSTATIC HYPERPLASIA WITHOUT LOWER URINARY TRACT SYMPTOMS: ICD-10-CM

## 2020-06-04 PROCEDURE — G0439 PPPS, SUBSEQ VISIT: HCPCS | Performed by: INTERNAL MEDICINE

## 2020-06-04 PROCEDURE — 1036F TOBACCO NON-USER: CPT | Performed by: INTERNAL MEDICINE

## 2020-06-04 PROCEDURE — 1160F RVW MEDS BY RX/DR IN RCRD: CPT | Performed by: INTERNAL MEDICINE

## 2020-06-04 PROCEDURE — 1101F PT FALLS ASSESS-DOCD LE1/YR: CPT | Performed by: INTERNAL MEDICINE

## 2020-06-04 PROCEDURE — 99214 OFFICE O/P EST MOD 30 MIN: CPT | Performed by: INTERNAL MEDICINE

## 2020-06-04 PROCEDURE — 1125F AMNT PAIN NOTED PAIN PRSNT: CPT | Performed by: INTERNAL MEDICINE

## 2020-06-04 PROCEDURE — 3008F BODY MASS INDEX DOCD: CPT | Performed by: INTERNAL MEDICINE

## 2020-06-04 PROCEDURE — 4040F PNEUMOC VAC/ADMIN/RCVD: CPT | Performed by: INTERNAL MEDICINE

## 2020-06-04 PROCEDURE — 3079F DIAST BP 80-89 MM HG: CPT | Performed by: INTERNAL MEDICINE

## 2020-06-04 PROCEDURE — 3075F SYST BP GE 130 - 139MM HG: CPT | Performed by: INTERNAL MEDICINE

## 2020-06-04 PROCEDURE — 3288F FALL RISK ASSESSMENT DOCD: CPT | Performed by: INTERNAL MEDICINE

## 2020-06-04 PROCEDURE — 3066F NEPHROPATHY DOC TX: CPT | Performed by: INTERNAL MEDICINE

## 2020-06-04 PROCEDURE — 1170F FXNL STATUS ASSESSED: CPT | Performed by: INTERNAL MEDICINE

## 2020-06-08 ENCOUNTER — APPOINTMENT (OUTPATIENT)
Dept: LAB | Facility: CLINIC | Age: 75
End: 2020-06-08
Payer: COMMERCIAL

## 2020-06-08 DIAGNOSIS — Z12.5 SCREENING FOR PROSTATE CANCER: ICD-10-CM

## 2020-06-08 DIAGNOSIS — E78.2 MIXED HYPERLIPIDEMIA: ICD-10-CM

## 2020-06-08 DIAGNOSIS — E11.41 TYPE 2 DIABETES MELLITUS WITH DIABETIC MONONEUROPATHY, WITHOUT LONG-TERM CURRENT USE OF INSULIN (HCC): ICD-10-CM

## 2020-06-08 LAB
ALBUMIN SERPL BCP-MCNC: 3.5 G/DL (ref 3.5–5)
ALP SERPL-CCNC: 72 U/L (ref 46–116)
ALT SERPL W P-5'-P-CCNC: 21 U/L (ref 12–78)
ANION GAP SERPL CALCULATED.3IONS-SCNC: 4 MMOL/L (ref 4–13)
AST SERPL W P-5'-P-CCNC: 8 U/L (ref 5–45)
BILIRUB SERPL-MCNC: 0.94 MG/DL (ref 0.2–1)
BUN SERPL-MCNC: 12 MG/DL (ref 5–25)
CALCIUM SERPL-MCNC: 9.3 MG/DL (ref 8.3–10.1)
CHLORIDE SERPL-SCNC: 106 MMOL/L (ref 100–108)
CHOLEST SERPL-MCNC: 172 MG/DL (ref 50–200)
CO2 SERPL-SCNC: 27 MMOL/L (ref 21–32)
CREAT SERPL-MCNC: 0.86 MG/DL (ref 0.6–1.3)
EST. AVERAGE GLUCOSE BLD GHB EST-MCNC: 143 MG/DL
GFR SERPL CREATININE-BSD FRML MDRD: 86 ML/MIN/1.73SQ M
GLUCOSE P FAST SERPL-MCNC: 143 MG/DL (ref 65–99)
HBA1C MFR BLD: 6.6 %
HDLC SERPL-MCNC: 39 MG/DL
LDLC SERPL CALC-MCNC: 104 MG/DL (ref 0–100)
POTASSIUM SERPL-SCNC: 4.3 MMOL/L (ref 3.5–5.3)
PROT SERPL-MCNC: 6.6 G/DL (ref 6.4–8.2)
PSA SERPL-MCNC: 0.4 NG/ML (ref 0–4)
SODIUM SERPL-SCNC: 137 MMOL/L (ref 136–145)
TRIGL SERPL-MCNC: 143 MG/DL

## 2020-06-08 PROCEDURE — G0103 PSA SCREENING: HCPCS

## 2020-06-08 PROCEDURE — 36415 COLL VENOUS BLD VENIPUNCTURE: CPT

## 2020-06-08 PROCEDURE — 83036 HEMOGLOBIN GLYCOSYLATED A1C: CPT

## 2020-06-08 PROCEDURE — 3044F HG A1C LEVEL LT 7.0%: CPT | Performed by: INTERNAL MEDICINE

## 2020-06-08 PROCEDURE — 80061 LIPID PANEL: CPT

## 2020-06-08 PROCEDURE — 80053 COMPREHEN METABOLIC PANEL: CPT

## 2020-06-11 DIAGNOSIS — N40.0 BENIGN PROSTATIC HYPERPLASIA WITHOUT LOWER URINARY TRACT SYMPTOMS: ICD-10-CM

## 2020-06-11 DIAGNOSIS — I10 ESSENTIAL HYPERTENSION: ICD-10-CM

## 2020-06-11 PROCEDURE — 4010F ACE/ARB THERAPY RXD/TAKEN: CPT | Performed by: INTERNAL MEDICINE

## 2020-06-11 RX ORDER — TAMSULOSIN HYDROCHLORIDE 0.4 MG/1
CAPSULE ORAL
Qty: 90 CAPSULE | Refills: 1 | Status: SHIPPED | OUTPATIENT
Start: 2020-06-11 | End: 2020-10-24 | Stop reason: SDUPTHER

## 2020-06-11 RX ORDER — LISINOPRIL 10 MG/1
TABLET ORAL
Qty: 90 TABLET | Refills: 1 | Status: SHIPPED | OUTPATIENT
Start: 2020-06-11 | End: 2020-10-26

## 2020-08-24 DIAGNOSIS — M33.00 JDMS (JUVENILE DERMATOMYOSITIS) (HCC): ICD-10-CM

## 2020-09-01 ENCOUNTER — DOCUMENTATION (OUTPATIENT)
Dept: INTERNAL MEDICINE CLINIC | Facility: CLINIC | Age: 75
End: 2020-09-01

## 2020-09-15 ENCOUNTER — OFFICE VISIT (OUTPATIENT)
Dept: INTERNAL MEDICINE CLINIC | Facility: CLINIC | Age: 75
End: 2020-09-15
Payer: COMMERCIAL

## 2020-09-15 VITALS
BODY MASS INDEX: 31.54 KG/M2 | WEIGHT: 259 LBS | HEART RATE: 83 BPM | RESPIRATION RATE: 18 BRPM | HEIGHT: 76 IN | SYSTOLIC BLOOD PRESSURE: 112 MMHG | OXYGEN SATURATION: 96 % | TEMPERATURE: 97.6 F | DIASTOLIC BLOOD PRESSURE: 68 MMHG

## 2020-09-15 DIAGNOSIS — K21.9 GASTROESOPHAGEAL REFLUX DISEASE WITHOUT ESOPHAGITIS: ICD-10-CM

## 2020-09-15 DIAGNOSIS — E11.41 TYPE 2 DIABETES MELLITUS WITH DIABETIC MONONEUROPATHY, WITHOUT LONG-TERM CURRENT USE OF INSULIN (HCC): Primary | ICD-10-CM

## 2020-09-15 DIAGNOSIS — N40.0 BENIGN PROSTATIC HYPERPLASIA WITHOUT LOWER URINARY TRACT SYMPTOMS: ICD-10-CM

## 2020-09-15 DIAGNOSIS — K22.70 BARRETT'S ESOPHAGUS WITHOUT DYSPLASIA: ICD-10-CM

## 2020-09-15 DIAGNOSIS — E53.8 VITAMIN B12 DEFICIENCY: ICD-10-CM

## 2020-09-15 DIAGNOSIS — I89.0 LYMPHEDEMA: ICD-10-CM

## 2020-09-15 DIAGNOSIS — E11.21 DIABETIC NEPHROPATHY ASSOCIATED WITH TYPE 2 DIABETES MELLITUS (HCC): ICD-10-CM

## 2020-09-15 DIAGNOSIS — E78.2 MIXED HYPERLIPIDEMIA: ICD-10-CM

## 2020-09-15 DIAGNOSIS — I10 ESSENTIAL HYPERTENSION: ICD-10-CM

## 2020-09-15 DIAGNOSIS — M15.9 PRIMARY OSTEOARTHRITIS INVOLVING MULTIPLE JOINTS: ICD-10-CM

## 2020-09-15 PROCEDURE — 3078F DIAST BP <80 MM HG: CPT | Performed by: INTERNAL MEDICINE

## 2020-09-15 PROCEDURE — 1036F TOBACCO NON-USER: CPT | Performed by: INTERNAL MEDICINE

## 2020-09-15 PROCEDURE — 3074F SYST BP LT 130 MM HG: CPT | Performed by: INTERNAL MEDICINE

## 2020-09-15 PROCEDURE — 3066F NEPHROPATHY DOC TX: CPT | Performed by: INTERNAL MEDICINE

## 2020-09-15 PROCEDURE — 99214 OFFICE O/P EST MOD 30 MIN: CPT | Performed by: INTERNAL MEDICINE

## 2020-09-15 PROCEDURE — 1160F RVW MEDS BY RX/DR IN RCRD: CPT | Performed by: INTERNAL MEDICINE

## 2020-09-15 RX ORDER — BLOOD SUGAR DIAGNOSTIC
1 STRIP MISCELLANEOUS 4 TIMES DAILY
Qty: 400 EACH | Refills: 1 | Status: SHIPPED | OUTPATIENT
Start: 2020-09-15

## 2020-09-15 NOTE — PROGRESS NOTES
Assessment/Plan:  Problem List Items Addressed This Visit        Digestive    Gastroesophageal reflux disease without esophagitis    Alcantar's esophagus without dysplasia    Relevant Orders    CBC and differential       Endocrine    Diabetes with neurologic complications ([facility] [address] ) - Primary    Relevant Medications    glucose blood (FREESTYLE TEST STRIPS) test strip    Other Relevant Orders    CBC and differential    Hemoglobin A1C    Microalbumin / creatinine urine ratio    Diabetic nephropathy (HCC)       Cardiovascular and Mediastinum    Hypertension       Musculoskeletal and Integument    Osteoarthritis       Genitourinary    Benign prostate hyperplasia       Other    Hyperlipidemia    Lymphedema    Vitamin B12 deficiency    Relevant Orders    Vitamin B12    Folate           Diagnoses and all orders for this visit:    Type 2 diabetes mellitus with diabetic mononeuropathy, without long-term current use of insulin (HCC)  -     CBC and differential; Future  -     Hemoglobin A1C; Future  -     Microalbumin / creatinine urine ratio  -     glucose blood (FREESTYLE TEST STRIPS) test strip; 1 each by Other route 4 (four) times a day    Alcantar's esophagus without dysplasia  -     CBC and differential; Future    Gastroesophageal reflux disease without esophagitis    Diabetic nephropathy associated with type 2 diabetes mellitus (HCC)    Essential hypertension    Primary osteoarthritis involving multiple joints    Benign prostatic hyperplasia without lower urinary tract symptoms    Lymphedema    Mixed hyperlipidemia    Vitamin B12 deficiency  -     Vitamin B12; Future  -     Folate; Future        No problem-specific Assessment & Plan notes found for this encounter  A/P: Doing well and will check labs  Discussed vaccines and had them at the pharm including the flu and shingles  Continue current treatment and RTC three months for routine  Subjective:      Patient ID: Omer Moody  is a 70 y o  male      WM RTC for f/u dm, Alcantar's, etc  Doing well and no new issues  Remains active w/o difficulty and no falls  Sugars less than 140 and no low sugar events  No reflux  Chronic pain is manageable  No urinary s/s  Due for labs and vaccines  The following portions of the patient's history were reviewed and updated as appropriate:   He has a past medical history of Arthritis, Chronic kidney disease, Diabetes mellitus ([address] ), Eustachian tube dysfunction, Gastroesophageal reflux disease without esophagitis (4/11/2018), Hypertension, Obesity, and Scrotal bleeding  ,  does not have any pertinent problems on file  ,   has a past surgical history that includes Cholecystectomy; Tumor removal; Total knee arthroplasty (Left); Total knee arthroplasty (Right); and Umbilical hernia repair  ,  family history includes No Known Problems in his mother  ,   reports that he has quit smoking  He has never used smokeless tobacco  He reports current alcohol use  He reports that he does not use drugs  ,  is allergic to sulfa antibiotics     Current Outpatient Medications   Medication Sig Dispense Refill    finasteride (PROSCAR) 5 mg tablet Take 1 tablet (5 mg total) by mouth daily 90 tablet 1    glimepiride (AMARYL) 4 mg tablet Half a tab po q AM and one full pill po q  tablet 0    glucose blood (FREESTYLE TEST STRIPS) test strip 1 each by Other route 4 (four) times a day 400 each 1    lisinopril (ZESTRIL) 10 mg tablet TAKE 1 TABLET DAILY 90 tablet 1    metFORMIN (GLUCOPHAGE) 1000 MG tablet TAKE 1 TABLET IN THE       MORNING, 1/2 TABLET AT     NOON, AND 1 TABLET IN THE  EVENING 225 tablet 3    olopatadine (PATANOL) 0 1 % ophthalmic solution Apply 1 drop to eye 2 (two) times a day 5 mL 5    omeprazole (PriLOSEC) 20 mg delayed release capsule Take 20 mg by mouth 2 (two) times a day  0    tamsulosin (FLOMAX) 0 4 mg TAKE 1 CAPSULE DAILY 90 capsule 1     No current facility-administered medications for this visit          Review of Systems Constitutional: Negative for activity change, chills, diaphoresis, fatigue and fever  HENT: Negative  Eyes: Negative for visual disturbance  Respiratory: Negative for cough, chest tightness, shortness of breath and wheezing  Cardiovascular: Negative for chest pain, palpitations and leg swelling  Gastrointestinal: Negative for abdominal pain, constipation, diarrhea, nausea and vomiting  Endocrine: Negative for cold intolerance and heat intolerance  Genitourinary: Negative for difficulty urinating, dysuria and frequency  Musculoskeletal: Negative for arthralgias, gait problem and myalgias  Neurological: Negative for dizziness, seizures, syncope, weakness, light-headedness and headaches  Psychiatric/Behavioral: Negative for confusion, dysphoric mood and sleep disturbance  The patient is not nervous/anxious  PHQ-9 Depression Screening    PHQ-9:    Frequency of the following problems over the past two weeks:             Objective:  Vitals:    09/15/20 1449   BP: 112/68   BP Location: Right arm   Patient Position: Sitting   Cuff Size: Adult   Pulse: 83   Resp: 18   Temp: 97 6 °F (36 4 °C)   TempSrc: Temporal   SpO2: 96%   Weight: 117 kg (259 lb)   Height: 6' 4" (1 93 m)     Body mass index is 31 53 kg/m²  Physical Exam  Vitals signs and nursing note reviewed  Constitutional:       General: He is not in acute distress  Appearance: Normal appearance  HENT:      Head: Normocephalic and atraumatic  Mouth/Throat:      Mouth: Mucous membranes are moist    Eyes:      Extraocular Movements: Extraocular movements intact  Conjunctiva/sclera: Conjunctivae normal       Pupils: Pupils are equal, round, and reactive to light  Neck:      Musculoskeletal: Neck supple  Vascular: No carotid bruit  Cardiovascular:      Rate and Rhythm: Normal rate and regular rhythm  Heart sounds: Normal heart sounds     Pulmonary:      Effort: Pulmonary effort is normal  No respiratory distress  Breath sounds: Normal breath sounds  No wheezing or rales  Abdominal:      General: Bowel sounds are normal  There is no distension  Palpations: Abdomen is soft  Tenderness: There is no abdominal tenderness  Musculoskeletal:      Right lower leg: No edema (wearing compression stockings bilat legs  )  Left lower leg: No edema  Neurological:      General: No focal deficit present  Mental Status: He is alert and oriented to person, place, and time  Mental status is at baseline  Psychiatric:         Mood and Affect: Mood normal          Behavior: Behavior normal          Thought Content:  Thought content normal          Judgment: Judgment normal

## 2020-09-15 NOTE — PATIENT INSTRUCTIONS
Type 2 Diabetes in Adults   WHAT YOU NEED TO KNOW:   What is type 2 diabetes? Type 2 diabetes is a disease that affects how your body uses glucose (sugar)  Normally, when the blood sugar level increases, the pancreas makes more insulin  Insulin helps move sugar out of the blood so it can be used for energy  Type 2 diabetes develops because either the body cannot make enough insulin, or it cannot use the insulin correctly  After many years, your pancreas may stop making insulin  What increases my risk for type 2 diabetes? · Obesity    · Physical inactivity    · Older age    · High blood pressure or high cholesterol    · A history of heart disease, gestational diabetes, or polycystic ovary syndrome     · A family member with diabetes    · Being [place] American, , , [place], or [facility]  What are the signs and symptoms of type 2 diabetes? You may have high blood sugar levels for a long time before symptoms appear  You may have any of the following:  · More hunger or thirst than usual     · Frequent urination     · Weight loss without trying     · Blurred vision  How is type 2 diabetes diagnosed? You may need tests to check for type 2 diabetes starting at age 39  You may need any of the following:  · An A1c test  shows the average amount of sugar in your blood over the past 2 to 3 months  Your healthcare provider will tell you the A1c level that is right for you  The goal for your A1c is usually below 7%  Your provider can help you make changes if a check shows the A1c is too high  · A fasting plasma glucose test  is when your blood sugar level is tested after you have not eaten for 8 hours  · A 2-hour plasma glucose test  starts with a blood sugar level check after you have not eaten for 8 hours  You are then given a glucose drink  Your blood sugar level is checked after 2 hours       · A random glucose test  may be done any time of day, no matter how long ago you ate   How is type 2 diabetes treated? Type 2 diabetes can be controlled to prevent damage to your heart, blood vessels, and other organs  The goal is to keep your blood sugar at a normal level  You must eat the right foods, and exercise regularly  You may need 1 or more hypoglycemic medicines or insulin if you cannot control your blood sugar level with nutrition and exercise  You may also need medicine to lower your risk for heart disease  An example includes medicine to lower or control your cholesterol  How do I check my blood sugar level? You will be taught how to check a small drop of blood in a glucose monitor  You will need to check your blood sugar level at least 3 times each day if you are on insulin  Ask your healthcare provider when and how often to check during the day  If you check your blood sugar level before a meal , it should be between 80 and 130 mg/dL  If you check your blood sugar level 1 to 2 hours after a meal , it should be less than 180 mg/dL  Ask your healthcare provider if these are good goals for you  Write down your results, and show them to your healthcare provider  Your provider may use the results to make changes to your medicine, food, and exercise schedules  What should I do if my blood sugar level is too low? Your blood sugar level is too low if it goes below 70 mg/dL  If the level is too low, eat or drink 15 grams of fast-acting carbohydrate  These are found naturally in fruits  Fast-acting carbohydrates will raise your blood sugar level quickly  Examples of 15 grams of fast-acting carbohydrate are 4 ounces (½ cup) of fruit juice or 4 ounces of regular soda  Other examples are 2 tablespoons of raisins or 3 to 4 glucose tablets  Check your blood sugar level 15 minutes later  If the level is still low (less than 100 mg/dL), eat another 15 grams of carbohydrate  When the level returns to 100 mg/dL, eat a snack or meal that contains carbohydrates   This will help prevent another drop in blood sugar  Always carefully follow your healthcare provider's instructions on how to treat low blood sugar levels  What do I need to know about nutrition? A dietitian will help you make a meal plan to keep your blood sugar level steady  Do not skip meals  Your blood sugar level may drop too low if you have taken diabetes medicine and do not eat  · Keep track of carbohydrates (sugar and starchy foods)  Your blood sugar level can get too high if you eat too many carbohydrates  Eat fruits, legumes, vegetables, and whole grains  Your dietitian will help you plan meals and snacks that have the right amount of carbohydrates  · Eat low-fat foods , such as skinless chicken and low-fat milk  · Eat less sodium (salt)  Limit high-sodium foods, such as soy sauce, potato chips, and soup  Do not add salt to food you cook  Limit your use of table salt  You should have less than 2,300 mg of sodium per day  · Eat high-fiber foods , such as vegetables, whole-grain breads, and beans  · Limit alcohol  Alcohol affects your blood sugar level and can make it harder to manage your diabetes  Limit alcohol to 1 drink a day if you are a woman  Limit alcohol to 2 drinks a day if you are a man  A drink of alcohol is 12 ounces of beer, 5 ounces of wine, or 1½ ounces of liquor  How much exercise do I need? Exercise can help keep your blood sugar level steady, decrease your risk of heart disease, and help you lose weight  Stretch before and after you exercise  Exercise for at least 150 minutes every week  Spread this amount of exercise over at least 3 days a week  Do not skip exercise more than 2 days in a row  Include muscle strengthening activities 2 to 3 days each week  Older adults should include balance training 2 to 3 times each week  Activities that help increase balance include yoga and karin chi  Work with your healthcare provider to create an exercise plan    · Check your blood sugar level before and after exercise  Healthcare providers may tell you to change the amount of insulin you take or food you eat  If your blood sugar level is high, check your blood or urine for ketones before you exercise  Do not exercise if your blood sugar level is high and you have ketones  · If your blood sugar level is less than 100 mg/dL, have a carbohydrate snack before you exercise  Examples are 4 to 6 crackers, ½ banana, 8 ounces (1 cup) of milk, or 4 ounces (½ cup) of juice  Drink water or liquids that do not contain sugar before, during, and after exercise  Ask your dietitian or healthcare provider which liquids you should drink when you exercise  · Do not sit for longer than 30 minutes  If you cannot walk around, at least stand up  This will help you stay active and keep your blood circulating  What else can I do to manage type 2 diabetes? · Check your feet each day for sores  Wear shoes and socks that fit correctly  Do not trim your toenails  Ask your healthcare provider for more information about foot care  · Maintain a healthy weight  Ask your healthcare provider how much you should weigh  A healthy weight can help you control your diabetes and prevent heart disease  Ask your provider to help you create a weight loss plan if you are overweight  Together you can set manageable weight loss goals  · Do not smoke  Nicotine and other chemicals in cigarettes and cigars can cause lung damage and make it more difficult to manage your diabetes  Ask your healthcare provider for information if you currently smoke and need help to quit  Do not use e-cigarettes or smokeless tobacco in place of cigarettes or to help you quit  They still contain nicotine  · Check your blood pressure as directed  Ask your healthcare provider what your blood pressure should be  Most adults with diabetes and high blood pressure should have a systolic blood pressure (first number) less than 140   Your diastolic blood pressure (second number) should be less than 90  · Wear medical alert identification  Wear medical alert jewelry or carry a card that says you have diabetes  Ask your healthcare provider where to get these items  · Ask about vaccines  You have a higher risk for serious illness if you get the flu, pneumonia, or hepatitis  Ask your healthcare provider if you should get a flu, pneumonia, or hepatitis B vaccine, and when to get the vaccine  What are the risks of type 2 diabetes? Uncontrolled diabetes can damage your nerves, veins, and arteries  High blood sugar levels may damage other body tissue and organs over time  Damage to arteries may increase your risk for heart attack and stroke  Nerve damage may also lead to other heart, stomach, and nerve problems  Diabetes is life-threatening if it is not controlled  Control your blood glucose levels to prevent health problems  Call 911 for any of the following:   · You have any of the following signs of a stroke:      ¨ Numbness or drooping on one side of your face     ¨ Weakness in an arm or leg    ¨ Confusion or difficulty speaking    ¨ Dizziness, a severe headache, or vision loss    · You have any of the following signs of a heart attack:      ¨ Squeezing, pressure, or pain in your chest that lasts longer than 5 minutes or returns    ¨ Discomfort or pain in your back, neck, jaw, stomach, or arm     ¨ Trouble breathing    ¨ Nausea or vomiting    ¨ Lightheadedness or a sudden cold sweat, especially with chest pain or trouble breathing  When should I seek immediate care? · You have severe abdominal pain, or the pain spreads to your back  You may also be vomiting  · You have trouble staying awake or focusing  · You are shaking or sweating  · You have blurred or double vision  · Your breath has a fruity, sweet smell  · Your breathing is deep and labored, or rapid and shallow  · Your heartbeat is fast and weak    When should I contact my healthcare provider? · You are vomiting or have diarrhea  · You have an upset stomach and cannot eat the foods on your meal plan  · You feel weak or more tired than usual      · You feel dizzy, have headaches, or are easily irritated  · Your skin is red, warm, dry, or swollen  · You have a wound that does not heal      · You have numbness in your arms or legs  · You have trouble coping with your illness, or you feel anxious or depressed  · You have questions or concerns about your condition or care  CARE AGREEMENT:   You have the right to help plan your care  Learn about your health condition and how it may be treated  Discuss treatment options with your caregivers to decide what care you want to receive  You always have the right to refuse treatment  The above information is an  only  It is not intended as medical advice for individual conditions or treatments  Talk to your doctor, nurse or pharmacist before following any medical regimen to see if it is safe and effective for you  © 2017 [employer] Information is for End User's use only and may not be sold, redistributed or otherwise used for commercial purposes  All illustrations and images included in CareNotes® are the copyrighted property of [employer]  or [employer]

## 2020-09-20 DIAGNOSIS — N40.0 BENIGN PROSTATIC HYPERPLASIA WITHOUT LOWER URINARY TRACT SYMPTOMS: ICD-10-CM

## 2020-09-21 RX ORDER — FINASTERIDE 5 MG/1
TABLET, FILM COATED ORAL
Qty: 90 TABLET | Refills: 1 | Status: SHIPPED | OUTPATIENT
Start: 2020-09-21 | End: 2021-01-19

## 2020-10-05 ENCOUNTER — TELEPHONE (OUTPATIENT)
Dept: INTERNAL MEDICINE CLINIC | Facility: CLINIC | Age: 75
End: 2020-10-05

## 2020-10-05 DIAGNOSIS — E11.41 TYPE 2 DIABETES MELLITUS WITH DIABETIC MONONEUROPATHY, WITHOUT LONG-TERM CURRENT USE OF INSULIN (HCC): Primary | ICD-10-CM

## 2020-10-08 ENCOUNTER — LAB (OUTPATIENT)
Dept: LAB | Facility: CLINIC | Age: 75
End: 2020-10-08
Payer: COMMERCIAL

## 2020-10-08 DIAGNOSIS — E53.8 VITAMIN B12 DEFICIENCY: ICD-10-CM

## 2020-10-08 DIAGNOSIS — E11.41 TYPE 2 DIABETES MELLITUS WITH DIABETIC MONONEUROPATHY, WITHOUT LONG-TERM CURRENT USE OF INSULIN (HCC): ICD-10-CM

## 2020-10-08 DIAGNOSIS — Z12.11 ENCOUNTER FOR SCREENING FOR COLORECTAL MALIGNANT NEOPLASM: ICD-10-CM

## 2020-10-08 DIAGNOSIS — Z12.12 ENCOUNTER FOR SCREENING FOR COLORECTAL MALIGNANT NEOPLASM: ICD-10-CM

## 2020-10-08 DIAGNOSIS — K22.70 BARRETT'S ESOPHAGUS WITHOUT DYSPLASIA: ICD-10-CM

## 2020-10-08 LAB
ANION GAP SERPL CALCULATED.3IONS-SCNC: 4 MMOL/L (ref 4–13)
BASOPHILS # BLD AUTO: 0.03 THOUSANDS/ΜL (ref 0–0.1)
BASOPHILS NFR BLD AUTO: 0 % (ref 0–1)
BUN SERPL-MCNC: 16 MG/DL (ref 5–25)
CALCIUM SERPL-MCNC: 9.5 MG/DL (ref 8.3–10.1)
CHLORIDE SERPL-SCNC: 109 MMOL/L (ref 100–108)
CO2 SERPL-SCNC: 27 MMOL/L (ref 21–32)
CREAT SERPL-MCNC: 0.96 MG/DL (ref 0.6–1.3)
CREAT UR-MCNC: 128 MG/DL
EOSINOPHIL # BLD AUTO: 0.01 THOUSAND/ΜL (ref 0–0.61)
EOSINOPHIL NFR BLD AUTO: 0 % (ref 0–6)
ERYTHROCYTE [DISTWIDTH] IN BLOOD BY AUTOMATED COUNT: 13.8 % (ref 11.6–15.1)
EST. AVERAGE GLUCOSE BLD GHB EST-MCNC: 143 MG/DL
FOLATE SERPL-MCNC: 14.9 NG/ML (ref 3.1–17.5)
GFR SERPL CREATININE-BSD FRML MDRD: 78 ML/MIN/1.73SQ M
GLUCOSE P FAST SERPL-MCNC: 128 MG/DL (ref 65–99)
HBA1C MFR BLD: 6.6 %
HCT VFR BLD AUTO: 45.2 % (ref 36.5–49.3)
HGB BLD-MCNC: 14.6 G/DL (ref 12–17)
IMM GRANULOCYTES # BLD AUTO: 0.01 THOUSAND/UL (ref 0–0.2)
IMM GRANULOCYTES NFR BLD AUTO: 0 % (ref 0–2)
LYMPHOCYTES # BLD AUTO: 2.12 THOUSANDS/ΜL (ref 0.6–4.47)
LYMPHOCYTES NFR BLD AUTO: 27 % (ref 14–44)
MCH RBC QN AUTO: 31.2 PG (ref 26.8–34.3)
MCHC RBC AUTO-ENTMCNC: 32.3 G/DL (ref 31.4–37.4)
MCV RBC AUTO: 97 FL (ref 82–98)
MICROALBUMIN UR-MCNC: 8.8 MG/L (ref 0–20)
MICROALBUMIN/CREAT 24H UR: 7 MG/G CREATININE (ref 0–30)
MONOCYTES # BLD AUTO: 0.56 THOUSAND/ΜL (ref 0.17–1.22)
MONOCYTES NFR BLD AUTO: 7 % (ref 4–12)
NEUTROPHILS # BLD AUTO: 5.23 THOUSANDS/ΜL (ref 1.85–7.62)
NEUTS SEG NFR BLD AUTO: 66 % (ref 43–75)
NRBC BLD AUTO-RTO: 0 /100 WBCS
PLATELET # BLD AUTO: 150 THOUSANDS/UL (ref 149–390)
PMV BLD AUTO: 9.7 FL (ref 8.9–12.7)
POTASSIUM SERPL-SCNC: 4.4 MMOL/L (ref 3.5–5.3)
RBC # BLD AUTO: 4.68 MILLION/UL (ref 3.88–5.62)
SODIUM SERPL-SCNC: 140 MMOL/L (ref 136–145)
VIT B12 SERPL-MCNC: 143 PG/ML (ref 100–900)
WBC # BLD AUTO: 7.96 THOUSAND/UL (ref 4.31–10.16)

## 2020-10-08 PROCEDURE — 82043 UR ALBUMIN QUANTITATIVE: CPT | Performed by: INTERNAL MEDICINE

## 2020-10-08 PROCEDURE — 82570 ASSAY OF URINE CREATININE: CPT | Performed by: INTERNAL MEDICINE

## 2020-10-08 PROCEDURE — 82746 ASSAY OF FOLIC ACID SERUM: CPT

## 2020-10-08 PROCEDURE — 80048 BASIC METABOLIC PNL TOTAL CA: CPT

## 2020-10-08 PROCEDURE — 83036 HEMOGLOBIN GLYCOSYLATED A1C: CPT

## 2020-10-08 PROCEDURE — 3044F HG A1C LEVEL LT 7.0%: CPT | Performed by: INTERNAL MEDICINE

## 2020-10-08 PROCEDURE — 82607 VITAMIN B-12: CPT

## 2020-10-08 PROCEDURE — 36415 COLL VENOUS BLD VENIPUNCTURE: CPT

## 2020-10-08 PROCEDURE — 85025 COMPLETE CBC W/AUTO DIFF WBC: CPT

## 2020-10-08 PROCEDURE — 3061F NEG MICROALBUMINURIA REV: CPT | Performed by: INTERNAL MEDICINE

## 2020-10-24 DIAGNOSIS — I10 ESSENTIAL HYPERTENSION: ICD-10-CM

## 2020-10-24 DIAGNOSIS — N40.0 BENIGN PROSTATIC HYPERPLASIA WITHOUT LOWER URINARY TRACT SYMPTOMS: ICD-10-CM

## 2020-10-24 DIAGNOSIS — E11.41 TYPE 2 DIABETES MELLITUS WITH DIABETIC MONONEUROPATHY, WITHOUT LONG-TERM CURRENT USE OF INSULIN (HCC): ICD-10-CM

## 2020-10-24 PROCEDURE — 4010F ACE/ARB THERAPY RXD/TAKEN: CPT | Performed by: INTERNAL MEDICINE

## 2020-10-26 RX ORDER — LISINOPRIL 10 MG/1
TABLET ORAL
Qty: 90 TABLET | Refills: 1 | Status: SHIPPED | OUTPATIENT
Start: 2020-10-26 | End: 2020-12-07

## 2020-10-26 RX ORDER — TAMSULOSIN HYDROCHLORIDE 0.4 MG/1
0.4 CAPSULE ORAL DAILY
Qty: 90 CAPSULE | Refills: 0 | Status: SHIPPED | OUTPATIENT
Start: 2020-10-26 | End: 2021-01-18

## 2020-10-26 RX ORDER — GLIMEPIRIDE 4 MG/1
TABLET ORAL
Qty: 180 TABLET | Refills: 0
Start: 2020-10-26 | End: 2020-11-04 | Stop reason: SDUPTHER

## 2020-11-04 ENCOUNTER — EVALUATION (OUTPATIENT)
Dept: PHYSICAL THERAPY | Facility: CLINIC | Age: 75
End: 2020-11-04
Payer: COMMERCIAL

## 2020-11-04 ENCOUNTER — TRANSCRIBE ORDERS (OUTPATIENT)
Dept: PHYSICAL THERAPY | Facility: CLINIC | Age: 75
End: 2020-11-04

## 2020-11-04 DIAGNOSIS — M51.36 DDD (DEGENERATIVE DISC DISEASE), LUMBAR: Primary | ICD-10-CM

## 2020-11-04 DIAGNOSIS — E11.41 TYPE 2 DIABETES MELLITUS WITH DIABETIC MONONEUROPATHY, WITHOUT LONG-TERM CURRENT USE OF INSULIN (HCC): ICD-10-CM

## 2020-11-04 PROCEDURE — 97162 PT EVAL MOD COMPLEX 30 MIN: CPT | Performed by: PHYSICAL THERAPIST

## 2020-11-04 PROCEDURE — 97535 SELF CARE MNGMENT TRAINING: CPT | Performed by: PHYSICAL THERAPIST

## 2020-11-04 PROCEDURE — 97110 THERAPEUTIC EXERCISES: CPT | Performed by: PHYSICAL THERAPIST

## 2020-11-04 RX ORDER — GLIMEPIRIDE 4 MG/1
TABLET ORAL
Qty: 180 TABLET | Refills: 0 | Status: SHIPPED | OUTPATIENT
Start: 2020-11-04 | End: 2021-03-01

## 2020-11-05 ENCOUNTER — OFFICE VISIT (OUTPATIENT)
Dept: PHYSICAL THERAPY | Facility: CLINIC | Age: 75
End: 2020-11-05
Payer: COMMERCIAL

## 2020-11-05 DIAGNOSIS — M51.36 DDD (DEGENERATIVE DISC DISEASE), LUMBAR: Primary | ICD-10-CM

## 2020-11-05 PROCEDURE — 97112 NEUROMUSCULAR REEDUCATION: CPT

## 2020-11-05 PROCEDURE — 97140 MANUAL THERAPY 1/> REGIONS: CPT

## 2020-11-05 PROCEDURE — 97110 THERAPEUTIC EXERCISES: CPT

## 2020-11-10 ENCOUNTER — OFFICE VISIT (OUTPATIENT)
Dept: PHYSICAL THERAPY | Facility: CLINIC | Age: 75
End: 2020-11-10
Payer: COMMERCIAL

## 2020-11-10 DIAGNOSIS — M51.36 DDD (DEGENERATIVE DISC DISEASE), LUMBAR: Primary | ICD-10-CM

## 2020-11-10 PROCEDURE — 97110 THERAPEUTIC EXERCISES: CPT

## 2020-11-10 PROCEDURE — 97140 MANUAL THERAPY 1/> REGIONS: CPT

## 2020-11-12 ENCOUNTER — APPOINTMENT (OUTPATIENT)
Dept: PHYSICAL THERAPY | Facility: CLINIC | Age: 75
End: 2020-11-12
Payer: COMMERCIAL

## 2020-11-13 ENCOUNTER — TELEMEDICINE (OUTPATIENT)
Dept: INTERNAL MEDICINE CLINIC | Facility: CLINIC | Age: 75
End: 2020-11-13
Payer: COMMERCIAL

## 2020-11-13 DIAGNOSIS — K59.00 CONSTIPATION, UNSPECIFIED CONSTIPATION TYPE: Primary | ICD-10-CM

## 2020-11-13 DIAGNOSIS — R55 VASOVAGAL NEAR SYNCOPE: ICD-10-CM

## 2020-11-13 PROCEDURE — 99442 PR PHYS/QHP TELEPHONE EVALUATION 11-20 MIN: CPT | Performed by: INTERNAL MEDICINE

## 2020-11-16 ENCOUNTER — OFFICE VISIT (OUTPATIENT)
Dept: URGENT CARE | Facility: CLINIC | Age: 75
End: 2020-11-16
Payer: COMMERCIAL

## 2020-11-16 ENCOUNTER — TELEPHONE (OUTPATIENT)
Dept: INTERNAL MEDICINE CLINIC | Facility: CLINIC | Age: 75
End: 2020-11-16

## 2020-11-16 ENCOUNTER — APPOINTMENT (OUTPATIENT)
Dept: PHYSICAL THERAPY | Facility: CLINIC | Age: 75
End: 2020-11-16
Payer: COMMERCIAL

## 2020-11-16 VITALS
RESPIRATION RATE: 18 BRPM | HEART RATE: 88 BPM | SYSTOLIC BLOOD PRESSURE: 137 MMHG | DIASTOLIC BLOOD PRESSURE: 66 MMHG | WEIGHT: 259 LBS | OXYGEN SATURATION: 98 % | HEIGHT: 76 IN | TEMPERATURE: 97.2 F | BODY MASS INDEX: 31.54 KG/M2

## 2020-11-16 DIAGNOSIS — J34.89 RHINORRHEA: ICD-10-CM

## 2020-11-16 DIAGNOSIS — R53.83 FATIGUE, UNSPECIFIED TYPE: Primary | ICD-10-CM

## 2020-11-16 DIAGNOSIS — Z11.59 SCREENING FOR VIRAL DISEASE: ICD-10-CM

## 2020-11-16 PROCEDURE — S9083 URGENT CARE CENTER GLOBAL: HCPCS | Performed by: NURSE PRACTITIONER

## 2020-11-16 PROCEDURE — 99213 OFFICE O/P EST LOW 20 MIN: CPT | Performed by: NURSE PRACTITIONER

## 2020-11-16 PROCEDURE — U0003 INFECTIOUS AGENT DETECTION BY NUCLEIC ACID (DNA OR RNA); SEVERE ACUTE RESPIRATORY SYNDROME CORONAVIRUS 2 (SARS-COV-2) (CORONAVIRUS DISEASE [COVID-19]), AMPLIFIED PROBE TECHNIQUE, MAKING USE OF HIGH THROUGHPUT TECHNOLOGIES AS DESCRIBED BY CMS-2020-01-R: HCPCS | Performed by: NURSE PRACTITIONER

## 2020-11-19 ENCOUNTER — OFFICE VISIT (OUTPATIENT)
Dept: PHYSICAL THERAPY | Facility: CLINIC | Age: 75
End: 2020-11-19
Payer: COMMERCIAL

## 2020-11-19 DIAGNOSIS — M51.36 DDD (DEGENERATIVE DISC DISEASE), LUMBAR: Primary | ICD-10-CM

## 2020-11-19 PROCEDURE — 97140 MANUAL THERAPY 1/> REGIONS: CPT

## 2020-11-19 PROCEDURE — 97112 NEUROMUSCULAR REEDUCATION: CPT

## 2020-11-19 PROCEDURE — 97110 THERAPEUTIC EXERCISES: CPT

## 2020-11-20 LAB
FLUAV RNA NPH QL NAA+PROBE: NOT DETECTED
FLUBV RNA NPH QL NAA+PROBE: NOT DETECTED
RSV RNA NPH QL NAA+PROBE: NOT DETECTED
SARS-COV-2 RNA NPH QL NAA+PROBE: NOT DETECTED

## 2020-11-23 ENCOUNTER — TELEPHONE (OUTPATIENT)
Dept: URGENT CARE | Facility: CLINIC | Age: 75
End: 2020-11-23

## 2020-11-24 ENCOUNTER — OFFICE VISIT (OUTPATIENT)
Dept: PHYSICAL THERAPY | Facility: CLINIC | Age: 75
End: 2020-11-24
Payer: COMMERCIAL

## 2020-11-24 DIAGNOSIS — M51.36 DDD (DEGENERATIVE DISC DISEASE), LUMBAR: Primary | ICD-10-CM

## 2020-11-24 PROCEDURE — 97112 NEUROMUSCULAR REEDUCATION: CPT

## 2020-11-24 PROCEDURE — 97110 THERAPEUTIC EXERCISES: CPT

## 2020-11-24 PROCEDURE — 97140 MANUAL THERAPY 1/> REGIONS: CPT

## 2020-11-27 ENCOUNTER — EVALUATION (OUTPATIENT)
Dept: PHYSICAL THERAPY | Facility: CLINIC | Age: 75
End: 2020-11-27
Payer: COMMERCIAL

## 2020-11-27 DIAGNOSIS — M51.36 DDD (DEGENERATIVE DISC DISEASE), LUMBAR: Primary | ICD-10-CM

## 2020-11-27 PROCEDURE — 97140 MANUAL THERAPY 1/> REGIONS: CPT

## 2020-11-27 PROCEDURE — 97110 THERAPEUTIC EXERCISES: CPT

## 2020-11-27 PROCEDURE — 97112 NEUROMUSCULAR REEDUCATION: CPT

## 2020-11-27 PROCEDURE — 97140 MANUAL THERAPY 1/> REGIONS: CPT | Performed by: PHYSICAL THERAPIST

## 2020-11-30 ENCOUNTER — OFFICE VISIT (OUTPATIENT)
Dept: PHYSICAL THERAPY | Facility: CLINIC | Age: 75
End: 2020-11-30
Payer: COMMERCIAL

## 2020-11-30 ENCOUNTER — TRANSCRIBE ORDERS (OUTPATIENT)
Dept: PHYSICAL THERAPY | Facility: CLINIC | Age: 75
End: 2020-11-30

## 2020-11-30 DIAGNOSIS — M51.36 DDD (DEGENERATIVE DISC DISEASE), LUMBAR: Primary | ICD-10-CM

## 2020-11-30 DIAGNOSIS — M51.36 DEGENERATION OF LUMBAR INTERVERTEBRAL DISC: Primary | ICD-10-CM

## 2020-11-30 PROCEDURE — 97140 MANUAL THERAPY 1/> REGIONS: CPT | Performed by: PHYSICAL THERAPIST

## 2020-11-30 PROCEDURE — 97110 THERAPEUTIC EXERCISES: CPT | Performed by: PHYSICAL THERAPIST

## 2020-11-30 PROCEDURE — 97112 NEUROMUSCULAR REEDUCATION: CPT | Performed by: PHYSICAL THERAPIST

## 2020-12-03 ENCOUNTER — OFFICE VISIT (OUTPATIENT)
Dept: PHYSICAL THERAPY | Facility: CLINIC | Age: 75
End: 2020-12-03
Payer: COMMERCIAL

## 2020-12-03 DIAGNOSIS — M51.36 DDD (DEGENERATIVE DISC DISEASE), LUMBAR: Primary | ICD-10-CM

## 2020-12-03 PROCEDURE — 97110 THERAPEUTIC EXERCISES: CPT | Performed by: PHYSICAL THERAPIST

## 2020-12-03 PROCEDURE — 97140 MANUAL THERAPY 1/> REGIONS: CPT | Performed by: PHYSICAL THERAPIST

## 2020-12-07 ENCOUNTER — TELEPHONE (OUTPATIENT)
Dept: INTERNAL MEDICINE CLINIC | Facility: CLINIC | Age: 75
End: 2020-12-07

## 2020-12-07 ENCOUNTER — OFFICE VISIT (OUTPATIENT)
Dept: PHYSICAL THERAPY | Facility: CLINIC | Age: 75
End: 2020-12-07
Payer: COMMERCIAL

## 2020-12-07 DIAGNOSIS — I10 ESSENTIAL HYPERTENSION: ICD-10-CM

## 2020-12-07 DIAGNOSIS — M51.36 DDD (DEGENERATIVE DISC DISEASE), LUMBAR: Primary | ICD-10-CM

## 2020-12-07 DIAGNOSIS — I10 ESSENTIAL HYPERTENSION: Primary | ICD-10-CM

## 2020-12-07 PROCEDURE — 97112 NEUROMUSCULAR REEDUCATION: CPT

## 2020-12-07 PROCEDURE — 97140 MANUAL THERAPY 1/> REGIONS: CPT

## 2020-12-07 PROCEDURE — 97110 THERAPEUTIC EXERCISES: CPT

## 2020-12-07 RX ORDER — LISINOPRIL 10 MG/1
10 TABLET ORAL DAILY
Qty: 90 TABLET | Refills: 1
Start: 2020-12-07 | End: 2021-01-20 | Stop reason: SDUPTHER

## 2020-12-07 RX ORDER — AMLODIPINE BESYLATE 5 MG/1
5 TABLET ORAL DAILY
Qty: 90 TABLET | Refills: 3 | Status: SHIPPED | OUTPATIENT
Start: 2020-12-07 | End: 2020-12-07

## 2020-12-10 ENCOUNTER — OFFICE VISIT (OUTPATIENT)
Dept: PHYSICAL THERAPY | Facility: CLINIC | Age: 75
End: 2020-12-10
Payer: COMMERCIAL

## 2020-12-10 DIAGNOSIS — M51.36 DDD (DEGENERATIVE DISC DISEASE), LUMBAR: Primary | ICD-10-CM

## 2020-12-10 PROCEDURE — 97112 NEUROMUSCULAR REEDUCATION: CPT

## 2020-12-10 PROCEDURE — 97110 THERAPEUTIC EXERCISES: CPT

## 2020-12-10 PROCEDURE — 97140 MANUAL THERAPY 1/> REGIONS: CPT

## 2020-12-14 ENCOUNTER — OFFICE VISIT (OUTPATIENT)
Dept: PHYSICAL THERAPY | Facility: CLINIC | Age: 75
End: 2020-12-14
Payer: COMMERCIAL

## 2020-12-14 DIAGNOSIS — M51.36 DDD (DEGENERATIVE DISC DISEASE), LUMBAR: Primary | ICD-10-CM

## 2020-12-14 PROCEDURE — 97112 NEUROMUSCULAR REEDUCATION: CPT

## 2020-12-14 PROCEDURE — 97110 THERAPEUTIC EXERCISES: CPT

## 2020-12-14 PROCEDURE — 97140 MANUAL THERAPY 1/> REGIONS: CPT

## 2020-12-17 ENCOUNTER — APPOINTMENT (OUTPATIENT)
Dept: PHYSICAL THERAPY | Facility: CLINIC | Age: 75
End: 2020-12-17
Payer: COMMERCIAL

## 2020-12-18 ENCOUNTER — OFFICE VISIT (OUTPATIENT)
Dept: PHYSICAL THERAPY | Facility: CLINIC | Age: 75
End: 2020-12-18
Payer: COMMERCIAL

## 2020-12-18 DIAGNOSIS — M51.36 DDD (DEGENERATIVE DISC DISEASE), LUMBAR: Primary | ICD-10-CM

## 2020-12-18 PROCEDURE — 97140 MANUAL THERAPY 1/> REGIONS: CPT

## 2020-12-18 PROCEDURE — 97110 THERAPEUTIC EXERCISES: CPT

## 2020-12-18 PROCEDURE — 97112 NEUROMUSCULAR REEDUCATION: CPT

## 2020-12-21 ENCOUNTER — EVALUATION (OUTPATIENT)
Dept: PHYSICAL THERAPY | Facility: CLINIC | Age: 75
End: 2020-12-21
Payer: COMMERCIAL

## 2020-12-21 DIAGNOSIS — M51.36 DDD (DEGENERATIVE DISC DISEASE), LUMBAR: Primary | ICD-10-CM

## 2020-12-21 PROCEDURE — 97140 MANUAL THERAPY 1/> REGIONS: CPT | Performed by: PHYSICAL THERAPIST

## 2020-12-21 PROCEDURE — 97112 NEUROMUSCULAR REEDUCATION: CPT

## 2020-12-21 PROCEDURE — 97110 THERAPEUTIC EXERCISES: CPT

## 2020-12-21 PROCEDURE — 97140 MANUAL THERAPY 1/> REGIONS: CPT

## 2020-12-22 ENCOUNTER — TRANSCRIBE ORDERS (OUTPATIENT)
Dept: PHYSICAL THERAPY | Facility: CLINIC | Age: 75
End: 2020-12-22

## 2020-12-22 DIAGNOSIS — M51.36 DDD (DEGENERATIVE DISC DISEASE), LUMBAR: Primary | ICD-10-CM

## 2020-12-23 ENCOUNTER — OFFICE VISIT (OUTPATIENT)
Dept: INTERNAL MEDICINE CLINIC | Facility: CLINIC | Age: 75
End: 2020-12-23
Payer: COMMERCIAL

## 2020-12-23 VITALS
OXYGEN SATURATION: 97 % | DIASTOLIC BLOOD PRESSURE: 70 MMHG | TEMPERATURE: 98.3 F | HEIGHT: 76 IN | RESPIRATION RATE: 18 BRPM | WEIGHT: 264 LBS | SYSTOLIC BLOOD PRESSURE: 142 MMHG | HEART RATE: 95 BPM | BODY MASS INDEX: 32.15 KG/M2

## 2020-12-23 DIAGNOSIS — E78.2 MIXED HYPERLIPIDEMIA: ICD-10-CM

## 2020-12-23 DIAGNOSIS — K22.70 BARRETT'S ESOPHAGUS WITHOUT DYSPLASIA: ICD-10-CM

## 2020-12-23 DIAGNOSIS — I10 ESSENTIAL HYPERTENSION: ICD-10-CM

## 2020-12-23 DIAGNOSIS — N40.0 BENIGN PROSTATIC HYPERPLASIA WITHOUT LOWER URINARY TRACT SYMPTOMS: ICD-10-CM

## 2020-12-23 DIAGNOSIS — E53.8 VITAMIN B12 DEFICIENCY: ICD-10-CM

## 2020-12-23 DIAGNOSIS — I89.0 LYMPHEDEMA: ICD-10-CM

## 2020-12-23 DIAGNOSIS — E11.41 TYPE 2 DIABETES MELLITUS WITH DIABETIC MONONEUROPATHY, WITHOUT LONG-TERM CURRENT USE OF INSULIN (HCC): Primary | ICD-10-CM

## 2020-12-23 DIAGNOSIS — E11.21 DIABETIC NEPHROPATHY ASSOCIATED WITH TYPE 2 DIABETES MELLITUS (HCC): ICD-10-CM

## 2020-12-23 DIAGNOSIS — M15.9 PRIMARY OSTEOARTHRITIS INVOLVING MULTIPLE JOINTS: ICD-10-CM

## 2020-12-23 LAB — SL AMB POCT HEMOGLOBIN AIC: 6.8 (ref ?–6.5)

## 2020-12-23 PROCEDURE — 3288F FALL RISK ASSESSMENT DOCD: CPT | Performed by: INTERNAL MEDICINE

## 2020-12-23 PROCEDURE — 1101F PT FALLS ASSESS-DOCD LE1/YR: CPT | Performed by: INTERNAL MEDICINE

## 2020-12-23 PROCEDURE — 3078F DIAST BP <80 MM HG: CPT | Performed by: INTERNAL MEDICINE

## 2020-12-23 PROCEDURE — 83036 HEMOGLOBIN GLYCOSYLATED A1C: CPT | Performed by: INTERNAL MEDICINE

## 2020-12-23 PROCEDURE — 3725F SCREEN DEPRESSION PERFORMED: CPT | Performed by: INTERNAL MEDICINE

## 2020-12-23 PROCEDURE — 99214 OFFICE O/P EST MOD 30 MIN: CPT | Performed by: INTERNAL MEDICINE

## 2020-12-23 PROCEDURE — 3044F HG A1C LEVEL LT 7.0%: CPT | Performed by: INTERNAL MEDICINE

## 2020-12-23 PROCEDURE — 3066F NEPHROPATHY DOC TX: CPT | Performed by: INTERNAL MEDICINE

## 2020-12-23 PROCEDURE — 3077F SYST BP >= 140 MM HG: CPT | Performed by: INTERNAL MEDICINE

## 2020-12-23 PROCEDURE — 1036F TOBACCO NON-USER: CPT | Performed by: INTERNAL MEDICINE

## 2020-12-23 PROCEDURE — 3008F BODY MASS INDEX DOCD: CPT | Performed by: INTERNAL MEDICINE

## 2020-12-23 PROCEDURE — 1160F RVW MEDS BY RX/DR IN RCRD: CPT | Performed by: INTERNAL MEDICINE

## 2020-12-24 ENCOUNTER — OFFICE VISIT (OUTPATIENT)
Dept: PHYSICAL THERAPY | Facility: CLINIC | Age: 75
End: 2020-12-24
Payer: COMMERCIAL

## 2020-12-24 DIAGNOSIS — M51.36 DDD (DEGENERATIVE DISC DISEASE), LUMBAR: Primary | ICD-10-CM

## 2020-12-24 PROCEDURE — 97110 THERAPEUTIC EXERCISES: CPT

## 2020-12-24 PROCEDURE — 97112 NEUROMUSCULAR REEDUCATION: CPT

## 2020-12-24 PROCEDURE — 97140 MANUAL THERAPY 1/> REGIONS: CPT

## 2020-12-24 PROCEDURE — 97014 ELECTRIC STIMULATION THERAPY: CPT

## 2020-12-28 ENCOUNTER — OFFICE VISIT (OUTPATIENT)
Dept: PHYSICAL THERAPY | Facility: CLINIC | Age: 75
End: 2020-12-28
Payer: COMMERCIAL

## 2020-12-28 DIAGNOSIS — M51.36 DDD (DEGENERATIVE DISC DISEASE), LUMBAR: Primary | ICD-10-CM

## 2020-12-28 PROCEDURE — 97014 ELECTRIC STIMULATION THERAPY: CPT

## 2020-12-28 PROCEDURE — 97140 MANUAL THERAPY 1/> REGIONS: CPT

## 2020-12-28 PROCEDURE — 97112 NEUROMUSCULAR REEDUCATION: CPT

## 2020-12-28 PROCEDURE — 97110 THERAPEUTIC EXERCISES: CPT

## 2020-12-31 ENCOUNTER — OFFICE VISIT (OUTPATIENT)
Dept: PHYSICAL THERAPY | Facility: CLINIC | Age: 75
End: 2020-12-31
Payer: COMMERCIAL

## 2020-12-31 DIAGNOSIS — M51.36 DDD (DEGENERATIVE DISC DISEASE), LUMBAR: Primary | ICD-10-CM

## 2020-12-31 PROCEDURE — 97110 THERAPEUTIC EXERCISES: CPT

## 2020-12-31 PROCEDURE — 97014 ELECTRIC STIMULATION THERAPY: CPT

## 2020-12-31 PROCEDURE — 97140 MANUAL THERAPY 1/> REGIONS: CPT

## 2020-12-31 PROCEDURE — 97112 NEUROMUSCULAR REEDUCATION: CPT

## 2021-01-04 ENCOUNTER — OFFICE VISIT (OUTPATIENT)
Dept: PHYSICAL THERAPY | Facility: CLINIC | Age: 76
End: 2021-01-04
Payer: COMMERCIAL

## 2021-01-04 DIAGNOSIS — M51.36 DDD (DEGENERATIVE DISC DISEASE), LUMBAR: Primary | ICD-10-CM

## 2021-01-04 PROCEDURE — 97140 MANUAL THERAPY 1/> REGIONS: CPT

## 2021-01-04 PROCEDURE — 97110 THERAPEUTIC EXERCISES: CPT

## 2021-01-04 PROCEDURE — 97112 NEUROMUSCULAR REEDUCATION: CPT

## 2021-01-04 NOTE — PROGRESS NOTES
Daily Note     Today's date: 2021  Patient name: Sivan Gallo  : 1945  MRN: [id number]  Referring provider: Yazan Johnson MD  Dx:   Encounter Diagnosis     ICD-10-CM    1  DDD (degenerative disc disease), lumbar  M51 36                   Subjective: Pt states he has no new complaints from last session  Objective: See treatment diary below      Assessment: Pt progressed through exercises well without increase in pain and min to moderate fatiuge  Pt had decreased pain/discnofrt post manual PT  Pt continues to be challenged with standing exercises  Pt would continue to benefit from PT       Plan: Continue per plan of care  Progress treatment as tolerated         Precautions:  Bilateral TKR, DM, Diabetic Neuropathy, GERD, Rectus Abdominus     Manuals      Bilateral HS, quad, gastroc, piriformis, hip ABD, and manual hip traction  15 min with manual lumbar traction  20 min with manual lumbar traction  15 min with manual lumbar traction 15 min 15 min                                Neuro Re-Ed         Stand OAL  10x3" Hold   Bilat  10x 3"   Hold  bilat 15x 3" hold   bilat  15x 3" hold  bilat 15x 3" hold     Stand Lumbar extension against bar  15x5" Hold  15x 5" hold 15x 5"-10" hold 15x 5"-10"  hold 15 x 5" 0-10" hold    Monster walk  6x10 Feet  Blue   6x 10 feet  Blue  6x 10 feet  Blue  6 x 10 feet BTB     Side step and squat          Seated DD Trunk rotation and PNF  10x   Bilateral         Seated DD LAQ and hip march  2x10  each 2x10  each 2x10  Each  2x10  Each  1 5#  2 x 10 ea     Bridge with Tball                                              Ther Ex         SRC  10 min L6 10 min L6  10 min L6  10 min L6 10  mins L6     Hip AB/AD machine  2x10   60#/AB  30+1#/AD  2x10  60# AB  45# AD 2x10  60# AB  45# AD 2 x 10   60# AB   45# AD     Leg and calf press  2x10 Each   55#   2x10  Each   55#  2x10  Each  55# 2  x10 ea  55#     Supine Tball ABD crunch  10x5" Hold  10x 5" hold  10x 5" hold  10x 5" hold 10x 5" hold     TBall LTR 5" hd 10x5" Hold   Bilat  10x 5" hold   bilat 10x 5" hold  15x 5" hold 15 x 5" hold     TBall DKTC 5" hd 10x5" Hold  10x 5" hold  10x 5" hold  15x 5" hold 15 x 5" hold     Open door trunk rotation   10x5" Hold   Bilat  10x 5" hold  10x 5" hold  10x 5" hold with overpressure  10 x5" hold with OP                                         HEP update/review          Ther Activity                           Gait Training                           Modalities         MHP to the LB in seated  15 min  15 min with IFC 15 min with IFC 15 min with IFC 15 min IFC

## 2021-01-06 ENCOUNTER — OFFICE VISIT (OUTPATIENT)
Dept: PHYSICAL THERAPY | Facility: CLINIC | Age: 76
End: 2021-01-06
Payer: COMMERCIAL

## 2021-01-06 DIAGNOSIS — M51.36 DDD (DEGENERATIVE DISC DISEASE), LUMBAR: Primary | ICD-10-CM

## 2021-01-06 PROCEDURE — 97014 ELECTRIC STIMULATION THERAPY: CPT

## 2021-01-06 PROCEDURE — 97110 THERAPEUTIC EXERCISES: CPT

## 2021-01-06 PROCEDURE — 97140 MANUAL THERAPY 1/> REGIONS: CPT

## 2021-01-06 PROCEDURE — 97112 NEUROMUSCULAR REEDUCATION: CPT

## 2021-01-06 NOTE — PROGRESS NOTES
Daily Note     Today's date: 2021  Patient name: Dana Agosto  : 1945  MRN: [id number]  Referring provider: Alex Lopez MD  Dx:   Encounter Diagnosis     ICD-10-CM    1  DDD (degenerative disc disease), lumbar  M51 36                   Subjective: Patient reports centralizing back pain  Objective: See treatment diary below      Assessment: Tolerated treatment fair  Patient demonstrated fatigue post treatment and required frequent rests  Patient would benefit from continued therapy to decrease pain and increase strength and flexibility to improve overall LOF      Plan: Continue per plan of care        Precautions:  Bilateral TKR, DM, Diabetic Neuropathy, GERD, Rectus Abdominus     Manuals    Bilateral HS, quad, gastroc, piriformis, hip ABD, and manual hip traction  15 min with manual lumbar traction  20 min with manual lumbar traction  15 min with manual lumbar traction 15 min 15 min  15 min                              Neuro Re-Ed         Stand OAL  10x3" Hold   Bilat  10x 3"   Hold  bilat 15x 3" hold   bilat  15x 3" hold  bilat 15x 3" hold  15x 3" hd   Stand Lumbar extension against bar  15x5" Hold  15x 5" hold 15x 5"-10" hold 15x 5"-10"  hold 15 x 5" 0-10" hold 15x 5" hold   Monster walk  6x10 Feet  Blue   6x 10 feet  Blue  6x 10 feet  Blue  6 x 10 feet BTB  6x 10 ft Blue   Side step and squat          Seated DD Trunk rotation and PNF  10x   Bilateral         Seated DD LAQ and hip march  2x10  each 2x10  each 2x10  Each  2x10  Each  1 5#  2 x 10 ea  1 5# 2x10 ea   Bridge with Tball                                              Ther Ex         SRC  10 min L6 10 min L6  10 min L6  10 min L6 10  mins L6  10 min L6   Hip AB/AD machine  2x10   60#/AB  30+1#/AD  2x10  60# AB  45# AD 2x10  60# AB  45# AD 2 x 10   60# AB   45# AD  2x10  60+1# AB  45+1# AD   Leg and calf press  2x10 Each   55#   2x10  Each   55#  2x10  Each  55# 2  x10 ea  55#  2x10 ea  60#   Supine Tball ABD crunch  10x5" Hold  10x 5" hold  10x 5" hold  10x 5" hold 10x 5" hold  10x 5" hold   TBall LTR 5" hd 10x5" Hold   Bilat  10x 5" hold   bilat 10x 5" hold  15x 5" hold 15 x 5" hold  15x 5" hd   TBall DKTC 5" hd 10x5" Hold  10x 5" hold  10x 5" hold  15x 5" hold 15 x 5" hold  15x 5" hd   Open door trunk rotation   10x5" Hold   Bilat  10x 5" hold  10x 5" hold  10x 5" hold with overpressure  10 x5" hold with OP                                         HEP update/review          Ther Activity                           Gait Training                           Modalities         MHP to the LB in seated  15 min  15 min with IFC 15 min with IFC 15 min with IFC 15 min IFC  15 min w/ IFC

## 2021-01-12 ENCOUNTER — LAB (OUTPATIENT)
Dept: LAB | Facility: CLINIC | Age: 76
End: 2021-01-12
Payer: COMMERCIAL

## 2021-01-12 DIAGNOSIS — E11.41 TYPE 2 DIABETES MELLITUS WITH DIABETIC MONONEUROPATHY, WITHOUT LONG-TERM CURRENT USE OF INSULIN (HCC): ICD-10-CM

## 2021-01-12 DIAGNOSIS — E78.2 MIXED HYPERLIPIDEMIA: ICD-10-CM

## 2021-01-12 LAB
LDLC SERPL DIRECT ASSAY-MCNC: 118 MG/DL (ref 0–100)
TRIGL SERPL-MCNC: 129 MG/DL

## 2021-01-12 PROCEDURE — 84478 ASSAY OF TRIGLYCERIDES: CPT

## 2021-01-12 PROCEDURE — 36415 COLL VENOUS BLD VENIPUNCTURE: CPT

## 2021-01-12 PROCEDURE — 83721 ASSAY OF BLOOD LIPOPROTEIN: CPT

## 2021-01-13 ENCOUNTER — OFFICE VISIT (OUTPATIENT)
Dept: PHYSICAL THERAPY | Facility: CLINIC | Age: 76
End: 2021-01-13
Payer: COMMERCIAL

## 2021-01-13 DIAGNOSIS — M51.36 DDD (DEGENERATIVE DISC DISEASE), LUMBAR: Primary | ICD-10-CM

## 2021-01-13 PROCEDURE — 97140 MANUAL THERAPY 1/> REGIONS: CPT | Performed by: PHYSICAL THERAPIST

## 2021-01-13 PROCEDURE — 97110 THERAPEUTIC EXERCISES: CPT | Performed by: PHYSICAL THERAPIST

## 2021-01-13 PROCEDURE — 97112 NEUROMUSCULAR REEDUCATION: CPT | Performed by: PHYSICAL THERAPIST

## 2021-01-13 NOTE — PROGRESS NOTES
Daily Note     Today's date: 2021  Patient name: Yasmin Cuevas  : 1945  MRN: [id number]  Referring provider: Zelalem Mccann MD  Dx:   Encounter Diagnosis     ICD-10-CM    1  DDD (degenerative disc disease), lumbar  M51 36                   Subjective:  Patient reports he feels blah today with overall decrease pain levels in the low back to 1/10 level  Patient reports limitations with bending and lifting to all levels at home as well as continuation of feeling of weakness in the left hip and LE  Objective: See treatment diary below      Assessment: Tolerated treatment well  PT notes with continuation of progression of TE program with focus on lumbar stabilization and manual therapy to decrease pain levels and improve functional limitations to meet therapy goals  PT notes the patient with continuation of decrease trunk/core strength with impaired spinal stability with need for continuation of skilled therapy  Plan: Continue per plan of care        Precautions:  Bilateral TKR, DM, Diabetic Neuropathy, GERD, Rectus Abdominus     Manuals    Bilateral HS, quad, gastroc, piriformis, hip ABD, and manual hip traction  15 min with manual lumbar traction   15 min with manual lumbar traction 15 min 15 min  15 min                              Neuro Re-Ed         Stand OAL  15x3" Hold   Bilat   15x 3" hold   bilat  15x 3" hold  bilat 15x 3" hold  15x 3" hd   Stand Lumbar extension against bar  15x5" Hold   15x 5"-10" hold 15x 5"-10"  hold 15 x 5" 0-10" hold 15x 5" hold   Monster walk  6x10 Feet   Blue   6x 10 feet  Blue  6x 10 feet  Blue  6 x 10 feet BTB  6x 10 ft Blue   Side step and squat          Seated DD Trunk rotation and PNF  2x10 Each   Red PG Ball         Seated DD LAQ and hip march  2x15 Each   1 5#   2x10  Each  2x10  Each  1 5#  2 x 10 ea  1 5# 2x10 ea   Bridge with Tball                                              Ther Ex         SRC  10 min L6   10 min L6 10 min L6 10  mins L6  10 min L6   Hip AB/AD machine  2x10   Each   60+1#/AB  45+1#/AD  2x10  60# AB  45# AD 2x10  60# AB  45# AD 2 x 10   60# AB   45# AD  2x10  60+1# AB  45+1# AD   Leg and calf press  2x10 Each   60#   2x10  Each   55#  2x10  Each  55# 2  x10 ea  55#  2x10 ea  60#   Supine Tball ABD crunch  10x5"  10x 5" hold  10x 5" hold 10x 5" hold  10x 5" hold   TBall LTR 5" hd 10x5" Hold   10x 5" hold  15x 5" hold 15 x 5" hold  15x 5" hd   TBall DKTC 5" hd 10x5" Hold   10x 5" hold  15x 5" hold 15 x 5" hold  15x 5" hd   Open door trunk rotation   10x5" Hold Bilat   10x 5" hold  10x 5" hold with overpressure  10 x5" hold with OP                                         HEP update/review          Ther Activity                           Gait Training                           Modalities         MHP to the LB in seated  15 min   15 min with IFC 15 min with IFC 15 min IFC  15 min w/ IFC

## 2021-01-14 ENCOUNTER — EVALUATION (OUTPATIENT)
Dept: PHYSICAL THERAPY | Facility: CLINIC | Age: 76
End: 2021-01-14
Payer: COMMERCIAL

## 2021-01-14 DIAGNOSIS — M51.36 DDD (DEGENERATIVE DISC DISEASE), LUMBAR: Primary | ICD-10-CM

## 2021-01-14 PROCEDURE — 97112 NEUROMUSCULAR REEDUCATION: CPT

## 2021-01-14 PROCEDURE — 97140 MANUAL THERAPY 1/> REGIONS: CPT | Performed by: PHYSICAL THERAPIST

## 2021-01-14 PROCEDURE — 97110 THERAPEUTIC EXERCISES: CPT

## 2021-01-14 NOTE — PROGRESS NOTES
Daily Note     Today's date: 2021  Patient name: Mary Gomez  : 1945  MRN: [id number]  Referring provider: Tani Josue MD  Dx:   Encounter Diagnosis     ICD-10-CM    1  DDD (degenerative disc disease), lumbar  M51 36                   Subjective: patient continues to c/o low back pain  Objective: See treatment diary below      Assessment: Patient progressing well towards goals  Good execution of exercise with program  Patient demonstrated the appropriate amount of fatigue for program   Will continue to monitor pain levels and progress as able  See Re-Eval for details  Plan: Continue per plan of care  Progress treatment as tolerated         Precautions:  Bilateral TKR, DM, Diabetic Neuropathy, GERD, Rectus Abdominus     Manuals    Bilateral HS, quad, gastroc, piriformis, hip ABD, and manual hip traction  15 min with manual lumbar traction  15 min   Manual performed by PT  15 min 15 min  15 min                              Neuro Re-Ed         Stand OAL  15x3" Hold   Bilat  15x 3" hold  bilat  15x 3" hold  bilat 15x 3" hold  15x 3" hd   Stand Lumbar extension against bar  15x5" Hold  15x 5" hold   15x 5"-10"  hold 15 x 5" 0-10" hold 15x 5" hold   Monster walk  6x10 Feet   Blue  6x 10 feet  Blue   6x 10 feet  Blue  6 x 10 feet BTB  6x 10 ft Blue   Side step and squat          Seated DD Trunk rotation and PNF  2x10 Each   Red PG Ball  2x10  Each  Red MB         Seated DD LAQ and hip march  2x15 Each   1 5#  2x15  Each  1 5#  2x10  Each  1 5#  2 x 10 ea  1 5# 2x10 ea   Bridge with Tball                                              Ther Ex         SRC  10 min L6  10 min L6  10 min L6 10  mins L6  10 min L6   Hip AB/AD machine  2x10   Each   60+1#/AB  45+1#/AD 2x10  Each  60+1# AB  45+1# AD  2x10  60# AB  45# AD 2 x 10   60# AB   45# AD  2x10  60+1# AB  45+1# AD   Leg and calf press  2x10 Each   60#  2x10  Each  60#  2x10  Each  55# 2  x10 ea  55#  2x10 ea  60# Supine Tball ABD crunch  10x5" 10x 5" hold   10x 5" hold 10x 5" hold  10x 5" hold   TBall LTR 5" hd 10x5" Hold  10x 5" hold   15x 5" hold 15 x 5" hold  15x 5" hd   TBall DKTC 5" hd 10x5" Hold  10x 5" hold   15x 5" hold 15 x 5" hold  15x 5" hd   Open door trunk rotation   10x5" Hold Bilat  10x 5" hold   10x 5" hold with overpressure  10 x5" hold with OP                                         HEP update/review          Ther Activity                           Gait Training                           Modalities         MHP to the LB in seated  15 min    15 min with IFC 15 min IFC  15 min w/ IFC

## 2021-01-14 NOTE — PROGRESS NOTES
PT Re-Evaluation     Today's date: 2021  Patient name: Lisandro Beaver  : 1945  MRN: [id number]  Referring provider: Moraima Reagan MD  Dx:   Encounter Diagnosis     ICD-10-CM    1  DDD (degenerative disc disease), lumbar  M51 36                   Assessment  Assessment details: PT notes the patient with continuation of decrease ROM and strength t/o lumbar spine with trunk/core weakness leading to increase pain levels and functional limitations with need for course of skilled therapy for 4 weeks with focus on lumbar stabilization, strengthening, manual therapy, analgesic modalities, and update/review of HEP  Impairments: abnormal or restricted ROM, activity intolerance, impaired physical strength, lacks appropriate home exercise program, pain with function and poor posture   Understanding of Dx/Px/POC: good   Prognosis: good    Goals  ST  Initiate HEP-MET   2  Decrease pain levels by 25-50%-MET   3  Increase strength by 1-2 mm grades-MET  4  Increase ROM by 25-50%-Partial MET   LT  Improve spinal stabilization-Partial MET  2  Decrease limitations with standing, walking, and stairs-Partial MET  3  Decrease limitations with trunk movement, lifting, and carrying-Partial MET  4  Decrease limitations with ADL-Partial MET  5   DC with HEP-Progressing     Plan  Plan details: PT notes review of POC and findings with patient who is in agreement with PT recommendations of continuation of skilled therapy      Patient would benefit from: PT eval and skilled physical therapy  Planned modality interventions: thermotherapy: hydrocollator packs  Planned therapy interventions: manual therapy, neuromuscular re-education, patient education, self care, strengthening, stretching, therapeutic exercise, home exercise program, graded exercise, flexibility and gait training  Frequency: 2x week  Duration in weeks: 4  Treatment plan discussed with: patient        Subjective Evaluation    History of Present Illness  Mechanism of injury: Patient reports bilateral hip and LB pain for several months with insidious onset with progressive worsening over time leading to limitations with walking, standing, stairs, transfers, lifting, carrying, and ADL   Patient reports he went to ortho MD for evaluation and received MRI and x-ray of the lumbar spine with findings of DDD t/o lumbar spine  Patient reports he was referred to OPPT for evaluation and treatment of LBP  Patient reports he is retired with significant PMH of GERD, bilateral TKR, DM, diabetic neuropathy, HTN, and rectus abdominus  Presently the patient has attended 20 sessions of skilled therapy and feels 80-90% improvement since the start of therapy  Patient feels his back is moving better with decrease pain levels but continuation of weakness in the left LE with limitations with walking with need for continuation of skilled therapy  Patient reports limitations with FB of the trunk and lifting of household objects as well as difficulty with assisting/helping his spouse with transfers and ADL  Patient feels he needs more therapy to continue to get stronger in the back and legs so he can help his spouse and keep his pain levels low     Pain  Current pain ratin  At best pain ratin  At worst pain ratin  Location: Lumbar spine and bilateral hips   Quality: radiating, sharp and pulling  Relieving factors: rest  Aggravating factors: stair climbing, walking, standing and lifting  Progression: improved      Diagnostic Tests  X-ray: abnormal  MRI studies: abnormal  Treatments  Current treatment: physical therapy  Patient Goals  Patient goals for therapy: decreased pain, increased motion, increased strength, independence with ADLs/IADLs and return to sport/leisure activities          Objective     Postural Observations  Seated posture: fair  Standing posture: fair    Additional Postural Observation Details  PT notes flattening of the lumbar spine Palpation   Left   Muscle spasm in the erector spinae and lumbar paraspinals  Tenderness of the erector spinae and lumbar paraspinals  Right   Muscle spasm in the iliacus and lumbar paraspinals  Tenderness of the iliacus and lumbar paraspinals  Tenderness     Left Hip   Tenderness in the greater trochanter  Right Hip   Tenderness in the greater trochanter  Neurological Testing     Reflexes   Left   Patellar (L4): normal (2+)  Achilles (S1): normal (2+)    Right   Patellar (L4): normal (2+)  Achilles (S1): normal (2+)    Active Range of Motion     Lumbar   Flexion:  WFL  Extension: 25 degrees   Left lateral flexion:  WFL and with pain  Right lateral flexion:  WFL and with pain  Left rotation: 19 degrees  with pain  Right rotation: 17 degrees  with pain  Left Hip   Flexion: 51 degrees   Abduction: WFL  External rotation (90/90): 25 degrees   Internal rotation (90/90): 12 degrees     Right Hip   Flexion: 59 degrees   Abduction: WFL  External rotation (90/90): 24 degrees   Internal rotation (90/90): 13 degrees   Left Knee   Normal active range of motion    Right Knee   Normal active range of motion    Additional Active Range of Motion Details  PT notes decrease ROM and strength t/o the lumbar spine  PT notes trunk/core weakness with abdominals of 3/5 and lumbar paraspinals of 3+/5  PT notes bulging abdomin secondary to rectus abdominus       Passive Range of Motion   Left Hip   Flexion: 76 degrees   External rotation (90/90): 30 degrees   Internal rotation (90/90): 15 degrees     Right Hip   Flexion: 73 degrees   External rotation (90/90): 28 degrees   Internal rotation (90/90): 15 degrees   Mechanical Assessment    Cervical      Thoracic      Lumbar    Standing flexion: repeated movements   Pain location:peripheralized  Pain intensity: worse  Pain level: increased  Standing extension: repeated movements  Pain location: centralized  Pain intensity: worse  Pain level: increased  Left Sidegliding: repeated movements  Pain location: no change  Right sidegliding: repeated movements  Pain location: no change    Strength/Myotome Testing     Left Hip   Planes of Motion   Flexion: 4  Extension: 5  Abduction: 4+  Adduction: 5  External rotation: 4+  Internal rotation: 4+    Right Hip   Planes of Motion   Flexion: 4+  Extension: 5  Abduction: 5  Adduction: 5  External rotation: 4+  Internal rotation: 4+    Left Knee   Flexion: 5  Extension: 4  Quadriceps contraction: good    Right Knee   Flexion: 5  Extension: 5  Quadriceps contraction: good    Ambulation     Observational Gait   Stride length within functional limits  Decreased walking speed                Precautions:  Bilateral TKR, DM, Diabetic Neuropathy, GERD, Rectus Abdominus

## 2021-01-14 NOTE — LETTER
January 15, 2021    [facility]    Patient: Gilberto Guzman  YOB: 1945   Date of Visit: 2021     Encounter Diagnosis     ICD-10-CM    1  DDD (degenerative disc disease), lumbar  M51 36        Dear Dr Ronna Singh: Thank you for your recent referral of Gilberto Guzman    Please review the attached re-evaluation summary from Sander's recent visit  Please verify that you agree with the plan of care by signing the attached order  If you have any questions or concerns, please do not hesitate to call  I sincerely appreciate the opportunity to share in the care of one of your patients and hope to have another opportunity to work with you in the near future  Sincerely,    Michel Johns PT      Referring Provider:      I certify that I have read the below Plan of Care and certify the need for these services furnished under this plan of treatment while under my care  MD Mat Joseph  Via Fax: [phone]          Daily Note     Today's date: 2021  Patient name: Gilberto Guzman  : 1945  MRN: [id number]  Referring provider: Kelly De La Cruz MD  Dx:   Encounter Diagnosis     ICD-10-CM    1  DDD (degenerative disc disease), lumbar  M51 36                   Subjective: patient continues to c/o low back pain  Objective: See treatment diary below      Assessment: Patient progressing well towards goals  Good execution of exercise with program  Patient demonstrated the appropriate amount of fatigue for program   Will continue to monitor pain levels and progress as able  See Re-Eval for details  Plan: Continue per plan of care  Progress treatment as tolerated         Precautions:  Bilateral TKR, DM, Diabetic Neuropathy, GERD, Rectus Abdominus     Manuals   1/   Bilateral HS, quad, gastroc, piriformis, hip ABD, and manual hip traction  15 min with manual lumbar traction  15 min   Manual performed by PT  15 min 15 min  15 min                              Neuro Re-Ed         Stand OAL  15x3" Hold   Bilat  15x 3" hold  bilat  15x 3" hold  bilat 15x 3" hold  15x 3" hd   Stand Lumbar extension against bar  15x5" Hold  15x 5" hold   15x 5"-10"  hold 15 x 5" 0-10" hold 15x 5" hold   Monster walk  6x10 Feet   Blue  6x 10 feet  Blue   6x 10 feet  Blue  6 x 10 feet BTB  6x 10 ft Blue   Side step and squat          Seated DD Trunk rotation and PNF  2x10 Each   Red PG Ball  2x10  Each  Red MB         Seated DD LAQ and hip march  2x15 Each   1 5#  2x15  Each  1 5#  2x10  Each  1 5#  2 x 10 ea  1 5# 2x10 ea   Bridge with Tball                                              Ther Ex         SRC  10 min L6  10 min L6  10 min L6 10  mins L6  10 min L6   Hip AB/AD machine  2x10   Each   60+1#/AB  45+1#/AD 2x10  Each  60+1# AB  45+1# AD  2x10  60# AB  45# AD 2 x 10   60# AB   45# AD  2x10  60+1# AB  45+1# AD   Leg and calf press  2x10 Each   60#  2x10  Each  60#  2x10  Each  55# 2  x10 ea  55#  2x10 ea  60#   Supine Tball ABD crunch  10x5" 10x 5" hold   10x 5" hold 10x 5" hold  10x 5" hold   TBall LTR 5" hd 10x5" Hold  10x 5" hold   15x 5" hold 15 x 5" hold  15x 5" hd   TBall DKTC 5" hd 10x5" Hold  10x 5" hold   15x 5" hold 15 x 5" hold  15x 5" hd   Open door trunk rotation  10x5" Hold Bilat  10x 5" hold   10x 5" hold with overpressure  10 x5" hold with OP                                         HEP update/review          Ther Activity                           Gait Training                           Modalities         MHP to the LB in seated  15 min    15 min with IFC 15 min IFC  15 min w/ IFC                                                    Attestation signed by Haleigh Valencia PT at 1/14/2021  6:02 PM:  I supervised the visit  We discussed the case to ensure appropriate continuation and progression of care and I reviewed the documentation       PT Re-Evaluation     Today's date: 2021  Patient name: To Melchor  : 1945  MRN: [id number]  Referring provider: Oniel Koenig MD  Dx:   Encounter Diagnosis     ICD-10-CM    1  DDD (degenerative disc disease), lumbar  M51 36                   Assessment  Assessment details: PT notes the patient with continuation of decrease ROM and strength t/o lumbar spine with trunk/core weakness leading to increase pain levels and functional limitations with need for course of skilled therapy for 4 weeks with focus on lumbar stabilization, strengthening, manual therapy, analgesic modalities, and update/review of HEP  Impairments: abnormal or restricted ROM, activity intolerance, impaired physical strength, lacks appropriate home exercise program, pain with function and poor posture   Understanding of Dx/Px/POC: good   Prognosis: good    Goals  ST  Initiate HEP-MET   2  Decrease pain levels by 25-50%-MET   3  Increase strength by 1-2 mm grades-MET  4  Increase ROM by 25-50%-Partial MET   LT  Improve spinal stabilization-Partial MET  2  Decrease limitations with standing, walking, and stairs-Partial MET  3  Decrease limitations with trunk movement, lifting, and carrying-Partial MET  4  Decrease limitations with ADL-Partial MET  5   DC with HEP-Progressing     Plan  Plan details: PT notes review of POC and findings with patient who is in agreement with PT recommendations of continuation of skilled therapy      Patient would benefit from: PT eval and skilled physical therapy  Planned modality interventions: thermotherapy: hydrocollator packs  Planned therapy interventions: manual therapy, neuromuscular re-education, patient education, self care, strengthening, stretching, therapeutic exercise, home exercise program, graded exercise, flexibility and gait training  Frequency: 2x week  Duration in weeks: 4  Treatment plan discussed with: patient        Subjective Evaluation    History of Present Illness  Mechanism of injury: Patient reports bilateral hip and LB pain for several months with insidious onset with progressive worsening over time leading to limitations with walking, standing, stairs, transfers, lifting, carrying, and ADL   Patient reports he went to ortho MD for evaluation and received MRI and x-ray of the lumbar spine with findings of DDD t/o lumbar spine  Patient reports he was referred to OPPT for evaluation and treatment of LBP  Patient reports he is retired with significant PMH of GERD, bilateral TKR, DM, diabetic neuropathy, HTN, and rectus abdominus  Presently the patient has attended 20 sessions of skilled therapy and feels 80-90% improvement since the start of therapy  Patient feels his back is moving better with decrease pain levels but continuation of weakness in the left LE with limitations with walking with need for continuation of skilled therapy  Patient reports limitations with FB of the trunk and lifting of household objects as well as difficulty with assisting/helping his spouse with transfers and ADL  Patient feels he needs more therapy to continue to get stronger in the back and legs so he can help his spouse and keep his pain levels low     Pain  Current pain ratin  At best pain ratin  At worst pain ratin  Location: Lumbar spine and bilateral hips   Quality: radiating, sharp and pulling  Relieving factors: rest  Aggravating factors: stair climbing, walking, standing and lifting  Progression: improved      Diagnostic Tests  X-ray: abnormal  MRI studies: abnormal  Treatments  Current treatment: physical therapy  Patient Goals  Patient goals for therapy: decreased pain, increased motion, increased strength, independence with ADLs/IADLs and return to sport/leisure activities          Objective     Postural Observations  Seated posture: fair  Standing posture: fair    Additional Postural Observation Details  PT notes flattening of the lumbar spine Palpation   Left   Muscle spasm in the erector spinae and lumbar paraspinals  Tenderness of the erector spinae and lumbar paraspinals  Right   Muscle spasm in the iliacus and lumbar paraspinals  Tenderness of the iliacus and lumbar paraspinals  Tenderness     Left Hip   Tenderness in the greater trochanter  Right Hip   Tenderness in the greater trochanter  Neurological Testing     Reflexes   Left   Patellar (L4): normal (2+)  Achilles (S1): normal (2+)    Right   Patellar (L4): normal (2+)  Achilles (S1): normal (2+)    Active Range of Motion     Lumbar   Flexion:  WFL  Extension: 25 degrees   Left lateral flexion:  WFL and with pain  Right lateral flexion:  WFL and with pain  Left rotation: 19 degrees  with pain  Right rotation: 17 degrees  with pain  Left Hip   Flexion: 51 degrees   Abduction: WFL  External rotation (90/90): 25 degrees   Internal rotation (90/90): 12 degrees     Right Hip   Flexion: 59 degrees   Abduction: WFL  External rotation (90/90): 24 degrees   Internal rotation (90/90): 13 degrees   Left Knee   Normal active range of motion    Right Knee   Normal active range of motion    Additional Active Range of Motion Details  PT notes decrease ROM and strength t/o the lumbar spine  PT notes trunk/core weakness with abdominals of 3/5 and lumbar paraspinals of 3+/5  PT notes bulging abdomin secondary to rectus abdominus       Passive Range of Motion   Left Hip   Flexion: 76 degrees   External rotation (90/90): 30 degrees   Internal rotation (90/90): 15 degrees     Right Hip   Flexion: 73 degrees   External rotation (90/90): 28 degrees   Internal rotation (90/90): 15 degrees   Mechanical Assessment    Cervical      Thoracic      Lumbar    Standing flexion: repeated movements   Pain location:peripheralized  Pain intensity: worse  Pain level: increased  Standing extension: repeated movements  Pain location: centralized  Pain intensity: worse  Pain level: increased  Left Sidegliding: repeated movements  Pain location: no change  Right sidegliding: repeated movements  Pain location: no change    Strength/Myotome Testing     Left Hip   Planes of Motion   Flexion: 4  Extension: 5  Abduction: 4+  Adduction: 5  External rotation: 4+  Internal rotation: 4+    Right Hip   Planes of Motion   Flexion: 4+  Extension: 5  Abduction: 5  Adduction: 5  External rotation: 4+  Internal rotation: 4+    Left Knee   Flexion: 5  Extension: 4  Quadriceps contraction: good    Right Knee   Flexion: 5  Extension: 5  Quadriceps contraction: good    Ambulation     Observational Gait   Stride length within functional limits  Decreased walking speed                Precautions:  Bilateral TKR, DM, Diabetic Neuropathy, GERD, Rectus Abdominus

## 2021-01-15 ENCOUNTER — TRANSCRIBE ORDERS (OUTPATIENT)
Dept: PHYSICAL THERAPY | Facility: CLINIC | Age: 76
End: 2021-01-15

## 2021-01-15 DIAGNOSIS — M51.36 DEGENERATION OF LUMBAR INTERVERTEBRAL DISC: Primary | ICD-10-CM

## 2021-01-18 ENCOUNTER — OFFICE VISIT (OUTPATIENT)
Dept: PHYSICAL THERAPY | Facility: CLINIC | Age: 76
End: 2021-01-18
Payer: COMMERCIAL

## 2021-01-18 DIAGNOSIS — N40.0 BENIGN PROSTATIC HYPERPLASIA WITHOUT LOWER URINARY TRACT SYMPTOMS: ICD-10-CM

## 2021-01-18 DIAGNOSIS — M51.36 DDD (DEGENERATIVE DISC DISEASE), LUMBAR: Primary | ICD-10-CM

## 2021-01-18 PROCEDURE — 97112 NEUROMUSCULAR REEDUCATION: CPT | Performed by: PHYSICAL THERAPIST

## 2021-01-18 PROCEDURE — 97110 THERAPEUTIC EXERCISES: CPT | Performed by: PHYSICAL THERAPIST

## 2021-01-18 PROCEDURE — 97140 MANUAL THERAPY 1/> REGIONS: CPT | Performed by: PHYSICAL THERAPIST

## 2021-01-18 RX ORDER — TAMSULOSIN HYDROCHLORIDE 0.4 MG/1
CAPSULE ORAL
Qty: 90 CAPSULE | Refills: 0 | Status: SHIPPED | OUTPATIENT
Start: 2021-01-18 | End: 2021-01-20

## 2021-01-18 NOTE — PROGRESS NOTES
Daily Note     Today's date: 2021  Patient name: Keke Hsu  : 1945  MRN: [id number]  Referring provider: Kevin Vera MD  Dx:   Encounter Diagnosis     ICD-10-CM    1  DDD (degenerative disc disease), lumbar  M51 36                   Subjective:  Patient reports inactivity over the weekend leading to increase stiffness/tightness in the LB  Patient reports 3/10 level at the start of the session  Post session, the patient reports feeling better with decrease tightness in the low back to 1/10 level  Objective: See treatment diary below      Assessment: Tolerated treatment well  PT notes continuation of progression of POC with focus on lumbar stabilization and manual therapy to decrease pain levels and improve functional limitations to meet therapy goals  PT notes continuation of decrease ROM and strength t/o the lumbar spine with need for continuation of skilled therapy  Plan: Continue per plan of care        Precautions:  Bilateral TKR, DM, Diabetic Neuropathy, GERD, Rectus Abdominus     Manuals       Bilateral HS, quad, gastroc, piriformis, hip ABD, and manual hip traction  15 min with manual lumbar traction  15 min   Manual performed by PT 15 min with manual lumbar traction                                  Neuro Re-Ed         Stand OAL  15x3" Hold   Bilat  15x 3" hold  bilat 15x3" Hold   Bilat       Stand Lumbar extension against bar  15x5" Hold  15x 5" hold  15x5" Hold       Monster walk  6x10 Feet   Blue  6x 10 feet  Blue  NT      Side step and squat          Seated DD Trunk rotation and PNF  2x10 Each   Red PG Ball  2x10  Each  Red MB   10x Each  Yellow MB       Seated DD LAQ and hip march  2x15 Each   1 5#  2x15  Each  1 5# 2x15 Bilat  1 5#       Bridge with Tball                                              Ther Ex         SRC  10 min L6  10 min L6 10 min L6       Hip AB/AD machine  2x10   Each   60+1#/AB  45+1#/AD 2x10  Each  60+1# AB  45+1# AD 2x10 Each   60+1#/AB  45+1#/AD      Leg and calf press  2x10 Each   60#  2x10  Each  60# 2x10 Each   65#       Supine Tball ABD crunch  10x5" 10x 5" hold  15x5" Hold       TBall LTR 5" hd 10x5" Hold  10x 5" hold  10x5" Hold   Bilat       TBall DKTC 5" hd 10x5" Hold  10x 5" hold  10x5" Hold       Open door trunk rotation   10x5" Hold Bilat  10x 5" hold  5x10" Hold   Bilat                                           HEP update/review          Ther Activity                           Gait Training                           Modalities         MHP to the LB in seated  15 min  15 min  15 min with IFC

## 2021-01-19 RX ORDER — FINASTERIDE 5 MG/1
TABLET, FILM COATED ORAL
Qty: 90 TABLET | Refills: 1 | Status: SHIPPED | OUTPATIENT
Start: 2021-01-19 | End: 2021-05-21 | Stop reason: SDUPTHER

## 2021-01-20 ENCOUNTER — OFFICE VISIT (OUTPATIENT)
Dept: INTERNAL MEDICINE CLINIC | Facility: CLINIC | Age: 76
End: 2021-01-20
Payer: COMMERCIAL

## 2021-01-20 VITALS
WEIGHT: 263 LBS | SYSTOLIC BLOOD PRESSURE: 140 MMHG | BODY MASS INDEX: 32.03 KG/M2 | TEMPERATURE: 97.4 F | HEIGHT: 76 IN | HEART RATE: 88 BPM | OXYGEN SATURATION: 96 % | RESPIRATION RATE: 18 BRPM | DIASTOLIC BLOOD PRESSURE: 68 MMHG

## 2021-01-20 DIAGNOSIS — I10 ESSENTIAL HYPERTENSION: Primary | ICD-10-CM

## 2021-01-20 DIAGNOSIS — N32.81 OAB (OVERACTIVE BLADDER): ICD-10-CM

## 2021-01-20 LAB
SL AMB  POCT GLUCOSE, UA: ABNORMAL
SL AMB LEUKOCYTE ESTERASE,UA: NEGATIVE
SL AMB POCT BILIRUBIN,UA: NEGATIVE
SL AMB POCT BLOOD,UA: NEGATIVE
SL AMB POCT CLARITY,UA: CLEAR
SL AMB POCT COLOR,UA: YELLOW
SL AMB POCT KETONES,UA: NEGATIVE
SL AMB POCT NITRITE,UA: NEGATIVE
SL AMB POCT PH,UA: 6
SL AMB POCT SPECIFIC GRAVITY,UA: 1.03
SL AMB POCT URINE PROTEIN: NEGATIVE
SL AMB POCT UROBILINOGEN: NORMAL

## 2021-01-20 PROCEDURE — 4010F ACE/ARB THERAPY RXD/TAKEN: CPT | Performed by: INTERNAL MEDICINE

## 2021-01-20 PROCEDURE — 3008F BODY MASS INDEX DOCD: CPT | Performed by: INTERNAL MEDICINE

## 2021-01-20 PROCEDURE — 99213 OFFICE O/P EST LOW 20 MIN: CPT | Performed by: INTERNAL MEDICINE

## 2021-01-20 PROCEDURE — 81002 URINALYSIS NONAUTO W/O SCOPE: CPT | Performed by: INTERNAL MEDICINE

## 2021-01-20 PROCEDURE — 1160F RVW MEDS BY RX/DR IN RCRD: CPT | Performed by: INTERNAL MEDICINE

## 2021-01-20 RX ORDER — LISINOPRIL 20 MG/1
20 TABLET ORAL DAILY
Start: 2021-01-20

## 2021-01-20 RX ORDER — SOLIFENACIN SUCCINATE 10 MG/1
10 TABLET, FILM COATED ORAL DAILY
Qty: 90 TABLET | Refills: 1 | Status: SHIPPED | OUTPATIENT
Start: 2021-01-20

## 2021-01-20 NOTE — PATIENT INSTRUCTIONS
Chronic Hypertension   AMBULATORY CARE:   Hypertension  is high blood pressure  Your blood pressure is the force of your blood moving against the walls of your arteries  Hypertension causes your blood pressure to get so high that your heart has to work much harder than normal  This can damage your heart  Even if you have hypertension for years, lifestyle changes, medicines, or both can help bring your blood pressure to normal   Call 911 for any of the following:   · You have chest pain  · You have any of the following signs of a heart attack:      ? Squeezing, pressure, or pain in your chest    ? You may  also have any of the following:     § Discomfort or pain in your back, neck, jaw, stomach, or arm    § Shortness of breath    § Nausea or vomiting    § Lightheadedness or a sudden cold sweat    · You become confused or have difficulty speaking  · You suddenly feel lightheaded or have trouble breathing  Seek care immediately if:   · You have a severe headache or vision loss  · You have weakness in an arm or leg  Contact your healthcare provider if:   · You feel faint, dizzy, confused, or drowsy  · You have been taking your blood pressure medicine but your pressure is higher than your provider says it should be  · You have questions or concerns about your condition or care  Treatment for chronic hypertension  may include medicine to lower your blood pressure and cholesterol levels  A low cholesterol level helps prevent heart disease and makes it easier to control your blood pressure  Heart disease can make your blood pressure harder to control  You may also need to make lifestyle changes  What you need to know about the stages of hypertension:       · Normal blood pressure is 119/79 or lower   Your healthcare provider may only check your blood pressure each year if it stays at a normal level  · Elevated blood pressure is 120/79 to 129/79   This is sometimes called prehypertension   Your healthcare provider may suggest lifestyle changes to help lower your blood pressure to a normal level  He or she may then check it again in 3 to 6 months  · Stage 1 hypertension is 130/80  to 139/89   Your provider may recommend lifestyle changes, medication, and checks every 3 to 6 months until your blood pressure is controlled  · Stage 2 hypertension is 140/90 or higher   Your provider will recommend lifestyle changes and have you take 2 kinds of hypertension medicines  You will also need to have your blood pressure checked monthly until it is controlled  Manage chronic hypertension:   · Check your blood pressure at home  Avoid smoking, caffeine, and exercise at least 30 minutes before checking your blood pressure  Sit and rest for 5 minutes before you take your blood pressure  Extend your arm and support it on a flat surface  Your arm should be at the same level as your heart  Follow the directions that came with your blood pressure monitor  Check your blood pressure 2 times, 1 minute apart, before you take your medicine in the morning  Also check your blood pressure before your evening meal  Keep a record of your readings and bring it to your follow-up visits  Ask your healthcare provider what your blood pressure should be  · Manage any other health conditions you have  Health conditions such as diabetes can increase your risk for hypertension  Follow your healthcare provider's instructions and take all your medicines as directed  Talk to your healthcare provider about any new health conditions you have recently developed  · Ask about all medicines  Certain medicines can increase your blood pressure  Examples include oral birth control pills, decongestants, herbal supplements, and NSAIDs, such as ibuprofen  Your healthcare provider can tell you which medicines are safe for you to take  This includes prescription and over-the-counter medicines      Lifestyle changes you can make to lower your blood pressure: Your provider may want you to make more lifestyle changes if you are having trouble controlling your blood pressure  This may feel difficult over time, especially if you think you are making good changes but your pressure is still high  It might help to focus on one new change at a time  For example, try to add 1 more day of exercise, or exercise for an extra 10 minutes on 2 days  Small changes can make a big difference  Your healthcare provider can also refer you to specialists such as a dietitian who can help you make small changes  · Limit sodium (salt) as directed  Too much sodium can affect your fluid balance  Check labels to find low-sodium or no-salt-added foods  Some low-sodium foods use potassium salts for flavor  Too much potassium can also cause health problems  Your healthcare provider will tell you how much sodium and potassium are safe for you to have in a day  He or she may recommend that you limit sodium to 2,300 mg a day  · Follow the meal plan recommended by your healthcare provider  A dietitian or your provider can give you more information on low-sodium plans or the DASH (Dietary Approaches to Stop Hypertension) eating plan  The DASH plan is low in sodium, unhealthy fats, and total fat  It is high in potassium, calcium, and fiber  · Exercise to maintain a healthy weight  Exercise at least 30 minutes per day, on most days of the week  This will help decrease your blood pressure  Ask your healthcare provider about the best exercise plan for you  · Decrease stress  This may help lower your blood pressure  Learn ways to relax, such as deep breathing or listening to music  · Limit alcohol as directed  Alcohol can increase your blood pressure  A drink of alcohol is 12 ounces of beer, 5 ounces of wine, or 1½ ounces of liquor  · Do not smoke    Nicotine and other chemicals in cigarettes and cigars can increase your blood pressure and also cause lung damage  Ask your healthcare provider for information if you currently smoke and need help to quit  E-cigarettes or smokeless tobacco still contain nicotine  Talk to your healthcare provider before you use these products  Follow up with your healthcare provider as directed: You will need to return to have your blood pressure checked and to have other lab tests done  Write down your questions so you remember to ask them during your visits  © Copyright [address] Information is for End User's use only and may not be sold, redistributed or otherwise used for commercial purposes  All illustrations and images included in CareNotes® are the copyrighted property of [employer]  or [employer]   The above information is an  only  It is not intended as medical advice for individual conditions or treatments  Talk to your doctor, nurse or pharmacist before following any medical regimen to see if it is safe and effective for you  Obesity   AMBULATORY CARE:   Obesity  is when your body mass index (BMI) is greater than 30  Your healthcare provider will use your height and weight to measure your BMI  The risks of obesity include  many health problems, such as injuries or physical disability  You may need tests to check for the following:  · Diabetes    · High blood pressure or high cholesterol    · Heart disease    · Gallbladder or liver disease    · Cancer of the colon, breast, prostate, liver, or kidney    · Sleep apnea    · Arthritis or gout    Seek care immediately if:   · You have a severe headache, confusion, or difficulty speaking  · You have weakness on one side of your body  · You have chest pain, sweating, or shortness of breath  Contact your healthcare provider if:   · You have symptoms of gallbladder or liver disease, such as pain in your upper abdomen  · You have knee or hip pain and discomfort while walking      · You have symptoms of diabetes, such as intense hunger and thirst, and frequent urination  · You have symptoms of sleep apnea, such as snoring or daytime sleepiness  · You have questions or concerns about your condition or care  Treatment for obesity  focuses on helping you lose weight to improve your health  Even a small decrease in BMI can reduce the risk for many health problems  Your healthcare provider will help you set a weight-loss goal   · Lifestyle changes  are the first step in treating obesity  These include making healthy food choices and getting regular physical activity  Your healthcare provider may suggest a weight-loss program that involves coaching, education, and therapy  · Medicine  may help you lose weight when it is used with a healthy diet and physical activity  · Surgery  can help you lose weight if you are very obese and have other health problems  There are several types of weight-loss surgery  Ask your healthcare provider for more information  Be successful losing weight:   · Set small, realistic goals  An example of a small goal is to walk for 20 minutes 5 days a week  Anther goal is to lose 5% of your body weight  · Tell friends, family members, and coworkers about your goals  and ask for their support  Ask a friend to lose weight with you, or join a weight-loss support group  · Identify foods or triggers that may cause you to overeat , and find ways to avoid them  Remove tempting high-calorie foods from your home and workplace  Place a bowl of fresh fruit on your kitchen counter  If stress causes you to eat, then find other ways to cope with stress  · Keep a diary to track what you eat and drink  Also write down how many minutes of physical activity you do each day  Weigh yourself once a week and record it in your diary  Eating changes: You will need to eat 500 to 1,000 fewer calories each day than you currently eat to lose 1 to 2 pounds a week   The following changes will help you cut calories:  · Eat smaller portions  Use small plates, no larger than 9 inches in diameter  Fill your plate half full of fruits and vegetables  Measure your food using measuring cups until you know what a serving size looks like  · Eat 3 meals and 1 or 2 snacks each day  Plan your meals in advance  Fred Frias and eat at home most of the time  Eat slowly  Do not skip meals  Skipping meals can lead to overeating later in the day  This can make it harder for you to lose weight  Talk with a dietitian to help you make a meal plan and schedule that is right for you  · Eat fruits and vegetables at every meal   They are low in calories and high in fiber, which makes you feel full  Do not add butter, margarine, or cream sauce to vegetables  Use herbs to season steamed vegetables  · Eat less fat and fewer fried foods  Eat more baked or grilled chicken and fish  These protein sources are lower in calories and fat than red meat  Limit fast food  Dress your salads with olive oil and vinegar instead of bottled dressing  · Limit the amount of sugar you eat  Do not drink sugary beverages  Limit alcohol  Activity changes:  Physical activity is good for your body in many ways  It helps you burn calories and build strong muscles  It decreases stress and depression, and improves your mood  It can also help you sleep better  Talk to your healthcare provider before you begin an exercise program   · Exercise for at least 30 minutes 5 days a week  Start slowly  Set aside time each day for physical activity that you enjoy and that is convenient for you  It is best to do both weight training and an activity that increases your heart rate, such as walking, bicycling, or swimming  · Find ways to be more active  Do yard work and housecleaning  Walk up the stairs instead of using elevators  Spend your leisure time going to events that require walking, such as outdoor festivals or fairs   This extra physical activity can help you lose weight and keep it off  Follow up with your healthcare provider as directed: You may need to meet with a dietitian  Write down your questions so you remember to ask them during your visits  © Copyright [address] Information is for End User's use only and may not be sold, redistributed or otherwise used for commercial purposes  All illustrations and images included in CareNotes® are the copyrighted property of [employer]  or [facility] Phyllis Yung   The above information is an  only  It is not intended as medical advice for individual conditions or treatments  Talk to your doctor, nurse or pharmacist before following any medical regimen to see if it is safe and effective for you  Low Fat Diet   AMBULATORY CARE:   A low-fat diet  is an eating plan that is low in total fat, unhealthy fat, and cholesterol  You may need to follow a low-fat diet if you have trouble digesting or absorbing fat  You may also need to follow this diet if you have high cholesterol  You can also lower your cholesterol by increasing the amount of fiber in your diet  Soluble fiber is a type of fiber that helps to decrease cholesterol levels  Different types of fat in food:   · Limit unhealthy fats  A diet that is high in cholesterol, saturated fat, and trans fat may cause unhealthy cholesterol levels  Unhealthy cholesterol levels increase your risk of heart disease  ? Cholesterol:  Limit intake of cholesterol to less than 200 mg per day  Cholesterol is found in meat, eggs, and dairy  ? Saturated fat:  Limit saturated fat to less than 7% of your total daily calories  Ask your dietitian how many calories you need each day  Saturated fat is found in butter, cheese, ice cream, whole milk, and palm oil  Saturated fat is also found in meat, such as beef, pork, chicken skin, and processed meats  Processed meats include sausage, hot dogs, and bologna  ? Trans fat:  Avoid trans fat as much as possible   Trans fat is used in fried and baked foods  Foods that say trans fat free on the label may still have up to 0 5 grams of trans fat per serving  · Include healthy fats  Replace foods that are high in saturated and trans fat with foods high in healthy fats  This may help to decrease high cholesterol levels  ? Monounsaturated fats: These are found in avocados, nuts, and vegetable oils, such as olive, canola, and sunflower oil  ? Polyunsaturated fats: These can be found in vegetable oils, such as soybean or corn oil  Omega-3 fats can help to decrease the risk of heart disease  Omega-3 fats are found in fish, such as salmon, herring, trout, and tuna  Omega-3 fats can also be found in plant foods, such as walnuts, flaxseed, soybeans, and canola oil  Foods to limit or avoid:   · Grains:      ? Snacks that are made with partially hydrogenated oils, such as chips, regular crackers, and butter-flavored popcorn    ? High-fat baked goods, such as biscuits, croissants, doughnuts, pies, cookies, and pastries    · Dairy:      ? Whole milk, 2% milk, and yogurt and ice cream made with whole milk    ? Half and half creamer, heavy cream, and whipping cream    ? Cheese, cream cheese, and sour cream    · Meats and proteins:      ? High-fat cuts of meat (T-bone steak, regular hamburger, and ribs)    ? Fried meat, poultry (turkey and chicken), and fish    ? Poultry (chicken and turkey) with skin    ? Cold cuts (salami or bologna), hot dogs, healy, and sausage    ? Whole eggs and egg yolks    · Vegetables and fruits with added fat:      ? Fried vegetables or vegetables in butter or high-fat sauces, such as cream or cheese sauces    ? Fried fruit or fruit served with butter or cream    · Fats:      ? Butter, stick margarine, and shortening    ? Coconut, palm oil, and palm kernel oil    Foods to include:   · Grains:      ? Whole-grain breads, cereals, pasta, and brown rice    ?  Low-fat crackers and pretzels    · Vegetables and fruits:      ? Fresh, frozen, or canned vegetables (no salt or low-sodium)    ? Fresh, frozen, dried, or canned fruit (canned in light syrup or fruit juice)    ? Avocado    · Low-fat dairy products:      ? Nonfat (skim) or 1% milk    ? Nonfat or low-fat cheese, yogurt, and cottage cheese    · Meats and proteins:      ? Chicken or turkey with no skin    ? Baked or broiled fish    ? Lean beef and pork (loin, round, extra lean hamburger)    ? Beans and peas, unsalted nuts, soy products    ? Egg whites and substitutes    ? Seeds and nuts    · Fats:      ? Unsaturated oil, such as canola, olive, peanut, soybean, or sunflower oil    ? Soft or liquid margarine and vegetable oil spread    ? Low-fat salad dressing    Other ways to decrease fat:   · Read food labels before you buy foods  Choose foods that have less than 30% of calories from fat  Choose low-fat or fat-free dairy products  Remember that fat free does not mean calorie free  These foods still contain calories, and too many calories can lead to weight gain  · Trim fat from meat and avoid fried food  Trim all visible fat from meat before you cook it  Remove the skin from poultry  Do not mabry meat, fish, or poultry  Bake, roast, boil, or broil these foods instead  Avoid fried foods  Eat a baked potato instead of [place] Juanita fries  Steam vegetables instead of sautéing them in butter  · Add less fat to foods  Use imitation healy bits on salads and baked potatoes instead of regular healy bits  Use fat-free or low-fat salad dressings instead of regular dressings  Use low-fat or nonfat butter-flavored topping instead of regular butter or margarine on popcorn and other foods  Ways to decrease fat in recipes:  Replace high-fat ingredients with low-fat or nonfat ones  This may cause baked goods to be drier than usual  You may need to use nonfat cooking spray on pans to prevent food from sticking   You also may need to change the amount of other ingredients, such as water, in the recipe  Try the following:  · Use low-fat or light margarine instead of regular margarine or shortening  · Use lean ground turkey breast or chicken, or lean ground beef (less than 5% fat) instead of hamburger  · Add 1 teaspoon of canola oil to 8 ounces of skim milk instead of using cream or half and half  · Use grated zucchini, carrots, or apples in breads instead of coconut  · Use blenderized, low-fat cottage cheese, plain tofu, or low-fat ricotta cheese instead of cream cheese  · Use 1 egg white and 1 teaspoon of canola oil, or use ¼ cup (2 ounces) of fat-free egg substitute instead of a whole egg  · Replace half of the oil that is called for in a recipe with applesauce when you bake  Use 3 tablespoons of cocoa powder and 1 tablespoon of canola oil instead of a square of baking chocolate  How to increase fiber:  Eat enough high-fiber foods to get 20 to 30 grams of fiber every day  Slowly increase your fiber intake to avoid stomach cramps, gas, and other problems  · Eat 3 ounces of whole-grain foods each day  An ounce is about 1 slice of bread  Eat whole-grain breads, such as whole-wheat bread  Whole wheat, whole-wheat flour, or other whole grains should be listed as the first ingredient on the food label  Replace white flour with whole-grain flour or use half of each in recipes  Whole-grain flour is heavier than white flour, so you may have to add more yeast or baking powder  · Eat a high-fiber cereal for breakfast   Oatmeal is a good source of soluble fiber  Look for cereals that have bran or fiber in the name  Choose whole-grain products, such as brown rice, barley, and whole-wheat pasta  · Eat more beans, peas, and lentils  For example, add beans to soups or salads  Eat at least 5 cups of fruits and vegetables each day  Eat fruits and vegetables with the peel because the peel is high in fiber      © Copyright [employer] 2020 Information is for End User's use only and may not be sold, redistributed or otherwise used for commercial purposes  All illustrations and images included in CareNotes® are the copyrighted property of [employer]  or Julia Tello  The above information is an  only  It is not intended as medical advice for individual conditions or treatments  Talk to your doctor, nurse or pharmacist before following any medical regimen to see if it is safe and effective for you  Heart Healthy Diet   AMBULATORY CARE:   A heart healthy diet  is an eating plan low in unhealthy fats and sodium (salt)  The plan is high in healthy fats and fiber  A heart healthy diet helps improve your cholesterol levels and lowers your risk for heart disease and stroke  A dietitian will teach you how to read and understand food labels  Heart healthy diet guidelines to follow:   · Choose foods that contain healthy fats  ? Unsaturated fats  include monounsaturated and polyunsaturated fats  Unsaturated fat is found in foods such as soybean, canola, olive, corn, and safflower oils  It is also found in soft tub margarine that is made with liquid vegetable oil  ? Omega-3 fat  is found in certain fish, such as salmon, tuna, and trout, and in walnuts and flaxseed  Eat fish high in omega-3 fats at least 2 times a week  · Get 20 to 30 grams of fiber each day  Fruits, vegetables, whole-grain foods, and legumes (cooked beans) are good sources of fiber  · Limit or do not have unhealthy fats  ? Cholesterol  is found in animal foods, such as eggs and lobster, and in dairy products made from whole milk  Limit cholesterol to less than 200 mg each day  ? Saturated fat  is found in meats, such as healy and hamburger  It is also found in chicken or turkey skin, whole milk, and butter  Limit saturated fat to less than 7% of your total daily calories  ? Trans fat  is found in packaged foods, such as potato chips and cookies   It is also in hard margarine, some fried foods, and shortening  Do not eat foods that contain trans fats  · Limit sodium as directed  You may be told to limit sodium to 2,000 to 2,300 mg each day  Choose low-sodium or no-salt-added foods  Add little or no salt to food you prepare  Use herbs and spices in place of salt  Include the following in your heart healthy plan:  Ask your dietitian or healthcare provider how many servings to have from each of the following food groups:  · Grains:      ? Whole-wheat breads, cereals, and pastas, and brown rice    ? Low-fat, low-sodium crackers and chips    · Vegetables:      ? Broccoli, green beans, green peas, and spinach    ? Collards, kale, and lima beans    ? Carrots, sweet potatoes, tomatoes, and peppers    ? Canned vegetables with no salt added    · Fruits:      ? Bananas, peaches, pears, and pineapple    ? Grapes, raisins, and dates    ? Oranges, tangerines, grapefruit, orange juice, and grapefruit juice    ? Apricots, mangoes, melons, and papaya    ? Raspberries and strawberries    ? Canned fruit with no added sugar    · Low-fat dairy:      ? Nonfat (skim) milk, 1% milk, and low-fat almond, cashew, or soy milks fortified with calcium    ? Low-fat cheese, regular or frozen yogurt, and cottage cheese    · Meats and proteins:      ? Lean cuts of beef and pork (loin, leg, round), skinless chicken and turkey    ? Legumes, soy products, egg whites, or nuts    Limit or do not include the following in your heart healthy plan:   · Unhealthy fats and oils:      ? Whole or 2% milk, cream cheese, sour cream, or cheese    ? High-fat cuts of beef (T-bone steaks, ribs), chicken or turkey with skin, and organ meats such as liver    ?  Butter, stick margarine, shortening, and cooking oils such as coconut or palm oil    · Foods and liquids high in sodium:      ? Packaged foods, such as frozen dinners, cookies, macaroni and cheese, and cereals with more than 300 mg of sodium per serving    ? Vegetables with added sodium, such as instant potatoes, vegetables with added sauces, or regular canned vegetables    ? Cured or smoked meats, such as hot dogs, healy, and sausage    ? High-sodium ketchup, barbecue sauce, salad dressing, pickles, olives, soy sauce, or miso    · Foods and liquids high in sugar:      ? Candy, cake, cookies, pies, or doughnuts    ? Soft drinks (soda), sports drinks, or sweetened tea    ? Canned or dry mixes for cakes, soups, sauces, or gravies    Other healthy heart guidelines:   · Do not smoke  Nicotine and other chemicals in cigarettes and cigars can cause lung and heart damage  Ask your healthcare provider for information if you currently smoke and need help to quit  E-cigarettes or smokeless tobacco still contain nicotine  Talk to your healthcare provider before you use these products  · Limit or do not drink alcohol as directed  Alcohol can damage your heart and raise your blood pressure  Your healthcare provider may give you specific daily and weekly limits  The general recommended limit is 1 drink a day for women 21 or older and for men 72 or older  Do not have more than 3 drinks in a day or 7 in a week  The recommended limit is 2 drinks a day for men 24to 59years of age  Do not have more than 4 drinks in a day or 14 in a week  A drink of alcohol is 12 ounces of beer, 5 ounces of wine, or 1½ ounces of liquor  · Exercise regularly  Exercise can help you maintain a healthy weight and improve your blood pressure and cholesterol levels  Regular exercise can also decrease your risk for heart problems  Ask your healthcare provider about the best exercise plan for you  Do not start an exercise program without asking your healthcare provider  Follow up with your doctor or cardiologist as directed:  Write down your questions so you remember to ask them during your visits    © Copyright [employer] 2020 Information is for End User's use only and may not be sold, redistributed or otherwise used for commercial purposes  All illustrations and images included in CareNotes® are the copyrighted property of [employer]  or Julia Tello  The above information is an  only  It is not intended as medical advice for individual conditions or treatments  Talk to your doctor, nurse or pharmacist before following any medical regimen to see if it is safe and effective for you

## 2021-01-20 NOTE — PROGRESS NOTES
BMI Counseling: Body mass index is 32 01 kg/m²  The BMI is above normal  Nutrition recommendations include decreasing portion sizes and encouraging healthy choices of fruits and vegetables  Exercise recommendations include moderate physical activity 150 minutes/week  No pharmacotherapy was ordered  Assessment/Plan:  Problem List Items Addressed This Visit        Cardiovascular and Mediastinum    Hypertension - Primary    Relevant Medications    lisinopril (ZESTRIL) 20 mg tablet      Other Visit Diagnoses     OAB (overactive bladder)        Relevant Medications    solifenacin (VESICARE) 10 MG tablet    Other Relevant Orders    POCT urine dip           Diagnoses and all orders for this visit:    Essential hypertension  -     lisinopril (ZESTRIL) 20 mg tablet; Take 1 tablet (20 mg total) by mouth daily    OAB (overactive bladder)  -     POCT urine dip  -     solifenacin (VESICARE) 10 MG tablet; Take 1 tablet (10 mg total) by mouth daily        No problem-specific Assessment & Plan notes found for this encounter  A/P: Doing well, but BP is still up given his DM  Will increase the lisinopril to 20mg q day  Urine dip was negative  Urinary s/s seem more like OAB than BPH at this time  Will try vesicare if covered  RTC two months for routine, but will call in several weeks for an update  Subjective:      Patient ID: Mihir Resendiz  is a 70 y o  male  WM RTC for f/u uncontrolled HTN given his DM  Deferred med adjustment  OP readings mostly with sBP greater than 130  Now off flomax and urine stream is strong, but now with urgency, but no dysuria or incontinence  The following portions of the patient's history were reviewed and updated as appropriate:   He has a past medical history of Arthritis, Chronic kidney disease, Diabetes mellitus ([facility] [address] ), Eustachian tube dysfunction, Gastroesophageal reflux disease without esophagitis (4/11/2018), Hypertension, Obesity, and Scrotal bleeding  ,  does not have any pertinent problems on file  ,   has a past surgical history that includes Cholecystectomy; Tumor removal; Total knee arthroplasty (Left); Total knee arthroplasty (Right); and Umbilical hernia repair  ,  family history includes No Known Problems in his mother  ,   reports that he has quit smoking  He has never used smokeless tobacco  He reports current alcohol use  He reports that he does not use drugs  ,  is allergic to sulfa antibiotics     Current Outpatient Medications   Medication Sig Dispense Refill    finasteride (PROSCAR) 5 mg tablet take 1 tablet by mouth once daily 90 tablet 1    glimepiride (AMARYL) 4 mg tablet Half a tab po q AM and one full pill po q  tablet 0    glucose blood (FREESTYLE TEST STRIPS) test strip 1 each by Other route 4 (four) times a day 400 each 1    lisinopril (ZESTRIL) 20 mg tablet Take 1 tablet (20 mg total) by mouth daily      metFORMIN (GLUCOPHAGE) 1000 MG tablet TAKE 1 TABLET IN THE       MORNING, 1/2 TABLET AT     NOON, AND 1 TABLET IN THE  EVENING 225 tablet 3    olopatadine (PATANOL) 0 1 % ophthalmic solution Apply 1 drop to eye 2 (two) times a day 5 mL 5    omeprazole (PriLOSEC) 20 mg delayed release capsule Take 20 mg by mouth 2 (two) times a day  0    solifenacin (VESICARE) 10 MG tablet Take 1 tablet (10 mg total) by mouth daily 90 tablet 1     No current facility-administered medications for this visit  Review of Systems   Constitutional: Negative for activity change, chills, diaphoresis, fatigue and fever  Respiratory: Negative for cough, chest tightness, shortness of breath and wheezing  Cardiovascular: Negative for chest pain, palpitations and leg swelling  Gastrointestinal: Negative for abdominal pain, constipation, diarrhea, nausea and vomiting  Genitourinary: Positive for frequency and urgency  Negative for decreased urine volume, difficulty urinating, discharge, dysuria, hematuria and penile pain     Musculoskeletal: Negative for arthralgias, gait problem and myalgias  Neurological: Negative for light-headedness and headaches  Psychiatric/Behavioral: Negative for confusion  The patient is not nervous/anxious  PHQ-9 Depression Screening    PHQ-9:   Frequency of the following problems over the past two weeks:            Objective:  Vitals:    01/20/21 1539   BP: 140/68   BP Location: Right arm   Patient Position: Sitting   Cuff Size: Adult   Pulse: 88   Resp: 18   Temp: (!) 97 4 °F (36 3 °C)   TempSrc: Temporal   SpO2: 96%   Weight: 119 kg (263 lb)   Height: 6' 4" (1 93 m)     Body mass index is 32 01 kg/m²  Physical Exam  Vitals signs and nursing note reviewed  Constitutional:       General: He is not in acute distress  Appearance: Normal appearance  He is not ill-appearing  HENT:      Head: Normocephalic and atraumatic  Mouth/Throat:      Mouth: Mucous membranes are moist    Eyes:      Extraocular Movements: Extraocular movements intact  Conjunctiva/sclera: Conjunctivae normal       Pupils: Pupils are equal, round, and reactive to light  Cardiovascular:      Rate and Rhythm: Normal rate and regular rhythm  Heart sounds: Normal heart sounds  Pulmonary:      Effort: Pulmonary effort is normal  No respiratory distress  Breath sounds: Normal breath sounds  No wheezing or rales  Neurological:      General: No focal deficit present  Mental Status: He is alert and oriented to person, place, and time  Mental status is at baseline  Psychiatric:         Mood and Affect: Mood normal          Behavior: Behavior normal          Thought Content: Thought content normal          Judgment: Judgment normal          BMI Counseling: Body mass index is 32 01 kg/m²  The BMI is above normal  Nutrition recommendations include reducing portion sizes, decreasing overall calorie intake, reducing intake of saturated fat and trans fat and reducing intake of cholesterol   Exercise recommendations include moderate aerobic physical activity for 150 minutes/week

## 2021-01-21 ENCOUNTER — OFFICE VISIT (OUTPATIENT)
Dept: PHYSICAL THERAPY | Facility: CLINIC | Age: 76
End: 2021-01-21
Payer: COMMERCIAL

## 2021-01-21 DIAGNOSIS — M51.36 DDD (DEGENERATIVE DISC DISEASE), LUMBAR: Primary | ICD-10-CM

## 2021-01-21 PROCEDURE — 97140 MANUAL THERAPY 1/> REGIONS: CPT

## 2021-01-21 PROCEDURE — 97110 THERAPEUTIC EXERCISES: CPT

## 2021-01-21 NOTE — PROGRESS NOTES
Daily Note     Today's date: 2021  Patient name: Mihir Resendiz  : 1945  MRN: [id number]  Referring provider: Eugenia Bull MD  Dx:   Encounter Diagnosis     ICD-10-CM    1  DDD (degenerative disc disease), lumbar  M51 36                   Subjective: patient acknowledged understanding HEP      Objective: See treatment diary below      Assessment: Patient has progressed well towards goals  Issued and educated patient with HEP with good understanding  Patient would benefit from transition to wellness program  Patient to be discharged from therapy services today         Plan: patient to be D/C from therapy services today     Precautions:  Bilateral TKR, DM, Diabetic Neuropathy, GERD, Rectus Abdominus     Manuals      Bilateral HS, quad, gastroc, piriformis, hip ABD, and manual hip traction  15 min with manual lumbar traction  15 min   Manual performed by PT 15 min with manual lumbar traction  15 min with manual lumbar traction                                Neuro Re-Ed         Stand OAL  15x3" Hold   Bilat  15x 3" hold  bilat 15x3" Hold   Bilat  15x 3" hold  bilat      Stand Lumbar extension against bar  15x5" Hold  15x 5" hold  15x5" Hold  15x 5" hold      Monster walk  6x10 Feet   Blue  6x 10 feet  Blue  NT      Side step and squat          Seated DD Trunk rotation and PNF  2x10 Each   Red PG Ball  2x10  Each  Red MB   10x Each  Yellow MB  10x each  Yellow MB     Seated DD LAQ and hip march  2x15 Each   1 5#  2x15  Each  1 5# 2x15 Bilat  1 5#  2x15 bilat  1 5#     Bridge with Tball                                              Ther Ex         SRC  10 min L6  10 min L6 10 min L6  10 min L6      Hip AB/AD machine  2x10   Each   60+1#/AB  45+1#/AD 2x10  Each  60+1# AB  45+1# AD 2x10   Each   60+1#/AB  45+1#/AD 2x10  Each  60+1# AB  45+1# AD     Leg and calf press  2x10 Each   60#  2x10  Each  60# 2x10 Each   65#  2x10 each  65#     Supine Tball ABD crunch  10x5" 10x 5" hold  15x5" Hold       TBall LTR 5" hd 10x5" Hold  10x 5" hold  10x5" Hold   Bilat       TBall DKTC 5" hd 10x5" Hold  10x 5" hold  10x5" Hold       Open door trunk rotation   10x5" Hold Bilat  10x 5" hold  5x10" Hold   Bilat                                           HEP update/review     15 min     Ther Activity                           Gait Training                           Modalities         MHP to the LB in seated  15 min  15 min  15 min with IFC  15 min with IFC

## 2021-01-21 NOTE — PROGRESS NOTES
PT Discharge    Today's date: 2021  Patient name: Joseline Walsh  : 1945  MRN: [id number]  Referring provider: Gerardo Fairbanks MD  Dx:   Encounter Diagnosis     ICD-10-CM    1  DDD (degenerative disc disease), lumbar  M51 36                   Assessment  Assessment details: PT notes the patient with improved ROM t/o the lumbar spine but continuation of trunk/core weakness with increase symptoms and functional limitations but PT feels the patient has met MMI with current course of skilled therapy so PT with decision to DC with HEP  PT notes the patient was instructed in final HEP   Impairments: abnormal or restricted ROM, activity intolerance, impaired physical strength, lacks appropriate home exercise program, pain with function and poor posture   Understanding of Dx/Px/POC: good   Prognosis: good    Goals  ST  Initiate HEP-MET   2  Decrease pain levels by 25-50%-MET   3  Increase strength by 1-2 mm grades-MET  4  Increase ROM by 25-50%-Partial MET   LT  Improve spinal stabilization-Partial MET  2  Decrease limitations with standing, walking, and stairs-Partial MET  3  Decrease limitations with trunk movement, lifting, and carrying-Partial MET  4  Decrease limitations with ADL-Partial MET  5   DC with HEP-MET     Plan  Plan details: DC with HEP     Patient would benefit from: PT eval and skilled physical therapy  Planned modality interventions: thermotherapy: hydrocollator packs  Planned therapy interventions: manual therapy, neuromuscular re-education, patient education, self care, strengthening, stretching, therapeutic exercise, home exercise program, graded exercise, flexibility and gait training  Frequency: 2x week  Duration in weeks: 1  Treatment plan discussed with: patient        Subjective Evaluation    History of Present Illness  Mechanism of injury: Patient reports bilateral hip and LB pain for several months with insidious onset with progressive worsening over time leading to limitations with walking, standing, stairs, transfers, lifting, carrying, and ADL   Patient reports he went to ortho MD for evaluation and received MRI and x-ray of the lumbar spine with findings of DDD t/o lumbar spine  Patient reports he was referred to OPPT for evaluation and treatment of LBP  Patient reports he is retired with significant PMH of GERD, bilateral TKR, DM, diabetic neuropathy, HTN, and rectus abdominus  Presently the patient has attended 22 sessions of skilled therapy and feels 80-90% improvement since the start of therapy  Patient feels his back is moving better with decrease pain levels but continuation of weakness in the left LE with limitations with walking with need for continuation of skilled therapy  Patient reports limitations with FB of the trunk and lifting of household objects as well as difficulty with assisting/helping his spouse with transfers and ADL  Patient reports he is satisfied with current status and feels he is ready for a DC with HEP  Pain  Current pain ratin  At best pain ratin  At worst pain ratin  Location: Lumbar spine and bilateral hips   Quality: radiating, sharp and pulling  Relieving factors: rest  Aggravating factors: stair climbing, walking, standing and lifting  Progression: improved      Diagnostic Tests  X-ray: abnormal  MRI studies: abnormal  Treatments  Current treatment: physical therapy  Patient Goals  Patient goals for therapy: decreased pain, increased motion, increased strength, independence with ADLs/IADLs and return to sport/leisure activities          Objective     Postural Observations  Seated posture: fair  Standing posture: fair    Additional Postural Observation Details  PT notes flattening of the lumbar spine     Palpation   Left   Muscle spasm in the erector spinae and lumbar paraspinals  Tenderness of the erector spinae and lumbar paraspinals  Right   Muscle spasm in the iliacus and lumbar paraspinals  Tenderness of the iliacus and lumbar paraspinals  Tenderness     Left Hip   Tenderness in the greater trochanter  Right Hip   Tenderness in the greater trochanter  Neurological Testing     Reflexes   Left   Patellar (L4): normal (2+)  Achilles (S1): normal (2+)    Right   Patellar (L4): normal (2+)  Achilles (S1): normal (2+)    Active Range of Motion     Lumbar   Flexion:  WFL  Extension: 25 degrees   Left lateral flexion:  WFL and with pain  Right lateral flexion:  WFL and with pain  Left rotation: 19 degrees  with pain  Right rotation: 17 degrees  with pain  Left Hip   Flexion: 51 degrees   Abduction: WFL  External rotation (90/90): 25 degrees   Internal rotation (90/90): 12 degrees     Right Hip   Flexion: 59 degrees   Abduction: WFL  External rotation (90/90): 24 degrees   Internal rotation (90/90): 13 degrees   Left Knee   Normal active range of motion    Right Knee   Normal active range of motion    Additional Active Range of Motion Details  PT notes decrease ROM and strength t/o the lumbar spine  PT notes trunk/core weakness with abdominals of 3/5 and lumbar paraspinals of 3+/5  PT notes bulging abdomin secondary to rectus abdominus       Passive Range of Motion   Left Hip   Flexion: 76 degrees   External rotation (90/90): 30 degrees   Internal rotation (90/90): 15 degrees     Right Hip   Flexion: 73 degrees   External rotation (90/90): 28 degrees   Internal rotation (90/90): 15 degrees   Mechanical Assessment    Cervical      Thoracic      Lumbar    Standing flexion: repeated movements   Pain location:peripheralized  Pain intensity: worse  Pain level: increased  Standing extension: repeated movements  Pain location: centralized  Pain intensity: worse  Pain level: increased  Left Sidegliding: repeated movements  Pain location: no change  Right sidegliding: repeated movements  Pain location: no change    Strength/Myotome Testing     Left Hip   Planes of Motion   Flexion: 4  Extension: 5  Abduction: 4+  Adduction: 5  External rotation: 4+  Internal rotation: 4+    Right Hip   Planes of Motion   Flexion: 4+  Extension: 5  Abduction: 5  Adduction: 5  External rotation: 4+  Internal rotation: 4+    Left Knee   Flexion: 5  Extension: 4  Quadriceps contraction: good    Right Knee   Flexion: 5  Extension: 5  Quadriceps contraction: good    Ambulation     Observational Gait   Stride length within functional limits  Decreased walking speed                Precautions:  Bilateral TKR, DM, Diabetic Neuropathy, GERD, Rectus Abdominus

## 2021-01-21 NOTE — LETTER
2021    MD Mat Orantes    Patient: Jorge Andrew  YOB: 1945   Date of Visit: 2021     Encounter Diagnosis     ICD-10-CM    1  DDD (degenerative disc disease), lumbar  M51 36        Dear Dr Lara Six: Thank you for your recent referral of Jorge Andrew    Please review the attached discharge summary from Sander's recent visit  Please verify that you agree with the plan of care by signing the attached order  If you have any questions or concerns, please do not hesitate to call  I sincerely appreciate the opportunity to share in the care of one of your patients and hope to have another opportunity to work with you in the near future  Sincerely,    Aimee Nieto, PT      Referring Provider:      I certify that I have read the below Plan of Care and certify the need for these services furnished under this plan of treatment while under my care  MD Mat Orantes  Via Fax: [phone]          Daily Note     Today's date: 2021  Patient name: Jorge Andrew  : 1945  MRN: [id number]  Referring provider: Allegra Koch MD  Dx:   Encounter Diagnosis     ICD-10-CM    1  DDD (degenerative disc disease), lumbar  M51 36                   Subjective: patient acknowledged understanding HEP      Objective: See treatment diary below      Assessment: Patient has progressed well towards goals  Issued and educated patient with HEP with good understanding  Patient would benefit from transition to wellness program  Patient to be discharged from therapy services today         Plan: patient to be D/C from therapy services today     Precautions:  Bilateral TKR, DM, Diabetic Neuropathy, GERD, Rectus Abdominus     Manuals      Bilateral HS, quad, gastroc, piriformis, hip ABD, and manual hip traction  15 min with manual lumbar traction  15 min   Manual performed by PT 15 min with manual lumbar traction  15 min with manual lumbar traction                                Neuro Re-Ed         Stand OAL  15x3" Hold   Bilat  15x 3" hold  bilat 15x3" Hold   Bilat  15x 3" hold  bilat      Stand Lumbar extension against bar  15x5" Hold  15x 5" hold  15x5" Hold  15x 5" hold      Monster walk  6x10 Feet   Blue  6x 10 feet  Blue  NT      Side step and squat          Seated DD Trunk rotation and PNF  2x10 Each   Red PG Ball  2x10  Each  Red MB   10x Each  Yellow MB  10x each  Yellow MB     Seated DD LAQ and hip march  2x15 Each   1 5#  2x15  Each  1 5# 2x15 Bilat  1 5#  2x15 bilat  1 5#     Bridge with Tball                                              Ther Ex         SRC  10 min L6  10 min L6 10 min L6  10 min L6      Hip AB/AD machine  2x10   Each   60+1#/AB  45+1#/AD 2x10  Each  60+1# AB  45+1# AD 2x10   Each   60+1#/AB  45+1#/AD 2x10  Each  60+1# AB  45+1# AD     Leg and calf press  2x10 Each   60#  2x10  Each  60# 2x10 Each   65#  2x10 each  65#     Supine Tball ABD crunch  10x5" 10x 5" hold  15x5" Hold       TBall LTR 5" hd 10x5" Hold  10x 5" hold  10x5" Hold   Bilat       TBall DKTC 5" hd 10x5" Hold  10x 5" hold  10x5" Hold       Open door trunk rotation  10x5" Hold Bilat  10x 5" hold  5x10" Hold   Bilat                                           HEP update/review     15 min     Ther Activity                           Gait Training                           Modalities         MHP to the LB in seated  15 min  15 min  15 min with IFC  15 min with IFC                                  Attestation signed by Adriana Robertson PT at 2021  5:27 PM:  I supervised the visit  We discussed the case to ensure appropriate continuation and progression of care and I reviewed the documentation  PT Discharge    Today's date: 2021  Patient name: Woodford Snellen    : 1945  MRN: [id number]  Referring provider: Rita Ulrich MD  Dx:   Encounter Diagnosis ICD-10-CM    1  DDD (degenerative disc disease), lumbar  M51 36                   Assessment  Assessment details: PT notes the patient with improved ROM t/o the lumbar spine but continuation of trunk/core weakness with increase symptoms and functional limitations but PT feels the patient has met MMI with current course of skilled therapy so PT with decision to DC with HEP  PT notes the patient was instructed in final HEP   Impairments: abnormal or restricted ROM, activity intolerance, impaired physical strength, lacks appropriate home exercise program, pain with function and poor posture   Understanding of Dx/Px/POC: good   Prognosis: good    Goals  ST  Initiate HEP-MET   2  Decrease pain levels by 25-50%-MET   3  Increase strength by 1-2 mm grades-MET  4  Increase ROM by 25-50%-Partial MET   LT  Improve spinal stabilization-Partial MET  2  Decrease limitations with standing, walking, and stairs-Partial MET  3  Decrease limitations with trunk movement, lifting, and carrying-Partial MET  4  Decrease limitations with ADL-Partial MET  5   DC with HEP-MET     Plan  Plan details: DC with HEP  Patient would benefit from: PT eval and skilled physical therapy  Planned modality interventions: thermotherapy: hydrocollator packs  Planned therapy interventions: manual therapy, neuromuscular re-education, patient education, self care, strengthening, stretching, therapeutic exercise, home exercise program, graded exercise, flexibility and gait training  Frequency: 2x week  Duration in weeks: 1  Treatment plan discussed with: patient        Subjective Evaluation    History of Present Illness  Mechanism of injury: Patient reports bilateral hip and LB pain for several months with insidious onset with progressive worsening over time leading to limitations with walking, standing, stairs, transfers, lifting, carrying, and ADL     Patient reports he went to ortho MD for evaluation and received MRI and x-ray of the lumbar spine with findings of DDD t/o lumbar spine  Patient reports he was referred to [facility]PP[facility] for evaluation and treatment of LBP  Patient reports he is retired with significant PMH of GERD, bilateral TKR, DM, diabetic neuropathy, HTN, and rectus abdominus  Presently the patient has attended 22 sessions of skilled therapy and feels 80-90% improvement since the start of therapy  Patient feels his back is moving better with decrease pain levels but continuation of weakness in the left LE with limitations with walking with need for continuation of skilled therapy  Patient reports limitations with FB of the trunk and lifting of household objects as well as difficulty with assisting/helping his spouse with transfers and ADL  Patient reports he is satisfied with current status and feels he is ready for a DC with HEP  Pain  Current pain ratin  At best pain ratin  At worst pain ratin  Location: Lumbar spine and bilateral hips   Quality: radiating, sharp and pulling  Relieving factors: rest  Aggravating factors: stair climbing, walking, standing and lifting  Progression: improved      Diagnostic Tests  X-ray: abnormal  MRI studies: abnormal  Treatments  Current treatment: physical therapy  Patient Goals  Patient goals for therapy: decreased pain, increased motion, increased strength, independence with ADLs/IADLs and return to sport/leisure activities          Objective     Postural Observations  Seated posture: fair  Standing posture: fair    Additional Postural Observation Details  PT notes flattening of the lumbar spine     Palpation   Left   Muscle spasm in the erector spinae and lumbar paraspinals  Tenderness of the erector spinae and lumbar paraspinals  Right   Muscle spasm in the iliacus and lumbar paraspinals  Tenderness of the iliacus and lumbar paraspinals  Tenderness     Left Hip   Tenderness in the greater trochanter  Right Hip   Tenderness in the greater trochanter  Neurological Testing     Reflexes   Left   Patellar (L4): normal (2+)  Achilles (S1): normal (2+)    Right   Patellar (L4): normal (2+)  Achilles (S1): normal (2+)    Active Range of Motion     Lumbar   Flexion:  WFL  Extension: 25 degrees   Left lateral flexion:  WFL and with pain  Right lateral flexion:  WFL and with pain  Left rotation: 19 degrees  with pain  Right rotation: 17 degrees  with pain  Left Hip   Flexion: 51 degrees   Abduction: WFL  External rotation (90/90): 25 degrees   Internal rotation (90/90): 12 degrees     Right Hip   Flexion: 59 degrees   Abduction: WFL  External rotation (90/90): 24 degrees   Internal rotation (90/90): 13 degrees   Left Knee   Normal active range of motion    Right Knee   Normal active range of motion    Additional Active Range of Motion Details  PT notes decrease ROM and strength t/o the lumbar spine  PT notes trunk/core weakness with abdominals of 3/5 and lumbar paraspinals of 3+/5  PT notes bulging abdomin secondary to rectus abdominus       Passive Range of Motion   Left Hip   Flexion: 76 degrees   External rotation (90/90): 30 degrees   Internal rotation (90/90): 15 degrees     Right Hip   Flexion: 73 degrees   External rotation (90/90): 28 degrees   Internal rotation (90/90): 15 degrees   Mechanical Assessment    Cervical      Thoracic      Lumbar    Standing flexion: repeated movements   Pain location:peripheralized  Pain intensity: worse  Pain level: increased  Standing extension: repeated movements  Pain location: centralized  Pain intensity: worse  Pain level: increased  Left Sidegliding: repeated movements  Pain location: no change  Right sidegliding: repeated movements  Pain location: no change    Strength/Myotome Testing     Left Hip   Planes of Motion   Flexion: 4  Extension: 5  Abduction: 4+  Adduction: 5  External rotation: 4+  Internal rotation: 4+    Right Hip   Planes of Motion   Flexion: 4+  Extension: 5  Abduction: 5  Adduction: 5  External rotation: 4+  Internal rotation: 4+    Left Knee   Flexion: 5  Extension: 4  Quadriceps contraction: good    Right Knee   Flexion: 5  Extension: 5  Quadriceps contraction: good    Ambulation     Observational Gait   Stride length within functional limits  Decreased walking speed                Precautions:  Bilateral TKR, DM, Diabetic Neuropathy, GERD, Rectus Abdominus

## 2021-01-22 ENCOUNTER — TRANSCRIBE ORDERS (OUTPATIENT)
Dept: PHYSICAL THERAPY | Facility: CLINIC | Age: 76
End: 2021-01-22

## 2021-01-22 DIAGNOSIS — M51.36 DDD (DEGENERATIVE DISC DISEASE), LUMBAR: Primary | ICD-10-CM

## 2021-01-25 ENCOUNTER — APPOINTMENT (OUTPATIENT)
Dept: PHYSICAL THERAPY | Facility: CLINIC | Age: 76
End: 2021-01-25
Payer: COMMERCIAL

## 2021-01-28 ENCOUNTER — APPOINTMENT (OUTPATIENT)
Dept: PHYSICAL THERAPY | Facility: CLINIC | Age: 76
End: 2021-01-28
Payer: COMMERCIAL

## 2021-02-17 ENCOUNTER — TELEPHONE (OUTPATIENT)
Dept: INTERNAL MEDICINE CLINIC | Facility: CLINIC | Age: 76
End: 2021-02-17

## 2021-02-17 NOTE — TELEPHONE ENCOUNTER
----- Message from Sinan Sharma DO sent at 1/20/2021  4:03 PM EST -----  Call pt in four weeks and get update on his bp and urination

## 2021-02-17 NOTE — TELEPHONE ENCOUNTER
Spoke with patient  Patient states bp has improved and doing well-did not tell me any number readings  Also with urination he states he has started taking the pill in the AM instead of HS and has noticed a big difference  Patient also wanted to let you know tomorrow he is getting the injection in his back done at [facility]

## 2021-02-27 DIAGNOSIS — E11.41 TYPE 2 DIABETES MELLITUS WITH DIABETIC MONONEUROPATHY, WITHOUT LONG-TERM CURRENT USE OF INSULIN (HCC): ICD-10-CM

## 2021-03-01 RX ORDER — GLIMEPIRIDE 4 MG/1
TABLET ORAL
Qty: 180 TABLET | Refills: 1 | Status: SHIPPED | OUTPATIENT
Start: 2021-03-01 | End: 2021-03-26 | Stop reason: SDUPTHER

## 2021-03-10 DIAGNOSIS — Z23 ENCOUNTER FOR IMMUNIZATION: ICD-10-CM

## 2021-03-26 ENCOUNTER — OFFICE VISIT (OUTPATIENT)
Dept: INTERNAL MEDICINE CLINIC | Facility: CLINIC | Age: 76
End: 2021-03-26
Payer: COMMERCIAL

## 2021-03-26 VITALS
WEIGHT: 255.5 LBS | TEMPERATURE: 98 F | DIASTOLIC BLOOD PRESSURE: 70 MMHG | SYSTOLIC BLOOD PRESSURE: 130 MMHG | BODY MASS INDEX: 31.11 KG/M2 | OXYGEN SATURATION: 95 % | HEIGHT: 76 IN | HEART RATE: 86 BPM

## 2021-03-26 DIAGNOSIS — E78.2 MIXED HYPERLIPIDEMIA: ICD-10-CM

## 2021-03-26 DIAGNOSIS — N40.0 BENIGN PROSTATIC HYPERPLASIA WITHOUT LOWER URINARY TRACT SYMPTOMS: ICD-10-CM

## 2021-03-26 DIAGNOSIS — M15.9 PRIMARY OSTEOARTHRITIS INVOLVING MULTIPLE JOINTS: ICD-10-CM

## 2021-03-26 DIAGNOSIS — I10 ESSENTIAL HYPERTENSION: ICD-10-CM

## 2021-03-26 DIAGNOSIS — E11.21 DIABETIC NEPHROPATHY ASSOCIATED WITH TYPE 2 DIABETES MELLITUS (HCC): Primary | ICD-10-CM

## 2021-03-26 DIAGNOSIS — E11.41 TYPE 2 DIABETES MELLITUS WITH DIABETIC MONONEUROPATHY, WITHOUT LONG-TERM CURRENT USE OF INSULIN (HCC): ICD-10-CM

## 2021-03-26 DIAGNOSIS — K22.70 BARRETT'S ESOPHAGUS WITHOUT DYSPLASIA: ICD-10-CM

## 2021-03-26 DIAGNOSIS — I89.0 LYMPHEDEMA: ICD-10-CM

## 2021-03-26 LAB — SL AMB POCT HEMOGLOBIN AIC: 7.7 (ref ?–6.5)

## 2021-03-26 PROCEDURE — 3066F NEPHROPATHY DOC TX: CPT | Performed by: INTERNAL MEDICINE

## 2021-03-26 PROCEDURE — 83036 HEMOGLOBIN GLYCOSYLATED A1C: CPT | Performed by: INTERNAL MEDICINE

## 2021-03-26 PROCEDURE — 3051F HG A1C>EQUAL 7.0%<8.0%: CPT | Performed by: INTERNAL MEDICINE

## 2021-03-26 PROCEDURE — 3008F BODY MASS INDEX DOCD: CPT | Performed by: INTERNAL MEDICINE

## 2021-03-26 PROCEDURE — 99214 OFFICE O/P EST MOD 30 MIN: CPT | Performed by: INTERNAL MEDICINE

## 2021-03-26 RX ORDER — GLIMEPIRIDE 4 MG/1
TABLET ORAL
Qty: 180 TABLET | Refills: 1
Start: 2021-03-26

## 2021-03-26 NOTE — PATIENT INSTRUCTIONS
Type 2 Diabetes in the Older Adult   WHAT YOU NEED TO KNOW:   The risk for type 2 diabetes increases as a person gets older  Type 2 diabetes means your pancreas does not make enough insulin, or your body does not use insulin well  Insulin helps move sugar out of the blood so it can be used for energy  Diabetes cannot be cured, but it can be managed  DISCHARGE INSTRUCTIONS:   Call or have someone close to you call your local emergency number (911 in the [address] [facility]) for any of the following:   · You have any of the following signs of a stroke:      ? Numbness or drooping on one side of your face     ? Weakness in an arm or leg    ? Confusion or difficulty speaking    ? Dizziness, a severe headache, or vision loss    · You have any of the following signs of a heart attack:      ? Squeezing, pressure, or pain in your chest    ? You may  also have any of the following:     § Discomfort or pain in your back, neck, jaw, stomach, or arm    § Shortness of breath    § Nausea or vomiting    § Lightheadedness or a sudden cold sweat    Return to the emergency department if:   · Your blood sugar level is higher than your goal and does not come down with treatment  · You have signs of a high blood sugar level, such as blurred or double vision  · You have signs of a high ketone level, such as fruity, sweet smelling breath, or shallow breathing  · You have symptoms of a low blood sugar level, such as trouble thinking, sweating, or a pounding heartbeat  · Your blood sugar level is lower than normal and does not improve with treatment  Call your doctor or diabetes care team if:   · You are vomiting or have diarrhea  · You have an upset stomach and cannot eat the foods on your meal plan  · You feel weak or more tired than usual     · You feel dizzy, have headaches, or are easily irritated  · Your skin is red, warm, dry, or swollen      · You have a wound that does not heal     · You have numbness in your arms or legs     · You have trouble coping with diabetes, or you feel anxious or depressed  · You have problems with your memory  · You have changes in your vision  · You have questions or concerns about your condition or care  Manage diabetes and prevent problems:  Sometimes type 2 diabetes can be managed with changes in nutrition and physical activity  · Work with your diabetes care team to create plans to meet your needs  Your diabetes care team may include a physician, nurse practitioner, and physician assistant  It may also include a diabetes nurse educator, dietitian, and an exercise specialist  Family members, or others who are close to you, may also be part of the team  You and your team will make goals and plans to manage diabetes and other health problems  For example, the plan will include how to manage medicines you may take for diabetes and for other health conditions  The plans and goals will be specific to your needs and abilities  Your plan will change as your needs and abilities change  · Manage other health issues as directed  Health issues may include high blood pressure, high cholesterol levels, and heart problems  Health issues may also include depression  Together you and your care team can create a plan to manage any other health issues  · Try to be physically active for 30 to 60 minutes most days of the week  Physical activity, such as exercise, helps keep your blood sugar level steady and lowers your risk for heart disease  Physical activity can help improve your balance and strength and lower your risk for falls  Start slowly  Activity can be done in 10-minute intervals  ? Set a goal for 30 minutes of aerobic activity at least 5 times a week  Aerobic activity helps your heart stay strong  Aerobic activity includes walking, bicycling, dancing, swimming, and raking leaves  ? Set a goal for strength training 2 times a week    Strength training helps you keep the muscles you have and build new muscles  Strength training includes lifting weights, climbing stairs, and doing yoga or karin chi     ? Stay steady on your on your feet with balancing activities  These include walking backwards, standing on one foot, and walking heel to toe in a straight line  · Maintain a healthy weight  Ask your provider what a healthy weight is for you  A healthy weight can help you control diabetes and prevent heart disease  Ask your provider to help you create a weight loss plan if you are overweight  Weight loss of 10 to 15 pounds can help make a difference in managing diabetes  Together you and your care team can set manageable weight loss goals  · Know the risks if you choose to drink alcohol  Alcohol can cause your blood sugar levels to be low if you use insulin  Alcohol can cause high blood sugar levels and weight gain if you drink too much  Women 21 years or older and men 72 years or older should limit alcohol to 1 drink a day  Men 21 to 64 years should limit alcohol to 2 drinks a day  A drink of alcohol is 12 ounces of beer, 5 ounces of wine, or 1½ ounces of liquor  · Do not smoke  Nicotine and other chemicals in cigarettes can cause lung disease and other health problems  It can also cause blood vessel damage that makes diabetes more difficult to manage  Ask your healthcare provider for information if you currently smoke and need help to quit  Do not use e-cigarettes or smokeless tobacco in place of cigarettes or to help you quit  They still contain nicotine  Diabetes education:  Diabetes education will start right away  Members of your care team teach you, your family, and caregivers the following:  · How to check your blood sugar level: You will learn when to check your blood sugar level and what the level should be  You will learn what to do if your level is too high or too low  Write down the times of your checks and your levels   Take them to all follow-up appointments  · About diabetes medicine: You and your family members will be taught how to draw up and give insulin, if needed  You will learn how much insulin you need and what time to inject insulin  You will be taught when not to give insulin  Your team will also teach you how to dispose of needles and syringes  If you need oral diabetes medicine, you will be taught about side effects  You will also be taught when to take or not take the medicine  · About nutrition:  A dietitian will help you make a meal plan to keep your blood sugar level steady  You will learn how food affects your blood sugar levels  You will also learn to keep track of sugar and starchy foods (carbohydrates)  Do not skip meals  Your blood sugar level may drop too low if you have taken insulin and do not eat  · How to prevent complications:  Diabetes that is not well controlled can lead to health problems  Examples include foot sores, retinopathy (vision loss), and peripheral neuropathy (loss of feeling in your hands and feet)  Your team will help you know when to get regular checkups, such as vision checks  They will teach you how to watch for problems and when to get a problem checked  Other ways to manage diabetes:   · Check your feet every day for sores  Look at your whole foot, including the bottom, and between and under your toes  Check for wounds, corns, and calluses  Use a mirror to see the bottom of your feet  The skin on your feet may be shiny, tight, dry, or darker than normal  Your feet may also be cold and pale  Feel your feet by running your hands along the tops, bottoms, sides, and between your toes  Redness, swelling, and warmth are signs of blood flow problems that can lead to a foot ulcer  Do not try to remove corns or calluses yourself  · Wear medical alert identification  Wear medical alert jewelry or carry a card that says you have type 2 diabetes   Ask your healthcare provider where to get these items          · Ask about vaccines  You have a higher risk for serious illness if you get the flu, pneumonia, or hepatitis  Ask your healthcare provider if you should get a flu, pneumonia, shingles, or hepatitis B vaccine, and when to get the vaccine  · Get help from family and friends  You may need help checking your blood sugar level, giving insulin injections, or preparing your meals  You may also need help to check your feet for sores  Ask your family and friends to help you with these tasks  Talk to your care team if you need someone at home to help you  Follow up with your doctor or diabetes care team as directed: You will need to return to meet with different care team members  You may need tests to monitor for problems  Write down your questions so you remember to ask them during your visits  © Copyright [address] Information is for End User's use only and may not be sold, redistributed or otherwise used for commercial purposes  All illustrations and images included in CareNotes® are the copyrighted property of [employer]  or [facility] [employer]  The above information is an  only  It is not intended as medical advice for individual conditions or treatments  Talk to your doctor, nurse or pharmacist before following any medical regimen to see if it is safe and effective for you

## 2021-03-26 NOTE — PROGRESS NOTES
Assessment/Plan:  Problem List Items Addressed This Visit        Digestive    Alcantar's esophagus without dysplasia       Endocrine    Diabetic nephropathy ([facility] ) - Primary    Diabetes with neurologic complications ([facility] )    Relevant Orders    Comprehensive metabolic panel    POCT hemoglobin A1c       Cardiovascular and Mediastinum    Hypertension       Musculoskeletal and Integument    Osteoarthritis       Genitourinary    Benign prostate hyperplasia       Other    Lymphedema    Hyperlipidemia           Diagnoses and all orders for this visit:    Diabetic nephropathy associated with type 2 diabetes mellitus ([facility] )    Type 2 diabetes mellitus with diabetic mononeuropathy, without long-term current use of insulin ([facility] )  -     Comprehensive metabolic panel; Future  -     POCT hemoglobin A1c    Alcantar's esophagus without dysplasia    Essential hypertension    Primary osteoarthritis involving multiple joints    Benign prostatic hyperplasia without lower urinary tract symptoms    Mixed hyperlipidemia    Lymphedema        No problem-specific Assessment & Plan notes found for this encounter  A/P; Lalit Dacosta well and in office HgA1c was 7 7  Recommend adding a med, but pt wants to get back on his diet and exercises and let the epidural affect wear off rather than adding meds    Will order labs  Continue current treatment and RTC three months for routine  Subjective:      Patient ID: Guerrero Lazo  is a 78 y o  male  WM RTC for f/u dm, htn, etc  Doing well and no new issues  Remains active w/o difficulty and no falls  Sugars less than 140 and no low sugar events  No reflux  Chronic pain and lymphedema are manageable  Due for labs         The following portions of the patient's history were reviewed and updated as appropriate:   He has a past medical history of Arthritis, Chronic kidney disease, Diabetes mellitus ([facility] [address] ), Eustachian tube dysfunction, Gastroesophageal reflux disease without esophagitis (4/11/2018), Hypertension, Obesity, and Scrotal bleeding  ,  does not have any pertinent problems on file  ,   has a past surgical history that includes Cholecystectomy; Tumor removal; Total knee arthroplasty (Left); Total knee arthroplasty (Right); and Umbilical hernia repair  ,  family history includes No Known Problems in his mother  ,   reports that he has quit smoking  He has never used smokeless tobacco  He reports current alcohol use  He reports that he does not use drugs  ,  is allergic to sulfa antibiotics     Current Outpatient Medications   Medication Sig Dispense Refill    finasteride (PROSCAR) 5 mg tablet take 1 tablet by mouth once daily 90 tablet 1    glimepiride (AMARYL) 4 mg tablet take 1/2 tablet by mouth every morning and 1 tablet every evening 180 tablet 1    lisinopril (ZESTRIL) 20 mg tablet Take 1 tablet (20 mg total) by mouth daily      metFORMIN (GLUCOPHAGE) 1000 MG tablet TAKE 1 TABLET IN THE       MORNING, 1/2 TABLET AT     NOON, AND 1 TABLET IN THE  EVENING 225 tablet 3    olopatadine (PATANOL) 0 1 % ophthalmic solution Apply 1 drop to eye 2 (two) times a day 5 mL 5    omeprazole (PriLOSEC) 20 mg delayed release capsule Take 20 mg by mouth 2 (two) times a day  0    solifenacin (VESICARE) 10 MG tablet Take 1 tablet (10 mg total) by mouth daily 90 tablet 1    glucose blood (FREESTYLE TEST STRIPS) test strip 1 each by Other route 4 (four) times a day 400 each 1     No current facility-administered medications for this visit  Review of Systems   Constitutional: Negative for activity change, chills, diaphoresis, fatigue and fever  HENT: Negative  Eyes: Negative for visual disturbance  Respiratory: Negative for cough, chest tightness, shortness of breath and wheezing  Cardiovascular: Positive for leg swelling  Negative for chest pain and palpitations  Gastrointestinal: Negative for abdominal pain, constipation, diarrhea, nausea and vomiting     Endocrine: Negative for cold intolerance and heat intolerance  Genitourinary: Negative for difficulty urinating, dysuria and frequency  Musculoskeletal: Negative for arthralgias, gait problem and myalgias  Neurological: Negative for dizziness, syncope, speech difficulty, weakness, light-headedness and headaches  Psychiatric/Behavioral: Negative for confusion, dysphoric mood and sleep disturbance  The patient is not nervous/anxious  PHQ-9 Depression Screening    PHQ-9:   Frequency of the following problems over the past two weeks:            Objective:  Vitals:    03/26/21 1354   BP: 130/70   Pulse: 86   Temp: 98 °F (36 7 °C)   SpO2: 95%   Weight: 116 kg (255 lb 8 oz)   Height: 6' 4" (1 93 m)     Body mass index is 31 1 kg/m²  Physical Exam  Vitals signs and nursing note reviewed  Constitutional:       General: He is not in acute distress  Appearance: Normal appearance  He is not ill-appearing  HENT:      Head: Normocephalic and atraumatic  Mouth/Throat:      Mouth: Mucous membranes are moist    Eyes:      Extraocular Movements: Extraocular movements intact  Conjunctiva/sclera: Conjunctivae normal       Pupils: Pupils are equal, round, and reactive to light  Neck:      Musculoskeletal: Neck supple  Vascular: No carotid bruit  Cardiovascular:      Rate and Rhythm: Normal rate and regular rhythm  Heart sounds: Normal heart sounds  Pulmonary:      Effort: Pulmonary effort is normal  No respiratory distress  Breath sounds: Normal breath sounds  No wheezing or rales  Abdominal:      General: Bowel sounds are normal  There is no distension  Palpations: Abdomen is soft  Tenderness: There is no abdominal tenderness  Musculoskeletal:      Right lower leg: Edema present  Left lower leg: Edema present  Comments: Has bilat compression stockings on  Neurological:      General: No focal deficit present  Mental Status: He is alert and oriented to person, place, and time   Mental status is at baseline  Psychiatric:         Mood and Affect: Mood normal          Behavior: Behavior normal          Thought Content:  Thought content normal          Judgment: Judgment normal

## 2021-04-26 ENCOUNTER — DOCUMENTATION (OUTPATIENT)
Dept: INTERNAL MEDICINE CLINIC | Facility: CLINIC | Age: 76
End: 2021-04-26

## 2021-05-18 DIAGNOSIS — M33.00 JDMS (JUVENILE DERMATOMYOSITIS) (HCC): ICD-10-CM

## 2021-05-21 DIAGNOSIS — N40.0 BENIGN PROSTATIC HYPERPLASIA WITHOUT LOWER URINARY TRACT SYMPTOMS: ICD-10-CM

## 2021-05-21 RX ORDER — FINASTERIDE 5 MG/1
5 TABLET, FILM COATED ORAL DAILY
Qty: 90 TABLET | Refills: 1 | Status: SHIPPED | OUTPATIENT
Start: 2021-05-21 | End: 2021-06-25 | Stop reason: SDUPTHER

## 2021-05-26 DIAGNOSIS — H04.123 DRY EYE SYNDROME OF BOTH EYES: ICD-10-CM

## 2021-05-26 RX ORDER — OLOPATADINE HYDROCHLORIDE 1 MG/ML
1 SOLUTION/ DROPS OPHTHALMIC 2 TIMES DAILY
Qty: 5 ML | Refills: 5 | Status: SHIPPED | OUTPATIENT
Start: 2021-05-26

## 2021-06-25 ENCOUNTER — OFFICE VISIT (OUTPATIENT)
Dept: INTERNAL MEDICINE CLINIC | Facility: CLINIC | Age: 76
End: 2021-06-25
Payer: COMMERCIAL

## 2021-06-25 ENCOUNTER — VBI (OUTPATIENT)
Dept: ADMINISTRATIVE | Facility: OTHER | Age: 76
End: 2021-06-25

## 2021-06-25 VITALS
SYSTOLIC BLOOD PRESSURE: 126 MMHG | OXYGEN SATURATION: 95 % | DIASTOLIC BLOOD PRESSURE: 78 MMHG | HEART RATE: 85 BPM | HEIGHT: 76 IN | TEMPERATURE: 98.5 F | BODY MASS INDEX: 30.2 KG/M2 | WEIGHT: 248 LBS

## 2021-06-25 DIAGNOSIS — N40.0 BENIGN PROSTATIC HYPERPLASIA WITHOUT LOWER URINARY TRACT SYMPTOMS: ICD-10-CM

## 2021-06-25 DIAGNOSIS — Z13.31 NEGATIVE DEPRESSION SCREENING: ICD-10-CM

## 2021-06-25 DIAGNOSIS — K22.70 BARRETT'S ESOPHAGUS WITHOUT DYSPLASIA: ICD-10-CM

## 2021-06-25 DIAGNOSIS — I10 ESSENTIAL HYPERTENSION: ICD-10-CM

## 2021-06-25 DIAGNOSIS — I89.0 LYMPHEDEMA: ICD-10-CM

## 2021-06-25 DIAGNOSIS — E11.21 DIABETIC NEPHROPATHY ASSOCIATED WITH TYPE 2 DIABETES MELLITUS (HCC): ICD-10-CM

## 2021-06-25 DIAGNOSIS — E11.41 TYPE 2 DIABETES MELLITUS WITH DIABETIC MONONEUROPATHY, WITHOUT LONG-TERM CURRENT USE OF INSULIN (HCC): Primary | ICD-10-CM

## 2021-06-25 DIAGNOSIS — E53.8 VITAMIN B12 DEFICIENCY: ICD-10-CM

## 2021-06-25 DIAGNOSIS — Z12.5 SCREENING FOR PROSTATE CANCER: ICD-10-CM

## 2021-06-25 DIAGNOSIS — Z00.00 MEDICARE ANNUAL WELLNESS VISIT, SUBSEQUENT: ICD-10-CM

## 2021-06-25 DIAGNOSIS — E78.2 MIXED HYPERLIPIDEMIA: ICD-10-CM

## 2021-06-25 DIAGNOSIS — E11.9 ENCOUNTER FOR DIABETIC FOOT EXAM (HCC): ICD-10-CM

## 2021-06-25 DIAGNOSIS — M15.9 PRIMARY OSTEOARTHRITIS INVOLVING MULTIPLE JOINTS: ICD-10-CM

## 2021-06-25 LAB — SL AMB POCT HEMOGLOBIN AIC: 7.2 (ref ?–6.5)

## 2021-06-25 PROCEDURE — 1160F RVW MEDS BY RX/DR IN RCRD: CPT | Performed by: INTERNAL MEDICINE

## 2021-06-25 PROCEDURE — 1036F TOBACCO NON-USER: CPT | Performed by: INTERNAL MEDICINE

## 2021-06-25 PROCEDURE — 83036 HEMOGLOBIN GLYCOSYLATED A1C: CPT | Performed by: INTERNAL MEDICINE

## 2021-06-25 PROCEDURE — 1170F FXNL STATUS ASSESSED: CPT | Performed by: INTERNAL MEDICINE

## 2021-06-25 PROCEDURE — 3066F NEPHROPATHY DOC TX: CPT | Performed by: INTERNAL MEDICINE

## 2021-06-25 PROCEDURE — G0439 PPPS, SUBSEQ VISIT: HCPCS | Performed by: INTERNAL MEDICINE

## 2021-06-25 PROCEDURE — 3288F FALL RISK ASSESSMENT DOCD: CPT | Performed by: INTERNAL MEDICINE

## 2021-06-25 PROCEDURE — 99214 OFFICE O/P EST MOD 30 MIN: CPT | Performed by: INTERNAL MEDICINE

## 2021-06-25 PROCEDURE — 1125F AMNT PAIN NOTED PAIN PRSNT: CPT | Performed by: INTERNAL MEDICINE

## 2021-06-25 PROCEDURE — 3008F BODY MASS INDEX DOCD: CPT | Performed by: INTERNAL MEDICINE

## 2021-06-25 PROCEDURE — 1101F PT FALLS ASSESS-DOCD LE1/YR: CPT | Performed by: INTERNAL MEDICINE

## 2021-06-25 PROCEDURE — 3074F SYST BP LT 130 MM HG: CPT | Performed by: INTERNAL MEDICINE

## 2021-06-25 PROCEDURE — 3078F DIAST BP <80 MM HG: CPT | Performed by: INTERNAL MEDICINE

## 2021-06-25 PROCEDURE — 3051F HG A1C>EQUAL 7.0%<8.0%: CPT | Performed by: INTERNAL MEDICINE

## 2021-06-25 PROCEDURE — 3725F SCREEN DEPRESSION PERFORMED: CPT | Performed by: INTERNAL MEDICINE

## 2021-06-25 RX ORDER — FINASTERIDE 5 MG/1
5 TABLET, FILM COATED ORAL DAILY
Qty: 90 TABLET | Refills: 1 | Status: SHIPPED | OUTPATIENT
Start: 2021-06-25

## 2021-06-25 NOTE — PROGRESS NOTES
Assessment and Plan:     Problem List Items Addressed This Visit        Digestive    Alcantar's esophagus without dysplasia       Endocrine    Diabetic nephropathy ([facility] )    Diabetes with neurologic complications ([place] [address] ) - Primary    Relevant Orders    Comprehensive metabolic panel    CBC and differential    Lipid Panel with Direct LDL reflex    TSH, 3rd generation with Free T4 reflex    POCT hemoglobin A1c       Cardiovascular and Mediastinum    Hypertension       Musculoskeletal and Integument    Osteoarthritis       Genitourinary    Benign prostate hyperplasia    Relevant Medications    finasteride (PROSCAR) 5 mg tablet       Other    Vitamin B12 deficiency    Lymphedema    Hyperlipidemia    Relevant Orders    Lipid Panel with Direct LDL reflex      Other Visit Diagnoses     Encounter for diabetic foot exam ([place] [address] )        Medicare annual wellness visit, subsequent        Screening for prostate cancer        Relevant Orders    PSA, Total Screen    Negative depression screening               Preventive health issues were discussed with patient, and age appropriate screening tests were ordered as noted in patient's After Visit Summary  Personalized health advice and appropriate referrals for health education or preventive services given if needed, as noted in patient's After Visit Summary       History of Present Illness:     Patient presents for Medicare Annual Wellness visit    Patient Care Team:  Olivia Phelps DO as PCP - General (Internal Medicine)     Problem List:     Patient Active Problem List   Diagnosis    Benign prostate hyperplasia    Colon, diverticulosis    Diabetes with neurologic complications ([facility] )    Diabetic nephropathy ([place] [address] )    Erectile dysfunction of non-organic origin    Hyperlipidemia    Hypertension    Lymphedema    Obesity    Osteoarthritis    Polyp of sigmoid colon    Sleep disturbances    Vitamin B12 deficiency    Gastroesophageal reflux disease without esophagitis    Lyme disease    Alcantar's esophagus without dysplasia    Spinal stenosis of lumbosacral region      Past Medical and Surgical History:     Past Medical History:   Diagnosis Date    Arthritis     Chronic kidney disease     Diabetes mellitus ([address] )     Eustachian tube dysfunction     last assessed 12/12/2014    Gastroesophageal reflux disease without esophagitis 4/11/2018    Hypertension     Obesity     Scrotal bleeding     last assessed 11/10/2014     Past Surgical History:   Procedure Laterality Date    CHOLECYSTECTOMY      TOTAL KNEE ARTHROPLASTY Left     TOTAL KNEE ARTHROPLASTY Right     TUMOR REMOVAL      radical resection of soft tissue tumor of the shoulder    UMBILICAL HERNIA REPAIR        Family History:     Family History   Problem Relation Age of Onset    No Known Problems Mother       Social History:     Social History     Socioeconomic History    Marital status: /Civil Union     Spouse name: None    Number of children: None    Years of education: None    Highest education level: None   Occupational History    Occupation: retired   Tobacco Use    Smoking status: Former Smoker    Smokeless tobacco: Never Used   Substance and Sexual Activity    Alcohol use: Yes     Comment: socially    Drug use: No    Sexual activity: None   Other Topics Concern    None   Social History Narrative    Always uses seat belt     Social Determinants of Health     Financial Resource Strain:     Difficulty of Paying Living Expenses:    Food Insecurity:     Worried About Running Out of Food in the Last Year:     Ran Out of Food in the Last Year:    Transportation Needs:     Lack of Transportation (Medical):      Lack of Transportation (Non-Medical):    Physical Activity:     Days of Exercise per Week:     Minutes of Exercise per Session:    Stress:     Feeling of Stress :    Social Connections:     Frequency of Communication with Friends and Family:     Frequency of Social Gatherings with Friends and Family:     Attends Christian Services:     Active Member of Clubs or Organizations:     Attends Club or Organization Meetings:     Marital Status:    Intimate Partner Violence:     Fear of Current or Ex-Partner:     Emotionally Abused:     Physically Abused:     Sexually Abused:       Medications and Allergies:     Current Outpatient Medications   Medication Sig Dispense Refill    finasteride (PROSCAR) 5 mg tablet Take 1 tablet (5 mg total) by mouth daily 90 tablet 1    glimepiride (AMARYL) 4 mg tablet Two pills  q day  180 tablet 1    glucose blood (FREESTYLE TEST STRIPS) test strip 1 each by Other route 4 (four) times a day 400 each 1    lisinopril (ZESTRIL) 20 mg tablet Take 1 tablet (20 mg total) by mouth daily      metFORMIN (GLUCOPHAGE) 1000 MG tablet TAKE 1 TABLET IN THE       MORNING, 1/2 TABLET AT     NOON, AND 1 TABLET IN THE  EVENING 225 tablet 3    olopatadine (Patanol) 0 1 % ophthalmic solution Apply 1 drop to eye 2 (two) times a day 5 mL 5    omeprazole (PriLOSEC) 20 mg delayed release capsule Take 20 mg by mouth 2 (two) times a day  0    solifenacin (VESICARE) 10 MG tablet Take 1 tablet (10 mg total) by mouth daily 90 tablet 1     No current facility-administered medications for this visit       Allergies   Allergen Reactions    Sulfa Antibiotics       Immunizations:     Immunization History   Administered Date(s) Administered    INFLUENZA 09/08/2014, 10/01/2016, 09/01/2017, 09/18/2017, 08/23/2018, 09/10/2019, 08/31/2020    Influenza Split High Dose Preservative Free IM 09/30/2013    Influenza, seasonal, injectable 10/09/2012, 09/01/2017    Influenza, seasonal, injectable, preservative free 10/01/2016    Pneumococcal Conjugate 13-Valent 02/15/2017    Pneumococcal Polysaccharide PPV23 01/01/2009, 01/14/2019    SARS-CoV-2 / COVID-19 mRNA IM (Moderna) 04/23/2021    Td (adult), adsorbed 01/01/2009    Zoster 11/23/2020    Zoster Vaccine Recombinant 08/31/2020, 11/23/2020    influenza, trivalent, adjuvanted 09/10/2019      Health Maintenance:         Topic Date Due    Colorectal Cancer Screening  01/11/2024    Hepatitis C Screening  Completed         Topic Date Due    DTaP,Tdap,and Td Vaccines (1 - Tdap) 02/21/1966      Medicare Health Risk Assessment:     /78   Pulse 85   Temp 98 5 °F (36 9 °C)   Ht 6' 4" (1 93 m)   Wt 112 kg (248 lb)   SpO2 95%   BMI 30 19 kg/m²      Gurpreet George is here for his Subsequent Wellness visit  Last Medicare Wellness visit information reviewed, patient interviewed and updates made to the record today  Health Risk Assessment:   Patient rates overall health as good  Patient feels that their physical health rating is same  Patient is satisfied with their life  Eyesight was rated as same  Hearing was rated as same  Patient feels that their emotional and mental health rating is same  Patients states they are never, rarely angry  Patient states they are never, rarely unusually tired/fatigued  Pain experienced in the last 7 days has been none  Patient states that he has experienced no weight loss or gain in last 6 months  Depression Screening:   PHQ-2 Score: 0      Fall Risk Screening: In the past year, patient has experienced: no history of falling in past year      Home Safety:  Patient has working smoke alarms and has working carbon monoxide detector  Home safety hazards include: none  Nutrition:   Current diet is Regular  Medications:   Patient is not currently taking any over-the-counter supplements  Patient is able to manage medications  Activities of Daily Living (ADLs)/Instrumental Activities of Daily Living (IADLs):   Walk and transfer into and out of bed and chair?: Yes  Dress and groom yourself?: Yes    Bathe or shower yourself?: Yes    Feed yourself?  Yes  Do your laundry/housekeeping?: Yes  Manage your money, pay your bills and track your expenses?: Yes  Make your own meals?: Yes    Do your own shopping?: Yes    Previous Hospitalizations:   Any hospitalizations or ED visits within the last 12 months?: No      Advance Care Planning:   Living will: Yes    Durable POA for healthcare: Yes    Advanced directive: Yes    Advanced directive counseling given: Yes    Five wishes given: No    Patient declined ACP directive: No    End of Life Decisions reviewed with patient: Yes    Provider agrees with end of life decisions: Yes      Cognitive Screening:   Provider or family/friend/caregiver concerned regarding cognition?: No    PREVENTIVE SCREENINGS      Cardiovascular Screening:    General: Screening Not Indicated and History Lipid Disorder    Due for: Lipid Panel      Diabetes Screening:     General: Screening Not Indicated and History Diabetes    Due for: Blood Glucose      Colorectal Cancer Screening:     General: Screening Current      Prostate Cancer Screening:      Due for: PSA      Osteoporosis Screening:    General: Risks and Benefits Discussed and Patient Declines      Abdominal Aortic Aneurysm (AAA) Screening:    Risk factors include: age between 73-69 yo and tobacco use        General: Screening Not Indicated      Lung Cancer Screening:     General: Screening Not Indicated      Hepatitis C Screening:    General: Screening Current    Screening, Brief Intervention, and Referral to Treatment (SBIRT)    Screening  Typical number of drinks in a day: 0  Typical number of drinks in a week: 0  Interpretation: Low risk drinking behavior  Single Item Drug Screening:  How often have you used an illegal drug (including marijuana) or a prescription medication for non-medical reasons in the past year? never    Single Item Drug Screen Score: 0  Interpretation: Negative screen for possible drug use disorder    Other Counseling Topics:   Car/seat belt/driving safety, sunscreen and calcium and vitamin D intake and regular weightbearing exercise  A/P: Doing well and no falls reported  Denies depression and feels safe at home  Diverse diet   No problems operating a MV and uses seat belts  Has a living will and POA  No DME or referrals needed today  RTC one year for medicare wellness     Jg Argueta, DO

## 2021-06-25 NOTE — PROGRESS NOTES
Assessment/Plan:  Problem List Items Addressed This Visit        Digestive    Alcantar's esophagus without dysplasia       Endocrine    Diabetic nephropathy ([place] [address] )    Diabetes with neurologic complications ([place] [address] ) - Primary    Relevant Orders    Comprehensive metabolic panel    CBC and differential    Lipid Panel with Direct LDL reflex    TSH, 3rd generation with Free T4 reflex    POCT hemoglobin A1c (Completed)       Cardiovascular and Mediastinum    Hypertension       Musculoskeletal and Integument    Osteoarthritis       Genitourinary    Benign prostate hyperplasia    Relevant Medications    finasteride (PROSCAR) 5 mg tablet       Other    Vitamin B12 deficiency    Lymphedema    Hyperlipidemia    Relevant Orders    Lipid Panel with Direct LDL reflex      Other Visit Diagnoses     Encounter for diabetic foot exam ([place] [address] )        Medicare annual wellness visit, subsequent        Screening for prostate cancer        Relevant Orders    PSA, Total Screen    Negative depression screening               Diagnoses and all orders for this visit:    Type 2 diabetes mellitus with diabetic mononeuropathy, without long-term current use of insulin ([place] [address] )  -     Comprehensive metabolic panel; Future  -     CBC and differential; Future  -     Lipid Panel with Direct LDL reflex; Future  -     TSH, 3rd generation with Free T4 reflex; Future  -     POCT hemoglobin A1c    Essential hypertension    Alcantar's esophagus without dysplasia    Diabetic nephropathy associated with type 2 diabetes mellitus (HCC)    Primary osteoarthritis involving multiple joints    Benign prostatic hyperplasia without lower urinary tract symptoms  -     finasteride (PROSCAR) 5 mg tablet; Take 1 tablet (5 mg total) by mouth daily    Mixed hyperlipidemia  -     Lipid Panel with Direct LDL reflex;  Future    Lymphedema    Vitamin B12 deficiency    Encounter for diabetic foot exam ([place] [address] )    Medicare annual wellness visit, subsequent    Screening for prostate cancer  - PSA, Total Screen; Future    Negative depression screening        No problem-specific Assessment & Plan notes found for this encounter  A/P: Doing well and in office HgA1c was 7 2  Will check labs  DM foot exam deferred as below  Continue current treatment and RTC three months for routine  Subjective:      Patient ID: Stu Villanueva  is a 78 y o  male  WM RTC for f/u dm, htn, etc  Doing well and no new issues  Remains active w/o difficulty and no falls  Sugars less than 140 and no low sugar events  No reflux  Edema good as long as uses his stocking  Chronic pain is controlled  Due for labs  The following portions of the patient's history were reviewed and updated as appropriate:   He has a past medical history of Arthritis, Chronic kidney disease, Diabetes mellitus ([facility] ), Eustachian tube dysfunction, Gastroesophageal reflux disease without esophagitis (4/11/2018), Hypertension, Obesity, and Scrotal bleeding  ,  does not have any pertinent problems on file  ,   has a past surgical history that includes Cholecystectomy; Tumor removal; Total knee arthroplasty (Left); Total knee arthroplasty (Right); and Umbilical hernia repair  ,  family history includes No Known Problems in his mother  ,   reports that he has quit smoking  He has never used smokeless tobacco  He reports current alcohol use  He reports that he does not use drugs  ,  is allergic to sulfa antibiotics     Current Outpatient Medications   Medication Sig Dispense Refill    finasteride (PROSCAR) 5 mg tablet Take 1 tablet (5 mg total) by mouth daily 90 tablet 1    glimepiride (AMARYL) 4 mg tablet Two pills  q day   180 tablet 1    glucose blood (FREESTYLE TEST STRIPS) test strip 1 each by Other route 4 (four) times a day 400 each 1    lisinopril (ZESTRIL) 20 mg tablet Take 1 tablet (20 mg total) by mouth daily      metFORMIN (GLUCOPHAGE) 1000 MG tablet TAKE 1 TABLET IN THE       MORNING, 1/2 TABLET AT     NOON, AND 1 TABLET IN THE  EVENING 225 tablet 3    olopatadine (Patanol) 0 1 % ophthalmic solution Apply 1 drop to eye 2 (two) times a day 5 mL 5    omeprazole (PriLOSEC) 20 mg delayed release capsule Take 20 mg by mouth 2 (two) times a day  0    solifenacin (VESICARE) 10 MG tablet Take 1 tablet (10 mg total) by mouth daily 90 tablet 1     No current facility-administered medications for this visit  Review of Systems   Constitutional: Negative for activity change, chills, diaphoresis, fatigue and fever  HENT: Negative  Eyes: Negative for visual disturbance  Respiratory: Negative for cough, chest tightness, shortness of breath and wheezing  Cardiovascular: Positive for leg swelling  Negative for chest pain and palpitations  Gastrointestinal: Negative for abdominal pain, constipation, diarrhea, nausea and vomiting  Endocrine: Negative for cold intolerance and heat intolerance  Genitourinary: Negative for difficulty urinating, dysuria and frequency  Musculoskeletal: Negative for arthralgias, gait problem and myalgias  Neurological: Negative for dizziness, seizures, syncope, weakness, light-headedness and headaches  Psychiatric/Behavioral: Negative for confusion, dysphoric mood and sleep disturbance  The patient is not nervous/anxious  PHQ-9 Depression Screening    PHQ-9:   Frequency of the following problems over the past two weeks:      Little interest or pleasure in doing things: 0 - not at all  Feeling down, depressed, or hopeless: 0 - not at all  PHQ-2 Score: 0        Objective:  Vitals:    06/25/21 1435   BP: 126/78   Pulse: 85   Temp: 98 5 °F (36 9 °C)   SpO2: 95%   Weight: 112 kg (248 lb)   Height: 6' 4" (1 93 m)     Body mass index is 30 19 kg/m²  Physical Exam  Vitals and nursing note reviewed  Constitutional:       General: He is not in acute distress  Appearance: Normal appearance  He is not ill-appearing  HENT:      Head: Normocephalic and atraumatic        Mouth/Throat:      Mouth: Mucous membranes are moist    Eyes:      Extraocular Movements: Extraocular movements intact  Conjunctiva/sclera: Conjunctivae normal       Pupils: Pupils are equal, round, and reactive to light  Neck:      Vascular: No carotid bruit  Cardiovascular:      Rate and Rhythm: Normal rate and regular rhythm  Heart sounds: Normal heart sounds  Pulmonary:      Effort: Pulmonary effort is normal  No respiratory distress  Breath sounds: Normal breath sounds  No wheezing or rales  Abdominal:      General: Bowel sounds are normal  There is no distension  Palpations: Abdomen is soft  Tenderness: There is no abdominal tenderness  Musculoskeletal:      Cervical back: Neck supple  Right lower leg: Edema present  Left lower leg: Edema present  Comments: Trace with stockings  Neurological:      General: No focal deficit present  Mental Status: He is alert and oriented to person, place, and time  Mental status is at baseline  Psychiatric:         Mood and Affect: Mood normal          Behavior: Behavior normal          Thought Content: Thought content normal          Judgment: Judgment normal            DM FOOT: Pt declined due to wearing his compression stockings

## 2021-06-25 NOTE — PATIENT INSTRUCTIONS
Type 2 Diabetes in the Older Adult   WHAT YOU NEED TO KNOW:   The risk for type 2 diabetes increases as a person gets older  Type 2 diabetes means your pancreas does not make enough insulin, or your body does not use insulin well  Insulin helps move sugar out of the blood so it can be used for energy  Diabetes cannot be cured, but it can be managed  DISCHARGE INSTRUCTIONS:   Call or have someone close to you call your local emergency number (911 in the [address] [facility]) for any of the following:   · You have any of the following signs of a stroke:      ? Numbness or drooping on one side of your face     ? Weakness in an arm or leg    ? Confusion or difficulty speaking    ? Dizziness, a severe headache, or vision loss    · You have any of the following signs of a heart attack:      ? Squeezing, pressure, or pain in your chest    ? You may  also have any of the following:     § Discomfort or pain in your back, neck, jaw, stomach, or arm    § Shortness of breath    § Nausea or vomiting    § Lightheadedness or a sudden cold sweat    Return to the emergency department if:   · Your blood sugar level is higher than your goal and does not come down with treatment  · You have signs of a high blood sugar level, such as blurred or double vision  · You have signs of a high ketone level, such as fruity, sweet smelling breath, or shallow breathing  · You have symptoms of a low blood sugar level, such as trouble thinking, sweating, or a pounding heartbeat  · Your blood sugar level is lower than normal and does not improve with treatment  Call your doctor or diabetes care team if:   · You are vomiting or have diarrhea  · You have an upset stomach and cannot eat the foods on your meal plan  · You feel weak or more tired than usual     · You feel dizzy, have headaches, or are easily irritated  · Your skin is red, warm, dry, or swollen      · You have a wound that does not heal     · You have numbness in your arms or legs     · You have trouble coping with diabetes, or you feel anxious or depressed  · You have problems with your memory  · You have changes in your vision  · You have questions or concerns about your condition or care  Manage diabetes and prevent problems:  Sometimes type 2 diabetes can be managed with changes in nutrition and physical activity  · Work with your diabetes care team to create plans to meet your needs  Your diabetes care team may include a physician, nurse practitioner, and physician assistant  It may also include a diabetes nurse educator, dietitian, and an exercise specialist  Family members, or others who are close to you, may also be part of the team  You and your team will make goals and plans to manage diabetes and other health problems  For example, the plan will include how to manage medicines you may take for diabetes and for other health conditions  The plans and goals will be specific to your needs and abilities  Your plan will change as your needs and abilities change  · Manage other health issues as directed  Health issues may include high blood pressure, high cholesterol levels, and heart problems  Health issues may also include depression  Together you and your care team can create a plan to manage any other health issues  · Try to be physically active for 30 to 60 minutes most days of the week  Physical activity, such as exercise, helps keep your blood sugar level steady and lowers your risk for heart disease  Physical activity can help improve your balance and strength and lower your risk for falls  Start slowly  Activity can be done in 10-minute intervals  ? Set a goal for 30 minutes of aerobic activity at least 5 times a week  Aerobic activity helps your heart stay strong  Aerobic activity includes walking, bicycling, dancing, swimming, and raking leaves  ? Set a goal for strength training 2 times a week    Strength training helps you keep the muscles you have and build new muscles  Strength training includes lifting weights, climbing stairs, and doing yoga or karin chi     ? Stay steady on your on your feet with balancing activities  These include walking backwards, standing on one foot, and walking heel to toe in a straight line  · Maintain a healthy weight  Ask your provider what a healthy weight is for you  A healthy weight can help you control diabetes and prevent heart disease  Ask your provider to help you create a weight loss plan if you are overweight  Weight loss of 10 to 15 pounds can help make a difference in managing diabetes  Together you and your care team can set manageable weight loss goals  · Know the risks if you choose to drink alcohol  Alcohol can cause your blood sugar levels to be low if you use insulin  Alcohol can cause high blood sugar levels and weight gain if you drink too much  Women 21 years or older and men 72 years or older should limit alcohol to 1 drink a day  Men 21 to 64 years should limit alcohol to 2 drinks a day  A drink of alcohol is 12 ounces of beer, 5 ounces of wine, or 1½ ounces of liquor  · Do not smoke  Nicotine and other chemicals in cigarettes can cause lung disease and other health problems  It can also cause blood vessel damage that makes diabetes more difficult to manage  Ask your healthcare provider for information if you currently smoke and need help to quit  Do not use e-cigarettes or smokeless tobacco in place of cigarettes or to help you quit  They still contain nicotine  Diabetes education:  Diabetes education will start right away  Members of your care team teach you, your family, and caregivers the following:  · How to check your blood sugar level: You will learn when to check your blood sugar level and what the level should be  You will learn what to do if your level is too high or too low  Write down the times of your checks and your levels   Take them to all follow-up appointments  · About diabetes medicine: You and your family members will be taught how to draw up and give insulin, if needed  You will learn how much insulin you need and what time to inject insulin  You will be taught when not to give insulin  Your team will also teach you how to dispose of needles and syringes  If you need oral diabetes medicine, you will be taught about side effects  You will also be taught when to take or not take the medicine  · About nutrition:  A dietitian will help you make a meal plan to keep your blood sugar level steady  You will learn how food affects your blood sugar levels  You will also learn to keep track of sugar and starchy foods (carbohydrates)  Do not skip meals  Your blood sugar level may drop too low if you have taken insulin and do not eat  · How to prevent complications:  Diabetes that is not well controlled can lead to health problems  Examples include foot sores, retinopathy (vision loss), and peripheral neuropathy (loss of feeling in your hands and feet)  Your team will help you know when to get regular checkups, such as vision checks  They will teach you how to watch for problems and when to get a problem checked  Other ways to manage diabetes:   · Check your feet every day for sores  Look at your whole foot, including the bottom, and between and under your toes  Check for wounds, corns, and calluses  Use a mirror to see the bottom of your feet  The skin on your feet may be shiny, tight, dry, or darker than normal  Your feet may also be cold and pale  Feel your feet by running your hands along the tops, bottoms, sides, and between your toes  Redness, swelling, and warmth are signs of blood flow problems that can lead to a foot ulcer  Do not try to remove corns or calluses yourself  · Wear medical alert identification  Wear medical alert jewelry or carry a card that says you have type 2 diabetes   Ask your healthcare provider where to get these items          · Ask about vaccines  You have a higher risk for serious illness if you get the flu, pneumonia, or hepatitis  Ask your healthcare provider if you should get a flu, pneumonia, shingles, or hepatitis B vaccine, and when to get the vaccine  · Get help from family and friends  You may need help checking your blood sugar level, giving insulin injections, or preparing your meals  You may also need help to check your feet for sores  Ask your family and friends to help you with these tasks  Talk to your care team if you need someone at home to help you  Follow up with your doctor or diabetes care team as directed: You will need to return to meet with different care team members  You may need tests to monitor for problems  Write down your questions so you remember to ask them during your visits  © Copyright [address] Information is for End User's use only and may not be sold, redistributed or otherwise used for commercial purposes  All illustrations and images included in CareNotes® are the copyrighted property of A D A M , Inc  or [facility] Phyllis Yung   The above information is an  only  It is not intended as medical advice for individual conditions or treatments  Talk to your doctor, nurse or pharmacist before following any medical regimen to see if it is safe and effective for you  Medicare Preventive Visit Patient Instructions  Thank you for completing your Welcome to Medicare Visit or Medicare Annual Wellness Visit today  Your next wellness visit will be due in one year (6/26/2022)  The screening/preventive services that you may require over the next 5-10 years are detailed below  Some tests may not apply to you based off risk factors and/or age  Screening tests ordered at today's visit but not completed yet may show as past due  Also, please note that scanned in results may not display below    Preventive Screenings:  Service Recommendations Previous Testing/Comments Colorectal Cancer Screening  · Colonoscopy    · Fecal Occult Blood Test (FOBT)/Fecal Immunochemical Test (FIT)  · Fecal DNA/Cologuard Test  · Flexible Sigmoidoscopy Age: 54-65 years old   Colonoscopy: every 10 years (May be performed more frequently if at higher risk)  OR  FOBT/FIT: every 1 year  OR  Cologuard: every 3 years  OR  Sigmoidoscopy: every 5 years  Screening may be recommended earlier than age 48 if at higher risk for colorectal cancer  Also, an individualized decision between you and your healthcare provider will decide whether screening between the ages of 74-80 would be appropriate  Colonoscopy: 01/11/2021  FOBT/FIT: Not on file  Cologuard: Not on file  Sigmoidoscopy: Not on file    Screening Current     Prostate Cancer Screening Individualized decision between patient and health care provider in men between ages of 53-78   Medicare will cover every 12 months beginning on the day after your 50th birthday PSA: 0 4 ng/mL           Hepatitis C Screening Once for adults born between 1945 and 1965  More frequently in patients at high risk for Hepatitis C Hep C Antibody: 02/15/2017    Screening Current   Diabetes Screening 1-2 times per year if you're at risk for diabetes or have pre-diabetes Fasting glucose: 128 mg/dL   A1C: 7 7    Screening Not Indicated  History Diabetes   Cholesterol Screening Once every 5 years if you don't have a lipid disorder  May order more often based on risk factors  Lipid panel: 06/08/2020    Screening Not Indicated  History Lipid Disorder      Other Preventive Screenings Covered by Medicare:  1  Abdominal Aortic Aneurysm (AAA) Screening: covered once if your at risk  You're considered to be at risk if you have a family history of AAA or a male between the age of 73-68 who smoking at least 100 cigarettes in your lifetime    2  Lung Cancer Screening: covers low dose CT scan once per year if you meet all of the following conditions: (1) Age 50-69; (2) No signs or symptoms of lung cancer; (3) Current smoker or have quit smoking within the last 15 years; (4) You have a tobacco smoking history of at least 30 pack years (packs per day x number of years you smoked); (5) You get a written order from a healthcare provider  3  Glaucoma Screening: covered annually if you're considered high risk: (1) You have diabetes OR (2) Family history of glaucoma OR (3)  aged 48 and older OR (3)  American aged 72 and older  3  Osteoporosis Screening: covered every 2 years if you meet one of the following conditions: (1) Have a vertebral abnormality; (2) On glucocorticoid therapy for more than 3 months; (3) Have primary hyperparathyroidism; (4) On osteoporosis medications and need to assess response to drug therapy  5  HIV Screening: covered annually if you're between the age of 12-76  Also covered annually if you are younger than 13 and older than 72 with risk factors for HIV infection  For pregnant patients, it is covered up to 3 times per pregnancy  Immunizations:  Immunization Recommendations   Influenza Vaccine Annual influenza vaccination during flu season is recommended for all persons aged >= 6 months who do not have contraindications   Pneumococcal Vaccine (Prevnar and Pneumovax)  * Prevnar = PCV13  * Pneumovax = PPSV23 Adults 25-60 years old: 1-3 doses may be recommended based on certain risk factors  Adults 72 years old: Prevnar (PCV13) vaccine recommended followed by Pneumovax (PPSV23) vaccine  If already received PPSV23 since turning 65, then PCV13 recommended at least one year after PPSV23 dose  Hepatitis B Vaccine 3 dose series if at intermediate or high risk (ex: diabetes, end stage renal disease, liver disease)   Tetanus (Td) Vaccine - COST NOT COVERED BY MEDICARE PART B Following completion of primary series, a booster dose should be given every 10 years to maintain immunity against tetanus  Td may also be given as tetanus wound prophylaxis     Tdap Vaccine - COST NOT COVERED BY MEDICARE PART B Recommended at least once for all adults  For pregnant patients, recommended with each pregnancy  Shingles Vaccine (Shingrix) - COST NOT COVERED BY MEDICARE PART B  2 shot series recommended in those aged 48 and above     Health Maintenance Due:      Topic Date Due    Colorectal Cancer Screening  01/11/2024    Hepatitis C Screening  Completed     Immunizations Due:      Topic Date Due    DTaP,Tdap,and Td Vaccines (1 - Tdap) 02/21/1966     Advance Directives   What are advance directives? Advance directives are legal documents that state your wishes and plans for medical care  These plans are made ahead of time in case you lose your ability to make decisions for yourself  Advance directives can apply to any medical decision, such as the treatments you want, and if you want to donate organs  What are the types of advance directives? There are many types of advance directives, and each state has rules about how to use them  You may choose a combination of any of the following:  · Living will: This is a written record of the treatment you want  You can also choose which treatments you do not want, which to limit, and which to stop at a certain time  This includes surgery, medicine, IV fluid, and tube feedings  · Durable power of  for healthcare [facility]): This is a written record that states who you want to make healthcare choices for you when you are unable to make them for yourself  This person, called a proxy, is usually a family member or a friend  You may choose more than 1 proxy  · Do not resuscitate (DNR) order:  A DNR order is used in case your heart stops beating or you stop breathing  It is a request not to have certain forms of treatment, such as CPR  A DNR order may be included in other types of advance directives  · Medical directive: This covers the care that you want if you are in a coma, near death, or unable to make decisions for yourself   You can list the treatments you want for each condition  Treatment may include pain medicine, surgery, blood transfusions, dialysis, IV or tube feedings, and a ventilator (breathing machine)  · Values history: This document has questions about your views, beliefs, and how you feel and think about life  This information can help others choose the care that you would choose  Why are advance directives important? An advance directive helps you control your care  Although spoken wishes may be used, it is better to have your wishes written down  Spoken wishes can be misunderstood, or not followed  Treatments may be given even if you do not want them  An advance directive may make it easier for your family to make difficult choices about your care  Weight Management   Why it is important to manage your weight:  Being overweight increases your risk of health conditions such as heart disease, high blood pressure, type 2 diabetes, and certain types of cancer  It can also increase your risk for osteoarthritis, sleep apnea, and other respiratory problems  Aim for a slow, steady weight loss  Even a small amount of weight loss can lower your risk of health problems  How to lose weight safely:  A safe and healthy way to lose weight is to eat fewer calories and get regular exercise  You can lose up about 1 pound a week by decreasing the number of calories you eat by 500 calories each day  Healthy meal plan for weight management:  A healthy meal plan includes a variety of foods, contains fewer calories, and helps you stay healthy  A healthy meal plan includes the following:  · Eat whole-grain foods more often  A healthy meal plan should contain fiber  Fiber is the part of grains, fruits, and vegetables that is not broken down by your body  Whole-grain foods are healthy and provide extra fiber in your diet  Some examples of whole-grain foods are whole-wheat breads and pastas, oatmeal, brown rice, and bulgur    · Eat a variety of vegetables every day  Include dark, leafy greens such as spinach, kale, jeff greens, and mustard greens  Eat yellow and orange vegetables such as carrots, sweet potatoes, and winter squash  · Eat a variety of fruits every day  Choose fresh or canned fruit (canned in its own juice or light syrup) instead of juice  Fruit juice has very little or no fiber  · Eat low-fat dairy foods  Drink fat-free (skim) milk or 1% milk  Eat fat-free yogurt and low-fat cottage cheese  Try low-fat cheeses such as mozzarella and other reduced-fat cheeses  · Choose meat and other protein foods that are low in fat  Choose beans or other legumes such as split peas or lentils  Choose fish, skinless poultry (chicken or turkey), or lean cuts of red meat (beef or pork)  Before you cook meat or poultry, cut off any visible fat  · Use less fat and oil  Try baking foods instead of frying them  Add less fat, such as margarine, sour cream, regular salad dressing and mayonnaise to foods  Eat fewer high-fat foods  Some examples of high-fat foods include french fries, doughnuts, ice cream, and cakes  · Eat fewer sweets  Limit foods and drinks that are high in sugar  This includes candy, cookies, regular soda, and sweetened drinks  Exercise:  Exercise at least 30 minutes per day on most days of the week  Some examples of exercise include walking, biking, dancing, and swimming  You can also fit in more physical activity by taking the stairs instead of the elevator or parking farther away from stores  Ask your healthcare provider about the best exercise plan for you  © Copyright [employer] 2018 Information is for End User's use only and may not be sold, redistributed or otherwise used for commercial purposes   All illustrations and images included in CareNotes® are the copyrighted property of [employer]  or [facility] Preventive Visit Patient Instructions  Thank you for completing your Welcome to [facility] Visit or Medicare Annual Wellness Visit today  Your next wellness visit will be due in one year (6/26/2022)  The screening/preventive services that you may require over the next 5-10 years are detailed below  Some tests may not apply to you based off risk factors and/or age  Screening tests ordered at today's visit but not completed yet may show as past due  Also, please note that scanned in results may not display below  Preventive Screenings:  Service Recommendations Previous Testing/Comments   Colorectal Cancer Screening  · Colonoscopy    · Fecal Occult Blood Test (FOBT)/Fecal Immunochemical Test (FIT)  · Fecal DNA/Cologuard Test  · Flexible Sigmoidoscopy Age: 54-65 years old   Colonoscopy: every 10 years (May be performed more frequently if at higher risk)  OR  FOBT/FIT: every 1 year  OR  Cologuard: every 3 years  OR  Sigmoidoscopy: every 5 years  Screening may be recommended earlier than age 48 if at higher risk for colorectal cancer  Also, an individualized decision between you and your healthcare provider will decide whether screening between the ages of 74-80 would be appropriate  Colonoscopy: 01/11/2021  FOBT/FIT: Not on file  Cologuard: Not on file  Sigmoidoscopy: Not on file    Screening Current     Prostate Cancer Screening Individualized decision between patient and health care provider in men between ages of 53-78   Medicare will cover every 12 months beginning on the day after your 50th birthday PSA: 0 4 ng/mL           Hepatitis C Screening Once for adults born between 1945 and 1965  More frequently in patients at high risk for Hepatitis C Hep C Antibody: 02/15/2017    Screening Current   Diabetes Screening 1-2 times per year if you're at risk for diabetes or have pre-diabetes Fasting glucose: 128 mg/dL   A1C: 7 7    Screening Not Indicated  History Diabetes   Cholesterol Screening Once every 5 years if you don't have a lipid disorder  May order more often based on risk factors   Lipid panel: 06/08/2020    Screening Not Indicated  History Lipid Disorder      Other Preventive Screenings Covered by Medicare:  6  Abdominal Aortic Aneurysm (AAA) Screening: covered once if your at risk  You're considered to be at risk if you have a family history of AAA or a male between the age of 73-68 who smoking at least 100 cigarettes in your lifetime  7  Lung Cancer Screening: covers low dose CT scan once per year if you meet all of the following conditions: (1) Age 50-69; (2) No signs or symptoms of lung cancer; (3) Current smoker or have quit smoking within the last 15 years; (4) You have a tobacco smoking history of at least 30 pack years (packs per day x number of years you smoked); (5) You get a written order from a healthcare provider  8  Glaucoma Screening: covered annually if you're considered high risk: (1) You have diabetes OR (2) Family history of glaucoma OR (3)  aged 48 and older OR (3)  American aged 72 and older  5  Osteoporosis Screening: covered every 2 years if you meet one of the following conditions: (1) Have a vertebral abnormality; (2) On glucocorticoid therapy for more than 3 months; (3) Have primary hyperparathyroidism; (4) On osteoporosis medications and need to assess response to drug therapy  10  HIV Screening: covered annually if you're between the age of 12-76  Also covered annually if you are younger than 13 and older than 72 with risk factors for HIV infection  For pregnant patients, it is covered up to 3 times per pregnancy      Immunizations:  Immunization Recommendations   Influenza Vaccine Annual influenza vaccination during flu season is recommended for all persons aged >= 6 months who do not have contraindications   Pneumococcal Vaccine (Prevnar and Pneumovax)  * Prevnar = PCV13  * Pneumovax = PPSV23 Adults 25-60 years old: 1-3 doses may be recommended based on certain risk factors  Adults 72 years old: Prevnar (PCV13) vaccine recommended followed by Pneumovax (PPSV23) vaccine  If already received PPSV23 since turning 65, then PCV13 recommended at least one year after PPSV23 dose  Hepatitis B Vaccine 3 dose series if at intermediate or high risk (ex: diabetes, end stage renal disease, liver disease)   Tetanus (Td) Vaccine - COST NOT COVERED BY MEDICARE PART B Following completion of primary series, a booster dose should be given every 10 years to maintain immunity against tetanus  Td may also be given as tetanus wound prophylaxis  Tdap Vaccine - COST NOT COVERED BY MEDICARE PART B Recommended at least once for all adults  For pregnant patients, recommended with each pregnancy  Shingles Vaccine (Shingrix) - COST NOT COVERED BY MEDICARE PART B  2 shot series recommended in those aged 48 and above     Health Maintenance Due:      Topic Date Due    Colorectal Cancer Screening  01/11/2024    Hepatitis C Screening  Completed     Immunizations Due:      Topic Date Due    DTaP,Tdap,and Td Vaccines (1 - Tdap) 02/21/1966     Advance Directives   What are advance directives? Advance directives are legal documents that state your wishes and plans for medical care  These plans are made ahead of time in case you lose your ability to make decisions for yourself  Advance directives can apply to any medical decision, such as the treatments you want, and if you want to donate organs  What are the types of advance directives? There are many types of advance directives, and each state has rules about how to use them  You may choose a combination of any of the following:  · Living will: This is a written record of the treatment you want  You can also choose which treatments you do not want, which to limit, and which to stop at a certain time  This includes surgery, medicine, IV fluid, and tube feedings  · Durable power of  for healthcare [facility]):   This is a written record that states who you want to make healthcare choices for you when you are unable to make them for yourself  This person, called a proxy, is usually a family member or a friend  You may choose more than 1 proxy  · Do not resuscitate (DNR) order:  A DNR order is used in case your heart stops beating or you stop breathing  It is a request not to have certain forms of treatment, such as CPR  A DNR order may be included in other types of advance directives  · Medical directive: This covers the care that you want if you are in a coma, near death, or unable to make decisions for yourself  You can list the treatments you want for each condition  Treatment may include pain medicine, surgery, blood transfusions, dialysis, IV or tube feedings, and a ventilator (breathing machine)  · Values history: This document has questions about your views, beliefs, and how you feel and think about life  This information can help others choose the care that you would choose  Why are advance directives important? An advance directive helps you control your care  Although spoken wishes may be used, it is better to have your wishes written down  Spoken wishes can be misunderstood, or not followed  Treatments may be given even if you do not want them  An advance directive may make it easier for your family to make difficult choices about your care  Weight Management   Why it is important to manage your weight:  Being overweight increases your risk of health conditions such as heart disease, high blood pressure, type 2 diabetes, and certain types of cancer  It can also increase your risk for osteoarthritis, sleep apnea, and other respiratory problems  Aim for a slow, steady weight loss  Even a small amount of weight loss can lower your risk of health problems  How to lose weight safely:  A safe and healthy way to lose weight is to eat fewer calories and get regular exercise  You can lose up about 1 pound a week by decreasing the number of calories you eat by 500 calories each day     Healthy meal plan for weight management: A healthy meal plan includes a variety of foods, contains fewer calories, and helps you stay healthy  A healthy meal plan includes the following:  · Eat whole-grain foods more often  A healthy meal plan should contain fiber  Fiber is the part of grains, fruits, and vegetables that is not broken down by your body  Whole-grain foods are healthy and provide extra fiber in your diet  Some examples of whole-grain foods are whole-wheat breads and pastas, oatmeal, brown rice, and bulgur  · Eat a variety of vegetables every day  Include dark, leafy greens such as spinach, kale, jeff greens, and mustard greens  Eat yellow and orange vegetables such as carrots, sweet potatoes, and winter squash  · Eat a variety of fruits every day  Choose fresh or canned fruit (canned in its own juice or light syrup) instead of juice  Fruit juice has very little or no fiber  · Eat low-fat dairy foods  Drink fat-free (skim) milk or 1% milk  Eat fat-free yogurt and low-fat cottage cheese  Try low-fat cheeses such as mozzarella and other reduced-fat cheeses  · Choose meat and other protein foods that are low in fat  Choose beans or other legumes such as split peas or lentils  Choose fish, skinless poultry (chicken or turkey), or lean cuts of red meat (beef or pork)  Before you cook meat or poultry, cut off any visible fat  · Use less fat and oil  Try baking foods instead of frying them  Add less fat, such as margarine, sour cream, regular salad dressing and mayonnaise to foods  Eat fewer high-fat foods  Some examples of high-fat foods include french fries, doughnuts, ice cream, and cakes  · Eat fewer sweets  Limit foods and drinks that are high in sugar  This includes candy, cookies, regular soda, and sweetened drinks  Exercise:  Exercise at least 30 minutes per day on most days of the week  Some examples of exercise include walking, biking, dancing, and swimming   You can also fit in more physical activity by taking the stairs instead of the elevator or parking farther away from stores  Ask your healthcare provider about the best exercise plan for you  © Copyright [employer] 2018 Information is for End User's use only and may not be sold, redistributed or otherwise used for commercial purposes  All illustrations and images included in CareNotes® are the copyrighted property of [employer]  or [facility] Preventive Visit Patient Instructions  Thank you for completing your Welcome to Medicare Visit or Medicare Annual Wellness Visit today  Your next wellness visit will be due in one year (6/26/2022)  The screening/preventive services that you may require over the next 5-10 years are detailed below  Some tests may not apply to you based off risk factors and/or age  Screening tests ordered at today's visit but not completed yet may show as past due  Also, please note that scanned in results may not display below  Preventive Screenings:  Service Recommendations Previous Testing/Comments   Colorectal Cancer Screening  · Colonoscopy    · Fecal Occult Blood Test (FOBT)/Fecal Immunochemical Test (FIT)  · Fecal DNA/Cologuard Test  · Flexible Sigmoidoscopy Age: 54-65 years old   Colonoscopy: every 10 years (May be performed more frequently if at higher risk)  OR  FOBT/FIT: every 1 year  OR  Cologuard: every 3 years  OR  Sigmoidoscopy: every 5 years  Screening may be recommended earlier than age 48 if at higher risk for colorectal cancer  Also, an individualized decision between you and your healthcare provider will decide whether screening between the ages of 74-80 would be appropriate   Colonoscopy: 01/11/2021  FOBT/FIT: Not on file  Cologuard: Not on file  Sigmoidoscopy: Not on file    Screening Current     Prostate Cancer Screening Individualized decision between patient and health care provider in men between ages of 53-78   Medicare will cover every 12 months beginning on the day after your 50th birthday PSA: 0 4 ng/mL           Hepatitis C Screening Once for adults born between 1945 and 1965  More frequently in patients at high risk for Hepatitis C Hep C Antibody: 02/15/2017    Screening Current   Diabetes Screening 1-2 times per year if you're at risk for diabetes or have pre-diabetes Fasting glucose: 128 mg/dL   A1C: 7 7    Screening Not Indicated  History Diabetes   Cholesterol Screening Once every 5 years if you don't have a lipid disorder  May order more often based on risk factors  Lipid panel: 06/08/2020    Screening Not Indicated  History Lipid Disorder      Other Preventive Screenings Covered by Medicare:  11  Abdominal Aortic Aneurysm (AAA) Screening: covered once if your at risk  You're considered to be at risk if you have a family history of AAA or a male between the age of 73-68 who smoking at least 100 cigarettes in your lifetime  12  Lung Cancer Screening: covers low dose CT scan once per year if you meet all of the following conditions: (1) Age 50-69; (2) No signs or symptoms of lung cancer; (3) Current smoker or have quit smoking within the last 15 years; (4) You have a tobacco smoking history of at least 30 pack years (packs per day x number of years you smoked); (5) You get a written order from a healthcare provider  15  Glaucoma Screening: covered annually if you're considered high risk: (1) You have diabetes OR (2) Family history of glaucoma OR (3)  aged 48 and older OR (3)  American aged 72 and older  15  Osteoporosis Screening: covered every 2 years if you meet one of the following conditions: (1) Have a vertebral abnormality; (2) On glucocorticoid therapy for more than 3 months; (3) Have primary hyperparathyroidism; (4) On osteoporosis medications and need to assess response to drug therapy  15  HIV Screening: covered annually if you're between the age of 12-76  Also covered annually if you are younger than 13 and older than 72 with risk factors for HIV infection   For pregnant patients, it is covered up to 3 times per pregnancy  Immunizations:  Immunization Recommendations   Influenza Vaccine Annual influenza vaccination during flu season is recommended for all persons aged >= 6 months who do not have contraindications   Pneumococcal Vaccine (Prevnar and Pneumovax)  * Prevnar = PCV13  * Pneumovax = PPSV23 Adults 25-60 years old: 1-3 doses may be recommended based on certain risk factors  Adults 72 years old: Prevnar (PCV13) vaccine recommended followed by Pneumovax (PPSV23) vaccine  If already received PPSV23 since turning 65, then PCV13 recommended at least one year after PPSV23 dose  Hepatitis B Vaccine 3 dose series if at intermediate or high risk (ex: diabetes, end stage renal disease, liver disease)   Tetanus (Td) Vaccine - COST NOT COVERED BY MEDICARE PART B Following completion of primary series, a booster dose should be given every 10 years to maintain immunity against tetanus  Td may also be given as tetanus wound prophylaxis  Tdap Vaccine - COST NOT COVERED BY MEDICARE PART B Recommended at least once for all adults  For pregnant patients, recommended with each pregnancy  Shingles Vaccine (Shingrix) - COST NOT COVERED BY MEDICARE PART B  2 shot series recommended in those aged 48 and above     Health Maintenance Due:      Topic Date Due    Colorectal Cancer Screening  01/11/2024    Hepatitis C Screening  Completed     Immunizations Due:      Topic Date Due    DTaP,Tdap,and Td Vaccines (1 - Tdap) 02/21/1966     Advance Directives   What are advance directives? Advance directives are legal documents that state your wishes and plans for medical care  These plans are made ahead of time in case you lose your ability to make decisions for yourself  Advance directives can apply to any medical decision, such as the treatments you want, and if you want to donate organs  What are the types of advance directives?   There are many types of advance directives, and each state has rules about how to use them  You may choose a combination of any of the following:  · Living will: This is a written record of the treatment you want  You can also choose which treatments you do not want, which to limit, and which to stop at a certain time  This includes surgery, medicine, IV fluid, and tube feedings  · Durable power of  for healthcare [facility]): This is a written record that states who you want to make healthcare choices for you when you are unable to make them for yourself  This person, called a proxy, is usually a family member or a friend  You may choose more than 1 proxy  · Do not resuscitate (DNR) order:  A DNR order is used in case your heart stops beating or you stop breathing  It is a request not to have certain forms of treatment, such as CPR  A DNR order may be included in other types of advance directives  · Medical directive: This covers the care that you want if you are in a coma, near death, or unable to make decisions for yourself  You can list the treatments you want for each condition  Treatment may include pain medicine, surgery, blood transfusions, dialysis, IV or tube feedings, and a ventilator (breathing machine)  · Values history: This document has questions about your views, beliefs, and how you feel and think about life  This information can help others choose the care that you would choose  Why are advance directives important? An advance directive helps you control your care  Although spoken wishes may be used, it is better to have your wishes written down  Spoken wishes can be misunderstood, or not followed  Treatments may be given even if you do not want them  An advance directive may make it easier for your family to make difficult choices about your care     Weight Management   Why it is important to manage your weight:  Being overweight increases your risk of health conditions such as heart disease, high blood pressure, type 2 diabetes, and certain types of cancer  It can also increase your risk for osteoarthritis, sleep apnea, and other respiratory problems  Aim for a slow, steady weight loss  Even a small amount of weight loss can lower your risk of health problems  How to lose weight safely:  A safe and healthy way to lose weight is to eat fewer calories and get regular exercise  You can lose up about 1 pound a week by decreasing the number of calories you eat by 500 calories each day  Healthy meal plan for weight management:  A healthy meal plan includes a variety of foods, contains fewer calories, and helps you stay healthy  A healthy meal plan includes the following:  · Eat whole-grain foods more often  A healthy meal plan should contain fiber  Fiber is the part of grains, fruits, and vegetables that is not broken down by your body  Whole-grain foods are healthy and provide extra fiber in your diet  Some examples of whole-grain foods are whole-wheat breads and pastas, oatmeal, brown rice, and bulgur  · Eat a variety of vegetables every day  Include dark, leafy greens such as spinach, kale, jeff greens, and mustard greens  Eat yellow and orange vegetables such as carrots, sweet potatoes, and winter squash  · Eat a variety of fruits every day  Choose fresh or canned fruit (canned in its own juice or light syrup) instead of juice  Fruit juice has very little or no fiber  · Eat low-fat dairy foods  Drink fat-free (skim) milk or 1% milk  Eat fat-free yogurt and low-fat cottage cheese  Try low-fat cheeses such as mozzarella and other reduced-fat cheeses  · Choose meat and other protein foods that are low in fat  Choose beans or other legumes such as split peas or lentils  Choose fish, skinless poultry (chicken or turkey), or lean cuts of red meat (beef or pork)  Before you cook meat or poultry, cut off any visible fat  · Use less fat and oil  Try baking foods instead of frying them   Add less fat, such as margarine, sour cream, regular salad dressing and mayonnaise to foods  Eat fewer high-fat foods  Some examples of high-fat foods include french fries, doughnuts, ice cream, and cakes  · Eat fewer sweets  Limit foods and drinks that are high in sugar  This includes candy, cookies, regular soda, and sweetened drinks  Exercise:  Exercise at least 30 minutes per day on most days of the week  Some examples of exercise include walking, biking, dancing, and swimming  You can also fit in more physical activity by taking the stairs instead of the elevator or parking farther away from stores  Ask your healthcare provider about the best exercise plan for you  © Copyright [employer] 2018 Information is for End User's use only and may not be sold, redistributed or otherwise used for commercial purposes   All illustrations and images included in CareNotes® are the copyrighted property of A [employer] M [employer]  or [address] [place] [address]

## 2021-07-27 ENCOUNTER — APPOINTMENT (OUTPATIENT)
Dept: LAB | Facility: CLINIC | Age: 76
End: 2021-07-27
Payer: COMMERCIAL

## 2021-07-27 DIAGNOSIS — E78.2 MIXED HYPERLIPIDEMIA: ICD-10-CM

## 2021-07-27 DIAGNOSIS — E11.41 TYPE 2 DIABETES MELLITUS WITH DIABETIC MONONEUROPATHY, WITHOUT LONG-TERM CURRENT USE OF INSULIN (HCC): ICD-10-CM

## 2021-07-27 DIAGNOSIS — Z12.5 SCREENING FOR PROSTATE CANCER: ICD-10-CM

## 2021-07-27 LAB
ALBUMIN SERPL BCP-MCNC: 3.2 G/DL (ref 3.5–5)
ALP SERPL-CCNC: 68 U/L (ref 46–116)
ALT SERPL W P-5'-P-CCNC: 16 U/L (ref 12–78)
ANION GAP SERPL CALCULATED.3IONS-SCNC: 7 MMOL/L (ref 4–13)
AST SERPL W P-5'-P-CCNC: 6 U/L (ref 5–45)
BASOPHILS # BLD AUTO: 0.03 THOUSANDS/ΜL (ref 0–0.1)
BASOPHILS NFR BLD AUTO: 0 % (ref 0–1)
BILIRUB SERPL-MCNC: 0.91 MG/DL (ref 0.2–1)
BUN SERPL-MCNC: 16 MG/DL (ref 5–25)
CALCIUM ALBUM COR SERPL-MCNC: 9.4 MG/DL (ref 8.3–10.1)
CALCIUM SERPL-MCNC: 8.8 MG/DL (ref 8.3–10.1)
CHLORIDE SERPL-SCNC: 106 MMOL/L (ref 100–108)
CHOLEST SERPL-MCNC: 157 MG/DL (ref 50–200)
CO2 SERPL-SCNC: 26 MMOL/L (ref 21–32)
CREAT SERPL-MCNC: 1 MG/DL (ref 0.6–1.3)
EOSINOPHIL # BLD AUTO: 0 THOUSAND/ΜL (ref 0–0.61)
EOSINOPHIL NFR BLD AUTO: 0 % (ref 0–6)
ERYTHROCYTE [DISTWIDTH] IN BLOOD BY AUTOMATED COUNT: 13.6 % (ref 11.6–15.1)
GFR SERPL CREATININE-BSD FRML MDRD: 74 ML/MIN/1.73SQ M
GLUCOSE P FAST SERPL-MCNC: 124 MG/DL (ref 65–99)
HCT VFR BLD AUTO: 42.1 % (ref 36.5–49.3)
HDLC SERPL-MCNC: 36 MG/DL
HGB BLD-MCNC: 14.3 G/DL (ref 12–17)
IMM GRANULOCYTES # BLD AUTO: 0.03 THOUSAND/UL (ref 0–0.2)
IMM GRANULOCYTES NFR BLD AUTO: 0 % (ref 0–2)
LDLC SERPL CALC-MCNC: 96 MG/DL (ref 0–100)
LYMPHOCYTES # BLD AUTO: 2.08 THOUSANDS/ΜL (ref 0.6–4.47)
LYMPHOCYTES NFR BLD AUTO: 26 % (ref 14–44)
MCH RBC QN AUTO: 31.6 PG (ref 26.8–34.3)
MCHC RBC AUTO-ENTMCNC: 34 G/DL (ref 31.4–37.4)
MCV RBC AUTO: 93 FL (ref 82–98)
MONOCYTES # BLD AUTO: 0.54 THOUSAND/ΜL (ref 0.17–1.22)
MONOCYTES NFR BLD AUTO: 7 % (ref 4–12)
NEUTROPHILS # BLD AUTO: 5.49 THOUSANDS/ΜL (ref 1.85–7.62)
NEUTS SEG NFR BLD AUTO: 67 % (ref 43–75)
NRBC BLD AUTO-RTO: 0 /100 WBCS
PLATELET # BLD AUTO: 147 THOUSANDS/UL (ref 149–390)
PMV BLD AUTO: 10.1 FL (ref 8.9–12.7)
POTASSIUM SERPL-SCNC: 4.5 MMOL/L (ref 3.5–5.3)
PROT SERPL-MCNC: 6.3 G/DL (ref 6.4–8.2)
PSA SERPL-MCNC: 0.3 NG/ML (ref 0–4)
RBC # BLD AUTO: 4.53 MILLION/UL (ref 3.88–5.62)
SODIUM SERPL-SCNC: 139 MMOL/L (ref 136–145)
TRIGL SERPL-MCNC: 127 MG/DL
TSH SERPL DL<=0.05 MIU/L-ACNC: 0.37 UIU/ML (ref 0.36–3.74)
WBC # BLD AUTO: 8.17 THOUSAND/UL (ref 4.31–10.16)

## 2021-07-27 PROCEDURE — 36415 COLL VENOUS BLD VENIPUNCTURE: CPT

## 2021-07-27 PROCEDURE — 85025 COMPLETE CBC W/AUTO DIFF WBC: CPT

## 2021-07-27 PROCEDURE — 84443 ASSAY THYROID STIM HORMONE: CPT

## 2021-07-27 PROCEDURE — 80053 COMPREHEN METABOLIC PANEL: CPT

## 2021-07-27 PROCEDURE — G0103 PSA SCREENING: HCPCS

## 2021-07-27 PROCEDURE — 80061 LIPID PANEL: CPT

## 2021-09-13 ENCOUNTER — TELEPHONE (OUTPATIENT)
Dept: INTERNAL MEDICINE CLINIC | Facility: CLINIC | Age: 76
End: 2021-09-13
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
## 2021-09-13 DIAGNOSIS — H91.93 BILATERAL HEARING LOSS, UNSPECIFIED HEARING LOSS TYPE: Primary | ICD-10-CM
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
## 2021-09-21 ENCOUNTER — RA CDI HCC (OUTPATIENT)
Dept: OTHER | Facility: HOSPITAL | Age: 76
End: 2021-09-21

## 2021-09-21 NOTE — PROGRESS NOTES
[facility]             Chart Reviewed * (Number of) Inbasket suggestions sent to Provider: 1     Problem listed updated  Provider Accepted, (number of) suggestions accepted: 1               Patients insurance company: [address]  (Medicare [employer]vantage and [employer])     Visit status: Patient canceled the appointment        [facility]  coding opportunities             Chart Reviewed * (Number of) Inbasket suggestions sent to Provider: 1     Problem listed updated  Provider Accepted, (number of) suggestions accepted: 1               Patients insurance company: [address]  (Medicare [employer]vantage and [employer])           [facility]                Problem listed updated   Provider Accepted, (number of) suggestions accepted: 1    D69 6 Thrombocytopenia ([facility] )  *Last labs showed decreased platelets     If this is correct, please document and assess at your next visit 9/27/21                 Patients insurance company: [address]  (Medicare Advantage and [employer])

## 2021-09-22 PROBLEM — D69.6 THROMBOCYTOPENIA, UNSPECIFIED (HCC): Status: ACTIVE | Noted: 2021-09-22

## 2021-09-28 ENCOUNTER — TELEPHONE (OUTPATIENT)
Dept: INTERNAL MEDICINE CLINIC | Facility: CLINIC | Age: 76
End: 2021-09-28

## 2021-09-29 ENCOUNTER — TELEPHONE (OUTPATIENT)
Dept: INTERNAL MEDICINE CLINIC | Facility: CLINIC | Age: 76
End: 2021-09-29

## 2022-11-11 ENCOUNTER — VBI (OUTPATIENT)
Dept: ADMINISTRATIVE | Facility: OTHER | Age: 77
End: 2022-11-11